# Patient Record
Sex: FEMALE | Race: BLACK OR AFRICAN AMERICAN | Employment: OTHER | URBAN - METROPOLITAN AREA
[De-identification: names, ages, dates, MRNs, and addresses within clinical notes are randomized per-mention and may not be internally consistent; named-entity substitution may affect disease eponyms.]

---

## 2017-04-24 ENCOUNTER — OP HISTORICAL/CONVERTED ENCOUNTER (OUTPATIENT)
Dept: OTHER | Age: 72
End: 2017-04-24

## 2017-05-02 ENCOUNTER — OP HISTORICAL/CONVERTED ENCOUNTER (OUTPATIENT)
Dept: OTHER | Age: 72
End: 2017-05-02

## 2017-08-03 ENCOUNTER — OP HISTORICAL/CONVERTED ENCOUNTER (OUTPATIENT)
Dept: OTHER | Age: 72
End: 2017-08-03

## 2017-11-20 ENCOUNTER — OP HISTORICAL/CONVERTED ENCOUNTER (OUTPATIENT)
Dept: OTHER | Age: 72
End: 2017-11-20

## 2017-12-22 ENCOUNTER — OP HISTORICAL/CONVERTED ENCOUNTER (OUTPATIENT)
Dept: OTHER | Age: 72
End: 2017-12-22

## 2018-02-05 ENCOUNTER — ED HISTORICAL/CONVERTED ENCOUNTER (OUTPATIENT)
Dept: OTHER | Age: 73
End: 2018-02-05

## 2018-02-28 ENCOUNTER — ED HISTORICAL/CONVERTED ENCOUNTER (OUTPATIENT)
Dept: OTHER | Age: 73
End: 2018-02-28

## 2018-03-16 ENCOUNTER — OP HISTORICAL/CONVERTED ENCOUNTER (OUTPATIENT)
Dept: OTHER | Age: 73
End: 2018-03-16

## 2018-06-27 ENCOUNTER — OP HISTORICAL/CONVERTED ENCOUNTER (OUTPATIENT)
Dept: OTHER | Age: 73
End: 2018-06-27

## 2018-08-02 ENCOUNTER — OP HISTORICAL/CONVERTED ENCOUNTER (OUTPATIENT)
Dept: OTHER | Age: 73
End: 2018-08-02

## 2018-09-27 ENCOUNTER — OP HISTORICAL/CONVERTED ENCOUNTER (OUTPATIENT)
Dept: OTHER | Age: 73
End: 2018-09-27

## 2019-03-08 ENCOUNTER — IP HISTORICAL/CONVERTED ENCOUNTER (OUTPATIENT)
Dept: OTHER | Age: 74
End: 2019-03-08

## 2019-07-08 ENCOUNTER — OP HISTORICAL/CONVERTED ENCOUNTER (OUTPATIENT)
Dept: OTHER | Age: 74
End: 2019-07-08

## 2019-09-11 ENCOUNTER — OP HISTORICAL/CONVERTED ENCOUNTER (OUTPATIENT)
Dept: OTHER | Age: 74
End: 2019-09-11

## 2019-09-12 ENCOUNTER — OP HISTORICAL/CONVERTED ENCOUNTER (OUTPATIENT)
Dept: OTHER | Age: 74
End: 2019-09-12

## 2019-12-05 ENCOUNTER — OP HISTORICAL/CONVERTED ENCOUNTER (OUTPATIENT)
Dept: OTHER | Age: 74
End: 2019-12-05

## 2019-12-09 ENCOUNTER — ED HISTORICAL/CONVERTED ENCOUNTER (OUTPATIENT)
Dept: OTHER | Age: 74
End: 2019-12-09

## 2020-04-28 ENCOUNTER — OP HISTORICAL/CONVERTED ENCOUNTER (OUTPATIENT)
Dept: OTHER | Age: 75
End: 2020-04-28

## 2020-05-01 ENCOUNTER — OP HISTORICAL/CONVERTED ENCOUNTER (OUTPATIENT)
Dept: OTHER | Age: 75
End: 2020-05-01

## 2020-06-01 ENCOUNTER — OP HISTORICAL/CONVERTED ENCOUNTER (OUTPATIENT)
Dept: OTHER | Age: 75
End: 2020-06-01

## 2020-07-01 ENCOUNTER — OP HISTORICAL/CONVERTED ENCOUNTER (OUTPATIENT)
Dept: OTHER | Age: 75
End: 2020-07-01

## 2020-07-29 ENCOUNTER — ED HISTORICAL/CONVERTED ENCOUNTER (OUTPATIENT)
Dept: OTHER | Age: 75
End: 2020-07-29

## 2020-08-01 ENCOUNTER — OP HISTORICAL/CONVERTED ENCOUNTER (OUTPATIENT)
Dept: OTHER | Age: 75
End: 2020-08-01

## 2020-09-01 ENCOUNTER — OP HISTORICAL/CONVERTED ENCOUNTER (OUTPATIENT)
Dept: OTHER | Age: 75
End: 2020-09-01

## 2020-09-03 ENCOUNTER — ED HISTORICAL/CONVERTED ENCOUNTER (OUTPATIENT)
Dept: OTHER | Age: 75
End: 2020-09-03

## 2020-09-22 ENCOUNTER — APPOINTMENT (OUTPATIENT)
Dept: GENERAL RADIOLOGY | Age: 75
End: 2020-09-22
Attending: EMERGENCY MEDICINE
Payer: MEDICARE

## 2020-09-22 ENCOUNTER — HOSPITAL ENCOUNTER (EMERGENCY)
Age: 75
Discharge: HOME | End: 2020-09-22
Attending: EMERGENCY MEDICINE
Payer: MEDICARE

## 2020-09-22 VITALS
DIASTOLIC BLOOD PRESSURE: 74 MMHG | TEMPERATURE: 98 F | RESPIRATION RATE: 18 BRPM | OXYGEN SATURATION: 100 % | HEART RATE: 58 BPM | HEIGHT: 62 IN | WEIGHT: 180 LBS | BODY MASS INDEX: 33.13 KG/M2 | SYSTOLIC BLOOD PRESSURE: 190 MMHG

## 2020-09-22 DIAGNOSIS — M54.50 ACUTE LEFT-SIDED LOW BACK PAIN WITHOUT SCIATICA: Primary | ICD-10-CM

## 2020-09-22 LAB
APPEARANCE UR: CLEAR
BACTERIA URNS QL MICRO: NEGATIVE /HPF
BILIRUB UR QL: NEGATIVE
COLOR UR: NORMAL
GLUCOSE UR STRIP.AUTO-MCNC: NEGATIVE MG/DL
HGB UR QL STRIP: NEGATIVE
KETONES UR QL STRIP.AUTO: NEGATIVE MG/DL
LEUKOCYTE ESTERASE UR QL STRIP.AUTO: NEGATIVE
NITRITE UR QL STRIP.AUTO: NEGATIVE
PH UR STRIP: 7 [PH] (ref 5–8)
PROT UR STRIP-MCNC: NEGATIVE MG/DL
RBC #/AREA URNS HPF: NORMAL /HPF (ref 0–5)
SP GR UR REFRACTOMETRY: 1.01 (ref 1–1.03)
UROBILINOGEN UR QL STRIP.AUTO: 0.1 EU/DL (ref 0.1–1)
WBC URNS QL MICRO: NORMAL /HPF (ref 0–4)

## 2020-09-22 PROCEDURE — 72100 X-RAY EXAM L-S SPINE 2/3 VWS: CPT

## 2020-09-22 PROCEDURE — 81003 URINALYSIS AUTO W/O SCOPE: CPT

## 2020-09-22 PROCEDURE — 74011250637 HC RX REV CODE- 250/637: Performed by: EMERGENCY MEDICINE

## 2020-09-22 PROCEDURE — 99283 EMERGENCY DEPT VISIT LOW MDM: CPT

## 2020-09-22 RX ORDER — ACETAMINOPHEN 325 MG/1
650 TABLET ORAL ONCE
Status: COMPLETED | OUTPATIENT
Start: 2020-09-22 | End: 2020-09-22

## 2020-09-22 RX ADMIN — ACETAMINOPHEN 650 MG: 325 TABLET, FILM COATED ORAL at 17:46

## 2020-09-22 NOTE — ED PROVIDER NOTES
EMERGENCY DEPARTMENT HISTORY AND PHYSICAL EXAM        Date: 9/22/2020  Patient Name: Gabi Daly    History of Presenting Illness     Chief Complaint   Patient presents with    Back Pain       History Provided By: Patient    HPI: Gabi Daly, 75 y.o. female with a past medical history significant diabetes, hypertension, asthma and CHF presents to the ED with cc of back pain. Pt states the pain is located in the middle, right back, rates a 10/10 in intensity, and does not radiate. Pt denies any recent falls or injuries. Pt denies accompanying dysuria, weakness in lower extremities, numbness or tingling, and difficulty urinating. PCP: Sowmya Self MD    Current Outpatient Medications   Medication Sig Dispense Refill    VENTOLIN HFA 90 mcg/actuation inhaler   1    nitroglycerin (NITROSTAT) 0.4 mg SL tablet by SubLINGual route every five (5) minutes as needed for Chest Pain.  metolazone (ZAROXOLYN) 5 mg tablet Take 5 mg by mouth every Monday, Wednesday, Friday.  omeprazole (PRILOSEC) 20 mg capsule Take 20 mg by mouth daily.  furosemide (LASIX) 40 mg tablet Take  by mouth daily.  losartan (COZAAR) 50 mg tablet Take  by mouth daily.  carvedilol (COREG) 6.25 mg tablet Take  by mouth two (2) times daily (with meals).  clopidogrel (PLAVIX) 75 mg tablet Take  by mouth daily.  potassium chloride 20 mEq TbER Take  by mouth.  allopurinol (ZYLOPRIM) 100 mg tablet Take  by mouth daily.  amLODIPine (NORVASC) 5 mg tablet Take 5 mg by mouth daily.  aspirin delayed-release 81 mg tablet Take  by mouth daily.  sitaGLIPtin (JANUVIA) 100 mg tablet Take 100 mg by mouth daily.  levothyroxine (SYNTHROID) 50 mcg tablet Take 1 Tab by mouth Daily (before breakfast).  WITH WATER ONLY 30 Tab 6       Past History     Past Medical History:  Past Medical History:   Diagnosis Date    Asthma     [facility]     DM [facility]diabetes mellitus) [facility] [facility]     HTN (hypertension)        Past Surgical History:  Past Surgical History:   Procedure Laterality Date    HX HYSTERECTOMY      HX MOHS PROCEDURES         Family History:  Family History   Problem Relation Age of Onset    Hypertension Mother     Diabetes Mother     Heart Disease Mother        Social History:  Social History     Tobacco Use    Smoking status: Never Smoker    Smokeless tobacco: Never Used   Substance Use Topics    Alcohol use: No    Drug use: Not on file       Allergies:  No Known Allergies    Review of Systems   Review of Systems   Constitutional: Negative for fever. HENT: Negative for congestion. Eyes: Negative for visual disturbance. Respiratory: Negative for cough. Cardiovascular: Negative for chest pain. Gastrointestinal: Negative for vomiting. Genitourinary: Negative for difficulty urinating, dysuria and hematuria. Musculoskeletal: Positive for back pain. Negative for myalgias. Skin: Negative for rash. Neurological: Negative for weakness, numbness and headaches. Physical Exam   Physical Exam  Constitutional:       Appearance: Normal appearance. HENT:      Head: Normocephalic and atraumatic. Pulmonary:      Effort: Pulmonary effort is normal.   Abdominal:      General: There is no distension. Musculoskeletal:      Right lower leg: No edema. Left lower leg: No edema. Comments: No midline spinal tenderness, no perispinal tenderness   Skin:     Findings: No rash. Neurological:      Mental Status: She is alert.       Comments: 5/5 strength in lower extremities, normal sensitivity to light touch in lower extremities   Psychiatric:         Mood and Affect: Mood normal.         Diagnostic Study Results     Labs -     Recent Results (from the past 12 hour(s))   URINALYSIS W/ RFLX MICROSCOPIC    Collection Time: 09/22/20  1:00 PM   Result Value Ref Range    Color Yellow/Straw      Appearance Clear Clear      Specific gravity 1.015 1.003 - 1.030      pH (UA) 7.0 5.0 - 8.0      Protein Negative Negative mg/dL    Glucose Negative Negative mg/dL    Ketone Negative Negative mg/dL    Bilirubin Negative Negative      Blood Negative Negative      Urobilinogen 0.1 0.1 - 1.0 EU/dL    Nitrites Negative Negative      Leukocyte Esterase Negative Negative      WBC 0-5 0 - 4 /hpf    RBC 0-5 0 - 5 /hpf    Bacteria Negative Negative /hpf       Radiologic Studies -   XR SPINE LUMB 2 OR 3 V   Final Result   IMPRESSION:      Spondylosis. CT Results  (Last 48 hours)    None        CXR Results  (Last 48 hours)    None          Medical Decision Making and ED Course     I reviewed the vital signs, available nursing notes, past medical history, past surgical history, family history and social history. Vital Signs - Reviewed the patient's vital signs. Patient Vitals for the past 12 hrs:   Temp Pulse Resp BP SpO2   09/22/20 1252 98 °F (36.7 °C) (!) 58 18 (!) 190/74 100 %     The patient presents with back pain and the differential diagnosis is musculoskeletal back pain, sciatica, arthritis, spinal stenosis    Medical Decision Making:   Presented with low back pain. Ambulates well and has no neurological deficits. No red flag signs for back pain that would warrant MRI. Feel patient can be discharged home. She will take Tylenol at home and does take daily aspirin which may help with her pain as well. She will follow-up with her primary care doctor if her symptoms do not improve for physical therapy or possible MRI. I told her she should follow-up these in 6 weeks if symptoms do not improve. Disposition     Discharged    DISCHARGE PLAN:  1.    Current Discharge Medication List      CONTINUE these medications which have NOT CHANGED    Details   VENTOLIN HFA 90 mcg/actuation inhaler Refills: 1      nitroglycerin (NITROSTAT) 0.4 mg SL tablet by SubLINGual route every five (5) minutes as needed for Chest Pain.      metolazone (ZAROXOLYN) 5 mg tablet Take 5 mg by mouth every Monday, Wednesday, Friday. omeprazole (PRILOSEC) 20 mg capsule Take 20 mg by mouth daily. furosemide (LASIX) 40 mg tablet Take  by mouth daily. losartan (COZAAR) 50 mg tablet Take  by mouth daily. carvedilol (COREG) 6.25 mg tablet Take  by mouth two (2) times daily (with meals). clopidogrel (PLAVIX) 75 mg tablet Take  by mouth daily. potassium chloride 20 mEq TbER Take  by mouth. allopurinol (ZYLOPRIM) 100 mg tablet Take  by mouth daily. amLODIPine (NORVASC) 5 mg tablet Take 5 mg by mouth daily. aspirin delayed-release 81 mg tablet Take  by mouth daily. sitaGLIPtin (JANUVIA) 100 mg tablet Take 100 mg by mouth daily. levothyroxine (SYNTHROID) 50 mcg tablet Take 1 Tab by mouth Daily (before breakfast). WITH WATER ONLY  Qty: 30 Tab, Refills: 6           2. Follow-up Information     Follow up With Specialties Details Why Pooja Otero MD Internal Medicine Schedule an appointment as soon as possible for a visit in 3 days As needed [address] [facility] [address] [phone]      [facility] EMERGENCY DEPT Emergency Medicine Go today As soon as possible if symptoms worsen [address] [facility] [address] [phone]        3. Return to ED if worse     Diagnosis     Clinical impression:   1. Acute left-sided low back pain without sciatica       Attestation:    Cezar YUSUF am scribing for, and in the presence of Dr. Marlena Dumont. I, Dr Marlena Dumont, have reviewed any and all documentation by a scribe and authenticated its accuracy. I have reviewed and agreed with any and all documentation and medical decision making by a nurse practitioner/physician assistant. I have reviewed and agreed with any and all documentation and medical decision making by a nurse practitioner/physician assistant. note that this dictation was completed with [employer], the [employer] voice recognition software.  Quite often unanticipated grammatical, syntax, homophones, and other interpretive errors are inadvertently transcribed by the computer software. Please disregard these errors. Please excuse any errors that have escaped final proofreading. Thank you.   Aylin Don, DO

## 2020-10-01 ENCOUNTER — OP HISTORICAL/CONVERTED ENCOUNTER (OUTPATIENT)
Dept: OTHER | Age: 75
End: 2020-10-01

## 2021-10-26 ENCOUNTER — HOSPITAL ENCOUNTER (OUTPATIENT)
Dept: PREADMISSION TESTING | Age: 76
Discharge: HOME | End: 2021-10-26
Payer: MEDICARE

## 2021-10-26 VITALS
DIASTOLIC BLOOD PRESSURE: 79 MMHG | SYSTOLIC BLOOD PRESSURE: 149 MMHG | TEMPERATURE: 97.6 F | OXYGEN SATURATION: 100 % | BODY MASS INDEX: 30.21 KG/M2 | RESPIRATION RATE: 16 BRPM | WEIGHT: 153.88 LBS | HEIGHT: 60 IN | HEART RATE: 70 BPM

## 2021-10-26 LAB
ALBUMIN SERPL-MCNC: 3.3 G/DL (ref 3.5–5)
ALBUMIN/GLOB SERPL: 0.8 {RATIO} (ref 1.1–2.2)
ALP SERPL-CCNC: 55 U/L (ref 45–117)
ALT SERPL-CCNC: 16 U/L (ref 12–78)
ANION GAP SERPL CALC-SCNC: 4 MMOL/L (ref 5–15)
APPEARANCE UR: CLEAR
APTT PPP: 33.8 SEC (ref 21.2–34.1)
AST SERPL W P-5'-P-CCNC: 11 U/L (ref 15–37)
BACTERIA URNS QL MICRO: NEGATIVE /HPF
BILIRUB SERPL-MCNC: 0.2 MG/DL (ref 0.2–1)
BILIRUB UR QL: NEGATIVE
BUN SERPL-MCNC: 20 MG/DL (ref 6–20)
BUN/CREAT SERPL: 18 (ref 12–20)
CA-I BLD-MCNC: 9.7 MG/DL (ref 8.5–10.1)
CHLORIDE SERPL-SCNC: 105 MMOL/L (ref 97–108)
CO2 SERPL-SCNC: 32 MMOL/L (ref 21–32)
COLOR UR: NORMAL
CREAT SERPL-MCNC: 1.14 MG/DL (ref 0.55–1.02)
ERYTHROCYTE [DISTWIDTH] IN BLOOD BY AUTOMATED COUNT: 12.8 % (ref 11.5–14.5)
GLOBULIN SER CALC-MCNC: 4 G/DL (ref 2–4)
GLUCOSE SERPL-MCNC: 108 MG/DL (ref 65–100)
GLUCOSE UR STRIP.AUTO-MCNC: NEGATIVE MG/DL
HCT VFR BLD AUTO: 40.3 % (ref 35–47)
HGB BLD-MCNC: 12.9 G/DL (ref 11.5–16)
HGB UR QL STRIP: NEGATIVE
INR PPP: 1 (ref 0.9–1.1)
KETONES UR QL STRIP.AUTO: NEGATIVE MG/DL
LEUKOCYTE ESTERASE UR QL STRIP.AUTO: NEGATIVE
MCH RBC QN AUTO: 31.1 PG (ref 26–34)
MCHC RBC AUTO-ENTMCNC: 32 G/DL (ref 30–36.5)
MCV RBC AUTO: 97.1 FL (ref 80–99)
MUCOUS THREADS URNS QL MICRO: NORMAL /LPF
NITRITE UR QL STRIP.AUTO: NEGATIVE
NRBC # BLD: 0 K/UL (ref 0–0.01)
NRBC BLD-RTO: 0 PER 100 WBC
PH UR STRIP: 7 [PH] (ref 5–8)
PLATELET # BLD AUTO: 222 K/UL (ref 150–400)
PMV BLD AUTO: 9.6 FL (ref 8.9–12.9)
POTASSIUM SERPL-SCNC: 4.1 MMOL/L (ref 3.5–5.1)
PROT SERPL-MCNC: 7.3 G/DL (ref 6.4–8.2)
PROT UR STRIP-MCNC: NEGATIVE MG/DL
PROTHROMBIN TIME: 12.8 SEC (ref 11.9–14.7)
RBC # BLD AUTO: 4.15 M/UL (ref 3.8–5.2)
RBC #/AREA URNS HPF: NORMAL /HPF (ref 0–5)
SODIUM SERPL-SCNC: 141 MMOL/L (ref 136–145)
SP GR UR REFRACTOMETRY: 1.01 (ref 1–1.03)
THERAPEUTIC RANGE,PTTT: NORMAL SEC (ref 82–109)
UROBILINOGEN UR QL STRIP.AUTO: 0.1 EU/DL (ref 0.1–1)
WBC # BLD AUTO: 4.9 K/UL (ref 3.6–11)
WBC URNS QL MICRO: NORMAL /HPF (ref 0–4)

## 2021-10-26 PROCEDURE — 81001 URINALYSIS AUTO W/SCOPE: CPT

## 2021-10-26 PROCEDURE — 36415 COLL VENOUS BLD VENIPUNCTURE: CPT

## 2021-10-26 PROCEDURE — 85730 THROMBOPLASTIN TIME PARTIAL: CPT

## 2021-10-26 PROCEDURE — 80053 COMPREHEN METABOLIC PANEL: CPT

## 2021-10-26 PROCEDURE — 85027 COMPLETE CBC AUTOMATED: CPT

## 2021-10-26 PROCEDURE — 85610 PROTHROMBIN TIME: CPT

## 2021-10-26 RX ORDER — TRAMADOL HYDROCHLORIDE AND ACETAMINOPHEN 37.5; 325 MG/1; MG/1
1 TABLET ORAL
COMMUNITY

## 2021-10-26 RX ORDER — HYDRALAZINE HYDROCHLORIDE 25 MG/1
25 TABLET, FILM COATED ORAL 3 TIMES DAILY
COMMUNITY

## 2021-10-26 RX ORDER — MECLIZINE HYDROCHLORIDE 25 MG/1
25 TABLET ORAL
COMMUNITY
End: 2022-08-16

## 2021-10-26 RX ORDER — LISINOPRIL 5 MG/1
5 TABLET ORAL DAILY
COMMUNITY
End: 2022-08-16

## 2021-10-26 RX ORDER — DULOXETIN HYDROCHLORIDE 30 MG/1
30 CAPSULE, DELAYED RELEASE ORAL DAILY
COMMUNITY

## 2021-10-27 ENCOUNTER — HOSPITAL ENCOUNTER (OUTPATIENT)
Age: 76
Discharge: HOME | End: 2021-10-27
Attending: INTERNAL MEDICINE | Admitting: INTERNAL MEDICINE
Payer: MEDICARE

## 2021-10-27 VITALS
SYSTOLIC BLOOD PRESSURE: 119 MMHG | RESPIRATION RATE: 16 BRPM | HEART RATE: 73 BPM | DIASTOLIC BLOOD PRESSURE: 57 MMHG | OXYGEN SATURATION: 100 % | TEMPERATURE: 97.8 F

## 2021-10-27 DIAGNOSIS — R07.9 CHEST PAIN, UNSPECIFIED TYPE: ICD-10-CM

## 2021-10-27 LAB
GLUCOSE BLD STRIP.AUTO-MCNC: 79 MG/DL (ref 65–117)
PERFORMED BY, TECHID: NORMAL

## 2021-10-27 PROCEDURE — 77030016700 HC CATH ANGI DX INFN2 CARD -B: Performed by: INTERNAL MEDICINE

## 2021-10-27 PROCEDURE — 93454 CORONARY ARTERY ANGIO S&I: CPT | Performed by: INTERNAL MEDICINE

## 2021-10-27 PROCEDURE — 76210000026 HC REC RM PH II 1 TO 1.5 HR: Performed by: INTERNAL MEDICINE

## 2021-10-27 PROCEDURE — 77030019569 HC BND COMPR RAD TERU -B: Performed by: INTERNAL MEDICINE

## 2021-10-27 PROCEDURE — C1894 INTRO/SHEATH, NON-LASER: HCPCS | Performed by: INTERNAL MEDICINE

## 2021-10-27 PROCEDURE — 77030019698 HC SYR ANGI MDLON MRTM -A: Performed by: INTERNAL MEDICINE

## 2021-10-27 PROCEDURE — 99152 MOD SED SAME PHYS/QHP 5/>YRS: CPT | Performed by: INTERNAL MEDICINE

## 2021-10-27 PROCEDURE — 76210000017 HC OR PH I REC 1.5 TO 2 HR: Performed by: INTERNAL MEDICINE

## 2021-10-27 PROCEDURE — C1769 GUIDE WIRE: HCPCS | Performed by: INTERNAL MEDICINE

## 2021-10-27 PROCEDURE — 74011250636 HC RX REV CODE- 250/636: Performed by: INTERNAL MEDICINE

## 2021-10-27 PROCEDURE — 74011000250 HC RX REV CODE- 250: Performed by: INTERNAL MEDICINE

## 2021-10-27 PROCEDURE — 82962 GLUCOSE BLOOD TEST: CPT

## 2021-10-27 RX ORDER — LIDOCAINE HYDROCHLORIDE 10 MG/ML
INJECTION INFILTRATION; PERINEURAL AS NEEDED
Status: DISCONTINUED | OUTPATIENT
Start: 2021-10-27 | End: 2021-10-27 | Stop reason: HOSPADM

## 2021-10-27 RX ORDER — MIDAZOLAM HYDROCHLORIDE 1 MG/ML
INJECTION INTRAMUSCULAR; INTRAVENOUS AS NEEDED
Status: DISCONTINUED | OUTPATIENT
Start: 2021-10-27 | End: 2021-10-27 | Stop reason: HOSPADM

## 2021-10-27 RX ORDER — HEPARIN SODIUM 1000 [USP'U]/ML
INJECTION, SOLUTION INTRAVENOUS; SUBCUTANEOUS AS NEEDED
Status: DISCONTINUED | OUTPATIENT
Start: 2021-10-27 | End: 2021-10-27 | Stop reason: HOSPADM

## 2021-10-27 RX ORDER — MORPHINE SULFATE 2 MG/ML
INJECTION, SOLUTION INTRAMUSCULAR; INTRAVENOUS AS NEEDED
Status: DISCONTINUED | OUTPATIENT
Start: 2021-10-27 | End: 2021-10-27 | Stop reason: HOSPADM

## 2021-10-27 RX ORDER — SODIUM CHLORIDE 0.9 % (FLUSH) 0.9 %
5-40 SYRINGE (ML) INJECTION EVERY 8 HOURS
Status: DISCONTINUED | OUTPATIENT
Start: 2021-10-27 | End: 2021-10-27 | Stop reason: HOSPADM

## 2021-10-27 RX ORDER — NITROGLYCERIN 5 MG/ML
INJECTION, SOLUTION INTRAVENOUS AS NEEDED
Status: DISCONTINUED | OUTPATIENT
Start: 2021-10-27 | End: 2021-10-27 | Stop reason: HOSPADM

## 2021-10-27 RX ORDER — SODIUM CHLORIDE 0.9 % (FLUSH) 0.9 %
5-40 SYRINGE (ML) INJECTION AS NEEDED
Status: DISCONTINUED | OUTPATIENT
Start: 2021-10-27 | End: 2021-10-27 | Stop reason: HOSPADM

## 2021-10-27 RX ORDER — SODIUM CHLORIDE 9 MG/ML
50 INJECTION, SOLUTION INTRAVENOUS CONTINUOUS
Status: DISCONTINUED | OUTPATIENT
Start: 2021-10-27 | End: 2021-10-27 | Stop reason: HOSPADM

## 2021-10-27 RX ADMIN — SODIUM CHLORIDE 50 ML/HR: 9 INJECTION, SOLUTION INTRAVENOUS at 06:42

## 2022-03-18 PROBLEM — R07.9 CHEST PAIN: Status: ACTIVE | Noted: 2021-10-27

## 2022-04-15 ENCOUNTER — HOSPITAL ENCOUNTER (OUTPATIENT)
Dept: PREADMISSION TESTING | Age: 77
Discharge: HOME | End: 2022-04-15

## 2022-04-19 ENCOUNTER — HOSPITAL ENCOUNTER (OUTPATIENT)
Dept: NON INVASIVE DIAGNOSTICS | Age: 77
Discharge: HOME | End: 2022-04-19
Attending: INTERNAL MEDICINE

## 2022-08-12 ENCOUNTER — APPOINTMENT (OUTPATIENT)
Dept: CT IMAGING | Age: 77
End: 2022-08-12
Attending: EMERGENCY MEDICINE
Payer: MEDICARE

## 2022-08-12 ENCOUNTER — APPOINTMENT (OUTPATIENT)
Dept: GENERAL RADIOLOGY | Age: 77
End: 2022-08-12
Attending: EMERGENCY MEDICINE
Payer: MEDICARE

## 2022-08-12 ENCOUNTER — HOSPITAL ENCOUNTER (OUTPATIENT)
Age: 77
Setting detail: OBSERVATION
Discharge: HOME HEALTH | End: 2022-08-16
Attending: EMERGENCY MEDICINE | Admitting: HOSPITALIST
Payer: MEDICARE

## 2022-08-12 DIAGNOSIS — I50.9 CONGESTIVE HEART FAILURE, UNSPECIFIED HF CHRONICITY, UNSPECIFIED HEART FAILURE TYPE (HCC): ICD-10-CM

## 2022-08-12 DIAGNOSIS — R06.02 SOB (SHORTNESS OF BREATH): Primary | ICD-10-CM

## 2022-08-12 DIAGNOSIS — I16.0 HYPERTENSIVE URGENCY: ICD-10-CM

## 2022-08-12 DIAGNOSIS — I21.4 NSTEMI (NON-ST ELEVATED MYOCARDIAL INFARCTION) (HCC): ICD-10-CM

## 2022-08-12 DIAGNOSIS — R09.02 HYPOXIA: ICD-10-CM

## 2022-08-12 LAB
BASOPHILS # BLD: 0 K/UL (ref 0–0.1)
BASOPHILS NFR BLD: 0 % (ref 0–1)
BNP SERPL-MCNC: ABNORMAL PG/ML
DIFFERENTIAL METHOD BLD: ABNORMAL
EOSINOPHIL # BLD: 0.1 K/UL (ref 0–0.4)
EOSINOPHIL NFR BLD: 1 % (ref 0–7)
ERYTHROCYTE [DISTWIDTH] IN BLOOD BY AUTOMATED COUNT: 14.4 % (ref 11.5–14.5)
HCT VFR BLD AUTO: 39.6 % (ref 35–47)
HGB BLD-MCNC: 12.4 G/DL (ref 11.5–16)
IMM GRANULOCYTES # BLD AUTO: 0 K/UL (ref 0–0.04)
IMM GRANULOCYTES NFR BLD AUTO: 0 % (ref 0–0.5)
LYMPHOCYTES # BLD: 0.9 K/UL (ref 0.8–3.5)
LYMPHOCYTES NFR BLD: 12 % (ref 12–49)
MCH RBC QN AUTO: 30.2 PG (ref 26–34)
MCHC RBC AUTO-ENTMCNC: 31.3 G/DL (ref 30–36.5)
MCV RBC AUTO: 96.4 FL (ref 80–99)
MONOCYTES # BLD: 0.6 K/UL (ref 0–1)
MONOCYTES NFR BLD: 8 % (ref 5–13)
NEUTS SEG # BLD: 5.8 K/UL (ref 1.8–8)
NEUTS SEG NFR BLD: 79 % (ref 32–75)
PLATELET # BLD AUTO: 179 K/UL (ref 150–400)
PMV BLD AUTO: 10.9 FL (ref 8.9–12.9)
RBC # BLD AUTO: 4.11 M/UL (ref 3.8–5.2)
TROPONIN-HIGH SENSITIVITY: 136 NG/L (ref 0–51)
TROPONIN-HIGH SENSITIVITY: 87 NG/L (ref 0–51)
WBC # BLD AUTO: 7.4 K/UL (ref 3.6–11)

## 2022-08-12 PROCEDURE — 96374 THER/PROPH/DIAG INJ IV PUSH: CPT

## 2022-08-12 PROCEDURE — 36415 COLL VENOUS BLD VENIPUNCTURE: CPT

## 2022-08-12 PROCEDURE — 84484 ASSAY OF TROPONIN QUANT: CPT

## 2022-08-12 PROCEDURE — G0378 HOSPITAL OBSERVATION PER HR: HCPCS

## 2022-08-12 PROCEDURE — 74011250637 HC RX REV CODE- 250/637: Performed by: EMERGENCY MEDICINE

## 2022-08-12 PROCEDURE — 99285 EMERGENCY DEPT VISIT HI MDM: CPT

## 2022-08-12 PROCEDURE — 80048 BASIC METABOLIC PNL TOTAL CA: CPT

## 2022-08-12 PROCEDURE — 74011250636 HC RX REV CODE- 250/636: Performed by: EMERGENCY MEDICINE

## 2022-08-12 PROCEDURE — 80076 HEPATIC FUNCTION PANEL: CPT

## 2022-08-12 PROCEDURE — 70450 CT HEAD/BRAIN W/O DYE: CPT

## 2022-08-12 PROCEDURE — 71045 X-RAY EXAM CHEST 1 VIEW: CPT

## 2022-08-12 PROCEDURE — 83880 ASSAY OF NATRIURETIC PEPTIDE: CPT

## 2022-08-12 PROCEDURE — 85025 COMPLETE CBC W/AUTO DIFF WBC: CPT

## 2022-08-12 PROCEDURE — 93005 ELECTROCARDIOGRAM TRACING: CPT

## 2022-08-12 PROCEDURE — 84443 ASSAY THYROID STIM HORMONE: CPT

## 2022-08-12 RX ORDER — FUROSEMIDE 10 MG/ML
60 INJECTION INTRAMUSCULAR; INTRAVENOUS ONCE
Status: COMPLETED | OUTPATIENT
Start: 2022-08-12 | End: 2022-08-12

## 2022-08-12 RX ORDER — CLONIDINE HYDROCHLORIDE 0.1 MG/1
0.2 TABLET ORAL
Status: COMPLETED | OUTPATIENT
Start: 2022-08-12 | End: 2022-08-12

## 2022-08-12 RX ORDER — ASPIRIN 325 MG
325 TABLET ORAL ONCE
Status: COMPLETED | OUTPATIENT
Start: 2022-08-12 | End: 2022-08-12

## 2022-08-12 RX ADMIN — CLONIDINE HYDROCHLORIDE 0.2 MG: 0.1 TABLET ORAL at 21:57

## 2022-08-12 RX ADMIN — ASPIRIN 325 MG ORAL TABLET 325 MG: 325 PILL ORAL at 21:51

## 2022-08-12 RX ADMIN — FUROSEMIDE 60 MG: 10 INJECTION, SOLUTION INTRAMUSCULAR; INTRAVENOUS at 21:41

## 2022-08-12 RX ADMIN — NITROGLYCERIN 1 INCH: 20 OINTMENT TOPICAL at 23:54

## 2022-08-12 NOTE — Clinical Note
Status[de-identified] INPATIENT [101]   Type of Bed: Telemetry [19]   Cardiac Monitoring Required?: Yes   Inpatient Hospitalization Certified Necessary for the Following Reasons: 3.  Patient receiving treatment that can only be provided in an inpatient setting (further clarification in H&P documentation)   Admitting Diagnosis: CHF (congestive heart failure) [facility]) [116874]   Admitting Diagnosis: Hypoxia [185051]   Admitting Physician: Lionel MONGE [facility] [[id number]]   Attending Physician: Yuliya Bellamy [[id number]]   Estimated Length of Stay: 2 Midnights   Discharge Plan[de-identified] Home with Office Follow-up

## 2022-08-13 LAB
ALBUMIN SERPL-MCNC: 3.4 G/DL (ref 3.5–5)
ALBUMIN/GLOB SERPL: 1 {RATIO} (ref 1.1–2.2)
ALP SERPL-CCNC: 49 U/L (ref 45–117)
ALT SERPL-CCNC: 53 U/L (ref 12–78)
ANION GAP SERPL CALC-SCNC: 9 MMOL/L (ref 5–15)
AST SERPL W P-5'-P-CCNC: 46 U/L (ref 15–37)
ATRIAL RATE: 127 BPM
BILIRUB DIRECT SERPL-MCNC: 0.5 MG/DL (ref 0–0.2)
BILIRUB SERPL-MCNC: 1.4 MG/DL (ref 0.2–1)
BUN SERPL-MCNC: 18 MG/DL (ref 6–20)
BUN/CREAT SERPL: 16 (ref 12–20)
CA-I BLD-MCNC: 9.2 MG/DL (ref 8.5–10.1)
CALCULATED P AXIS, ECG09: 78 DEGREES
CALCULATED R AXIS, ECG10: 4 DEGREES
CALCULATED T AXIS, ECG11: 7 DEGREES
CHLORIDE SERPL-SCNC: 107 MMOL/L (ref 97–108)
CO2 SERPL-SCNC: 24 MMOL/L (ref 21–32)
CREAT SERPL-MCNC: 1.15 MG/DL (ref 0.55–1.02)
DIAGNOSIS, 93000: NORMAL
GLOBULIN SER CALC-MCNC: 3.5 G/DL (ref 2–4)
GLUCOSE SERPL-MCNC: 150 MG/DL (ref 65–100)
P-R INTERVAL, ECG05: 216 MS
POTASSIUM SERPL-SCNC: 3.7 MMOL/L (ref 3.5–5.1)
PROT SERPL-MCNC: 6.9 G/DL (ref 6.4–8.2)
Q-T INTERVAL, ECG07: 326 MS
QRS DURATION, ECG06: 80 MS
QTC CALCULATION (BEZET), ECG08: 470 MS
SODIUM SERPL-SCNC: 140 MMOL/L (ref 136–145)
TROPONIN-HIGH SENSITIVITY: 180 NG/L (ref 0–51)
TSH SERPL DL<=0.05 MIU/L-ACNC: 9.58 UIU/ML (ref 0.36–3.74)
VENTRICULAR RATE, ECG03: 125 BPM

## 2022-08-13 PROCEDURE — 94761 N-INVAS EAR/PLS OXIMETRY MLT: CPT

## 2022-08-13 PROCEDURE — G0378 HOSPITAL OBSERVATION PER HR: HCPCS

## 2022-08-13 PROCEDURE — 36415 COLL VENOUS BLD VENIPUNCTURE: CPT

## 2022-08-13 PROCEDURE — 77010033678 HC OXYGEN DAILY

## 2022-08-13 PROCEDURE — 74011250637 HC RX REV CODE- 250/637: Performed by: INTERNAL MEDICINE

## 2022-08-13 PROCEDURE — 65270000029 HC RM PRIVATE

## 2022-08-13 PROCEDURE — 84484 ASSAY OF TROPONIN QUANT: CPT

## 2022-08-13 PROCEDURE — 94762 N-INVAS EAR/PLS OXIMTRY CONT: CPT

## 2022-08-13 RX ORDER — CARVEDILOL 12.5 MG/1
12.5 TABLET ORAL 2 TIMES DAILY WITH MEALS
Status: DISCONTINUED | OUTPATIENT
Start: 2022-08-13 | End: 2022-08-16 | Stop reason: HOSPADM

## 2022-08-13 RX ORDER — SPIRONOLACTONE 25 MG/1
12.5 TABLET ORAL DAILY
Status: DISCONTINUED | OUTPATIENT
Start: 2022-08-14 | End: 2022-08-16 | Stop reason: HOSPADM

## 2022-08-13 RX ORDER — ATORVASTATIN CALCIUM 40 MG/1
40 TABLET, FILM COATED ORAL
Status: DISCONTINUED | OUTPATIENT
Start: 2022-08-13 | End: 2022-08-16 | Stop reason: HOSPADM

## 2022-08-13 RX ORDER — GUAIFENESIN 100 MG/5ML
81 LIQUID (ML) ORAL DAILY
Status: DISCONTINUED | OUTPATIENT
Start: 2022-08-14 | End: 2022-08-16 | Stop reason: HOSPADM

## 2022-08-13 RX ORDER — LOSARTAN POTASSIUM 50 MG/1
50 TABLET ORAL DAILY
Status: DISCONTINUED | OUTPATIENT
Start: 2022-08-14 | End: 2022-08-16 | Stop reason: HOSPADM

## 2022-08-13 RX ADMIN — ATORVASTATIN CALCIUM 40 MG: 40 TABLET, FILM COATED ORAL at 21:10

## 2022-08-13 RX ADMIN — CARVEDILOL 12.5 MG: 12.5 TABLET, FILM COATED ORAL at 17:25

## 2022-08-13 NOTE — ED NOTES
While rounding with second RN for offgoing shift report, patient noted to be off monitors. Patient's purewick, IV, and nasal cannula self removed. 2 RNs assisted with reorienting patient, changing linens, and reapplying monitors. Patient noted to have urinated in drawer, confused, unaware that she was in the hospital or why. This RN discussed circumstances with patient and plan of care.     Shift report given to CURTIS Ayala RN. Patient resting in a position of comfort with door open, room close to nurses station.

## 2022-08-13 NOTE — CONSULTS
Consult    NAME: Larisa Walter   :  1945   MRN:  [id number]     Date/Time:  2022 3:55 PM    Patient PCP: Edgard Villanueva MD  ________________________________________________________________________      Subjective:   CHIEF COMPLAINT: Shortness of breath and chest pain. HISTORY OF PRESENT ILLNESS:     Covering for Dr. Ceballos Doctor  Patient stated that she experienced some shortness of breath and chest pain and pain that lasted about 4 minutes and was in left parasternal border area. She also experience some palpitation and she presented to emergency room. Her BNP is over 10,000. Episode happened while she was walking to the mailbox. She denied any dizziness or lightheadedness or loss of consciousness. According to daughter patient is more short of breath and has some leg edema for couple weeks. She does give history of heart failure. Past Medical History:   Diagnosis Date    Asthma     Chest discomfort     pt states off & on for few months with or without activity    CHF (congestive heart failure) (HCC)     DM (diabetes mellitus) ([address]     GERD (gastroesophageal reflux disease)     HTN (hypertension)     Thyroid disease       Past Surgical History:   Procedure Laterality Date    HX HYSTERECTOMY      HX MOHS PROCEDURES       No Known Allergies   Meds:  See below  Social History     Tobacco Use    Smoking status: Never    Smokeless tobacco: Never   Substance Use Topics    Alcohol use: No   Negative for smoking drinking or drugs. Family History   Problem Relation Age of Onset    Hypertension Mother     Diabetes Mother     Heart Disease Mother         FAMILY HISTORY: Mother  in her [de-identified] and she had a heart condition and diabetes and also had a pacemaker. She does not know anything about her father. PERSONAL HISTORY : Patient is  and has 7 children and she used to work at [employer]. She is retired.     REVIEW OF SYSTEMS:     []         Unable to obtain ROS due to ---   [x]         Total of 12 systems reviewed as follows:    Constitutional: negative fever, negative chills, negative weight loss  Eyes:   negative visual changes  ENT:   negative sore throat, tongue or lip swelling  Respiratory:  negative cough, significant for dyspnea  Cards:              significant for chest pain, palpitations, lower extremity edema  GI:   negative for nausea, vomiting, diarrhea, and abdominal pain  Genitourinary: negative for frequency, dysuria  Integument:  negative for rash   Hematologic:  negative for easy bruising and gum/nose bleeding  Musculoskel: negative for myalgias,  back pain  Neurological:  negative for headaches, dizziness, vertigo, weakness  Behavl/Psych: negative for feelings of anxiety, depression     Pertinent Positives include :    Objective:      Physical Exam:    Last 24hrs VS reviewed since prior progress note. Most recent are:    Visit Vitals  BP (!) 151/85 (BP 1 Location: Right upper arm, BP Patient Position: At rest)   Pulse 76   Temp 97.7 °F (36.5 °C)   Resp 20   Ht 5' 2\" (1.575 m)   Wt 68 kg (150 lb)   SpO2 92%   Breastfeeding No   BMI 27.44 kg/m²       Intake/Output Summary (Last 24 hours) at 8/13/2022 1555  Last data filed at 8/13/2022 0810  Gross per 24 hour   Intake --   Output 3300 ml   Net -3300 ml        General Appearance: Well developed, well nourished, alert & oriented x 3,    no acute distress. Ears/Nose/Mouth/Throat: Pupils equal and round, Hearing grossly normal.  Neck: Supple. JVP within normal limits. Carotids good upstrokes, with no bruit. Chest: Lungs clear to auscultation bilaterally. Cardiovascular: Regular rate and rhythm, S1S2 normal, no murmur, rubs, gallops. Abdomen: Soft, non-tender, bowel sounds are active. No organomegaly. Extremities: No edema bilaterally. Femoral pulses +2, Distal Pulses +1. Skin: Warm and dry. Neuro: CN II-XII grossly intact, Strength and sensation grossly intact.     []         Post-cath site without hematoma, bruit, tenderness, or thrill. Distal pulses intact. Data:      Telemetry: Sinus rhythm and she had episode of atrial tachycardia/short run of atrial fibrillation    EKG:  []  No new EKG for review. Prior to Admission medications    Medication Sig Start Date End Date Taking? Authorizing Provider   traMADol-acetaminophen (ULTRACET) 37.5-325 mg per tablet Take 1 Tablet by mouth every six (6) hours as needed for Pain. Provider, Historical   hydrALAZINE (APRESOLINE) 25 mg tablet Take 25 mg by mouth three (3) times daily. Provider, Historical   lisinopriL (PRINIVIL, ZESTRIL) 5 mg tablet Take 5 mg by mouth daily. Provider, Historical   MAGNESIUM OXIDE, BULK, 200 mg by Does Not Apply route daily. Provider, Historical   meclizine (ANTIVERT) 25 mg tablet Take 25 mg by mouth two (2) times daily as needed for Dizziness. Patient not taking: Reported on 10/27/2021    Provider, Historical   DULoxetine (CYMBALTA) 30 mg capsule Take 30 mg by mouth daily. Provider, Historical   VENTOLIN HFA 90 mcg/actuation inhaler Take 2 Puffs by inhalation as needed. 2/24/16   Provider, Historical   nitroglycerin (NITROSTAT) 0.4 mg SL tablet by SubLINGual route every five (5) minutes as needed for Chest Pain. Provider, Historical   metolazone (ZAROXOLYN) 5 mg tablet Take 5 mg by mouth every Monday, Wednesday, Friday. Patient not taking: Reported on 10/26/2021    Provider, Historical   omeprazole (PRILOSEC) 20 mg capsule Take 20 mg by mouth daily. Patient not taking: Reported on 10/26/2021    Provider, Historical   furosemide (LASIX) 40 mg tablet Take 40 mg by mouth as needed. Every 6-8 hours as needed per Dr. Williams Tubbs office note    Provider, Historical   losartan (COZAAR) 50 mg tablet Take 50 mg by mouth daily. Provider, Historical   carvedilol (COREG) 6.25 mg tablet Take 12.5 mg by mouth two (2) times daily (with meals).     Provider, Historical   clopidogrel (PLAVIX) 75 mg tablet Take 75 mg by mouth daily.    Provider, Historical   potassium chloride 20 mEq TbER Take  by mouth. Patient not taking: Reported on 10/26/2021    Provider, Historical   allopurinol (ZYLOPRIM) 100 mg tablet Take  by mouth daily. Patient not taking: Reported on 10/26/2021    Provider, Historical   amLODIPine (NORVASC) 5 mg tablet Take 5 mg by mouth daily. Provider, Historical   aspirin delayed-release 81 mg tablet Take 81 mg by mouth daily. Provider, Historical   sitaGLIPtin (JANUVIA) 100 mg tablet Take 50 mg by mouth daily. Provider, Historical   levothyroxine (SYNTHROID) 50 mcg tablet Take 1 Tab by mouth Daily (before breakfast). WITH WATER ONLY  Patient taking differently: Take 25 mcg by mouth Daily (before breakfast). WITH WATER ONLY 4/21/16   Anayeli Boyd MD       Recent Results (from the past 24 hour(s))   EKG, 12 LEAD, INITIAL    Collection Time: 08/12/22  8:55 PM   Result Value Ref Range    Ventricular Rate 125 BPM    Atrial Rate 127 BPM    P-R Interval 216 ms    QRS Duration 80 ms    Q-T Interval 326 ms    QTC Calculation (Bezet) 470 ms    Calculated P Axis 78 degrees    Calculated R Axis 4 degrees    Calculated T Axis 7 degrees    Diagnosis       Sinus Tachycardia/short burst of SVT with 1st degree A-V block with Premature   atrial complexes  Septal infarct  Abnormal ECG    Confirmed by Lo Garcia MD, [facility] ([id number]) on 8/13/2022 9:13:23 AM     CBC WITH AUTOMATED DIFF    Collection Time: 08/12/22  9:00 PM   Result Value Ref Range    WBC 7.4 3.6 - 11.0 K/uL    RBC 4.11 3.80 - 5.20 M/uL    HGB 12.4 11.5 - 16.0 g/dL    HCT 39.6 35.0 - 47.0 %    MCV 96.4 80.0 - 99.0 FL    MCH 30.2 26.0 - 34.0 PG    MCHC 31.3 30.0 - 36.5 g/dL    RDW 14.4 11.5 - 14.5 %    PLATELET 030 017 - 899 K/uL    MPV 10.9 8.9 - 12.9 FL    NEUTROPHILS 79 (H) 32 - 75 %    LYMPHOCYTES 12 12 - 49 %    MONOCYTES 8 5 - 13 %    EOSINOPHILS 1 0 - 7 %    BASOPHILS 0 0 - 1 %    IMMATURE GRANULOCYTES 0 0.0 - 0.5 %    ABS. NEUTROPHILS 5.8 1.8 - 8.0 K/UL    ABS. LYMPHOCYTES 0.9 0.8 - 3.5 K/UL    ABS. MONOCYTES 0.6 0.0 - 1.0 K/UL    ABS. EOSINOPHILS 0.1 0.0 - 0.4 K/UL    ABS. BASOPHILS 0.0 0.0 - 0.1 K/UL    ABS. IMM. GRANS. 0.0 0.00 - 0.04 K/UL    DF AUTOMATED     TROPONIN-HIGH SENSITIVITY    Collection Time: 08/12/22  9:00 PM   Result Value Ref Range    Troponin-High Sensitivity 87 (H) 0 - 51 ng/L   METABOLIC PANEL, BASIC    Collection Time: 08/12/22  9:00 PM   Result Value Ref Range    Sodium 140 136 - 145 mmol/L    Potassium 3.7 3.5 - 5.1 mmol/L    Chloride 107 97 - 108 mmol/L    CO2 24 21 - 32 mmol/L    Anion gap 9 5 - 15 mmol/L    Glucose 150 (H) 65 - 100 mg/dL    BUN 18 6 - 20 mg/dL    Creatinine 1.15 (H) 0.55 - 1.02 mg/dL    BUN/Creatinine ratio 16 12 - 20      GFR est AA 55 (L) >60 ml/min/1.73m2    GFR est non-AA 46 (L) >60 ml/min/1.73m2    Calcium 9.2 8.5 - 10.1 mg/dL   HEPATIC FUNCTION PANEL    Collection Time: 08/12/22  9:00 PM   Result Value Ref Range    Protein, total 6.9 6.4 - 8.2 g/dL    Albumin 3.4 (L) 3.5 - 5.0 g/dL    Globulin 3.5 2.0 - 4.0 g/dL    A-G Ratio 1.0 (L) 1.1 - 2.2      Bilirubin, total 1.4 (H) 0.2 - 1.0 mg/dL    Bilirubin, direct 0.5 (H) 0.0 - 0.2 mg/dL    Alk. phosphatase 49 45 - 117 U/L    AST (SGOT) 46 (H) 15 - 37 U/L    ALT (SGPT) 53 12 - 78 U/L   TSH 3RD GENERATION    Collection Time: 08/12/22  9:00 PM   Result Value Ref Range    TSH 9.58 (H) 0.36 - 3.74 uIU/mL   NT-PRO BNP    Collection Time: 08/12/22 10:45 PM   Result Value Ref Range    NT pro-BNP 10,221 (H) <450 pg/mL   TROPONIN-HIGH SENSITIVITY    Collection Time: 08/12/22 10:59 PM   Result Value Ref Range    Troponin-High Sensitivity 136 (HH) 0 - 51 ng/L   TROPONIN-HIGH SENSITIVITY    Collection Time: 08/13/22  5:30 AM   Result Value Ref Range    Troponin-High Sensitivity 180 (HH) 0 - 51 ng/L         Assessment:   Her presentation is concerning for heart failure. May have paroxysmal atrial fibrillation and atrial tachycardia. Hypertension. Diabetes mellitus type 2.   TSH level is high.  Her troponin I is 180 and I seriously doubt that this is a presentation of myocardial infarction at this value is probably indeterminate. Plan:   I will adjust the blood pressure medications and monitor for cardiac arrhythmias. Probably will check echocardiogram and consider diuretics. If she has episodes of paroxysmal atrial fibrillation may consider anticoagulation. Other management as per medical doctor. Thank you.         []        High complexity decision making was performed    Onesimo Reardon MD

## 2022-08-13 NOTE — ED PROVIDER NOTES
EMERGENCY DEPARTMENT HISTORY AND PHYSICAL EXAM      Date: 8/12/2022  Patient Name: Marisela Noguera    History of Presenting Illness       History Provided By: Patient    HPI: Marisela Noguera, 67 y.o. female presents to the ED with cc of shortness of breath. Patient complains of sudden onset of shortness of breath and lightheadedness while she was walking to a mailbox just prior to arrival.  Patient has a history of congestive heart failure and has been compliant with her cardiac medications. No chest pain. No fever chills. No URI symptoms. No headache, visual changes, slurred speech, paresthesia, or motor dysfunction. No vertiginous dizziness. Past History     Past Medical History:  Past Medical History:   Diagnosis Date    Asthma     Chest discomfort     pt states off & on for few months with or without activity    CHF (congestive heart failure) ([facility])     DM (diabetes mellitus) ([facility] [address]     GERD (gastroesophageal reflux disease)     HTN (hypertension)     Thyroid disease        Past Surgical History:  Past Surgical History:   Procedure Laterality Date    HX HYSTERECTOMY      HX MOHS PROCEDURES         Family History:  Family History   Problem Relation Age of Onset    Hypertension Mother     Diabetes Mother     Heart Disease Mother        Social History:  Social History     Tobacco Use    Smoking status: Never    Smokeless tobacco: Never   Substance Use Topics    Alcohol use: No    Drug use: Never       Allergies:  No Known Allergies      Review of Systems   Review of Systems   Constitutional:  Negative for chills and fever. HENT:  Negative for rhinorrhea and sore throat. Eyes:  Negative for discharge. Respiratory:  Positive for shortness of breath. Cardiovascular:  Negative for chest pain. Gastrointestinal:  Negative for abdominal pain and vomiting. Genitourinary:  Negative for dysuria. Musculoskeletal:  Negative for joint swelling. Skin:  Negative for rash.    Neurological:  Negative for headaches. Psychiatric/Behavioral:  Negative for suicidal ideas. All other systems reviewed and are negative. Physical Exam   Physical Exam  Vitals and nursing note reviewed. Constitutional:       General: She is not in acute distress. Appearance: Normal appearance. She is not ill-appearing, toxic-appearing or diaphoretic. HENT:      Head: Normocephalic and atraumatic. Nose: Nose normal.      Mouth/Throat:      Mouth: Mucous membranes are moist.   Eyes:      Conjunctiva/sclera: Conjunctivae normal.   Cardiovascular:      Rate and Rhythm: Regular rhythm. Tachycardia present. Heart sounds: Normal heart sounds. Pulmonary:      Effort: Pulmonary effort is normal.      Breath sounds: Rales present. Abdominal:      General: Abdomen is flat. Bowel sounds are normal. There is no distension. Palpations: Abdomen is soft. Tenderness: There is no abdominal tenderness. There is no guarding or rebound. Musculoskeletal:      Cervical back: Neck supple. Comments: Bilateral lower leg with a 2+ pitting edema   Skin:     General: Skin is warm and dry. Neurological:      General: No focal deficit present. Mental Status: She is alert and oriented to person, place, and time. Psychiatric:         Behavior: Behavior normal.         Thought Content:  Thought content normal.       Diagnostic Study Results     Labs -     Recent Results (from the past 12 hour(s))   CBC WITH AUTOMATED DIFF    Collection Time: 08/12/22  9:00 PM   Result Value Ref Range    WBC 7.4 3.6 - 11.0 K/uL    RBC 4.11 3.80 - 5.20 M/uL    HGB 12.4 11.5 - 16.0 g/dL    HCT 39.6 35.0 - 47.0 %    MCV 96.4 80.0 - 99.0 FL    MCH 30.2 26.0 - 34.0 PG    MCHC 31.3 30.0 - 36.5 g/dL    RDW 14.4 11.5 - 14.5 %    PLATELET 330 999 - 031 K/uL    MPV 10.9 8.9 - 12.9 FL    NEUTROPHILS 79 (H) 32 - 75 %    LYMPHOCYTES 12 12 - 49 %    MONOCYTES 8 5 - 13 %    EOSINOPHILS 1 0 - 7 %    BASOPHILS 0 0 - 1 %    IMMATURE GRANULOCYTES 0 0.0 - 0.5 %    ABS. NEUTROPHILS 5.8 1.8 - 8.0 K/UL    ABS. LYMPHOCYTES 0.9 0.8 - 3.5 K/UL    ABS. MONOCYTES 0.6 0.0 - 1.0 K/UL    ABS. EOSINOPHILS 0.1 0.0 - 0.4 K/UL    ABS. BASOPHILS 0.0 0.0 - 0.1 K/UL    ABS. IMM. GRANS. 0.0 0.00 - 0.04 K/UL    DF AUTOMATED     TROPONIN-HIGH SENSITIVITY    Collection Time: 08/12/22  9:00 PM   Result Value Ref Range    Troponin-High Sensitivity 87 (H) 0 - 51 ng/L   METABOLIC PANEL, BASIC    Collection Time: 08/12/22  9:00 PM   Result Value Ref Range    Sodium 140 136 - 145 mmol/L    Potassium 3.7 3.5 - 5.1 mmol/L    Chloride 107 97 - 108 mmol/L    CO2 24 21 - 32 mmol/L    Anion gap 9 5 - 15 mmol/L    Glucose 150 (H) 65 - 100 mg/dL    BUN 18 6 - 20 mg/dL    Creatinine 1.15 (H) 0.55 - 1.02 mg/dL    BUN/Creatinine ratio 16 12 - 20      GFR est AA 55 (L) >60 ml/min/1.73m2    GFR est non-AA 46 (L) >60 ml/min/1.73m2    Calcium 9.2 8.5 - 10.1 mg/dL   HEPATIC FUNCTION PANEL    Collection Time: 08/12/22  9:00 PM   Result Value Ref Range    Protein, total 6.9 6.4 - 8.2 g/dL    Albumin 3.4 (L) 3.5 - 5.0 g/dL    Globulin 3.5 2.0 - 4.0 g/dL    A-G Ratio 1.0 (L) 1.1 - 2.2      Bilirubin, total 1.4 (H) 0.2 - 1.0 mg/dL    Bilirubin, direct 0.5 (H) 0.0 - 0.2 mg/dL    Alk.  phosphatase 49 45 - 117 U/L    AST (SGOT) 46 (H) 15 - 37 U/L    ALT (SGPT) 53 12 - 78 U/L   TSH 3RD GENERATION    Collection Time: 08/12/22  9:00 PM   Result Value Ref Range    TSH 9.58 (H) 0.36 - 3.74 uIU/mL   NT-PRO BNP    Collection Time: 08/12/22 10:45 PM   Result Value Ref Range    NT pro-BNP 10,221 (H) <450 pg/mL   TROPONIN-HIGH SENSITIVITY    Collection Time: 08/12/22 10:59 PM   Result Value Ref Range    Troponin-High Sensitivity 136 (HH) 0 - 51 ng/L   TROPONIN-HIGH SENSITIVITY    Collection Time: 08/13/22  5:30 AM   Result Value Ref Range    Troponin-High Sensitivity 180 (HH) 0 - 51 ng/L       Radiologic Studies -   [unfilled]  CT Results  (Last 48 hours)                 08/12/22 2117  CT HEAD WO CONT Final result    Impression: No acute intracranial hemorrhage, mass or infarct. Nonspecific white   matter change most compatible with chronic small vessel ischemic and/or   senescent change. Intracranial atherosclerosis. Narrative:  EXAM: Brain CT without contrast.       INDICATION: dizziness        TECHNIQUE: Noncontrast CT of the brain is performed with 5 mm collimation. Bone   algorithm axial and sagittal and coronal reconstructions performed and   evaluated. CT dose reduction was achieved through use of a standardized   protocol tailored for this examination and automatic exposure control for dose   modulation. COMPARISON: None       FINDINGS: There is no acute intracranial hemorrhage, mass, mass effect or   herniation. Ventricles and sulci show proportionate and symmetric appearance. There is periventricular white matter hypodensity. The gray-white matter   differentiation is well-preserved. Atherosclerotic calcifications are seen   within the carotid siphons. The mastoid air cells are well pneumatized. The   visualized paranasal sinuses are normal.                 CXR Results  (Last 48 hours)                 08/12/22 2109  XR CHEST PORT Final result    Impression:  Findings suspicious for congestive failure with interstitial and   alveolar edema and small pleural effusions though infectious infiltrate is not   excluded. Narrative:  EXAM: XR CHEST PORT       INDICATION: sob       COMPARISON: None. FINDINGS: A portable AP radiograph of the chest was obtained at 21:18 hours. The   patient is on a cardiac monitor. There is interstitial prominence with small   patchy areas of airspace opacity at the perihilar regions and probable pleural   effusions with pulmonary vascular congestion. The cardiac and mediastinal   contours are normal.  The bones and soft tissues are grossly within normal   limits. Medical Decision Making and ED Course   I am the first provider for this patient.     I reviewed the vital signs, available nursing notes, past medical history, past surgical history, family history and social history. Patient with a history of congestive heart failure presents emergency room with acute onset CHF. Clinical exam and a chest x-ray is consistent with pulmonary edema. Clinically patient is not in respiratory distress despite hypoxia of 93% on room air. Patient was placed on 2 L of O2 and given a dose of IV Lasix from which she diuresed well. Clonidine and nitropaste was given to improve her hypertensive urgency. I discussed with the hospitalist for admission. Vital Signs-Reviewed the patient's vital signs. Records Reviewed: Nursing note reviewed      ED Course:   Initial assessment performed. The patients presenting problems have been discussed, and they are in agreement with the care plan formulated and outlined with them. I have encouraged them to ask questions as they arise throughout their visit. Procedures     CRITICAL CARE NOTE :  11:21 PM  Amount of Critical Care Time: __30  __(minutes)__    IMPENDING DETERIORATION -Respiratory and Cardiovascular  ASSOCIATED RISK FACTORS - Hypoxia and Dysrhythmia  MANAGEMENT- Bedside Assessment, Supervision of Care, and Transfer  INTERPRETATION -  Xrays, ECG, and Blood Pressure  INTERVENTIONS - hemodynamic mngmt  CASE REVIEW - Hospitalist/Intensivist  TREATMENT RESPONSE -Improved  PERFORMED BY - Self    NOTES   :  I have spent critical care time involved in lab review, consultations with specialist, family decision- making, bedside attention and documentation. This time excludes time spent in any separate billed procedures. During this entire length of time I was immediately available to the patient . Dodie Dias MD                Disposition     Admitted      DISCHARGE PLAN:  1.    Current Discharge Medication List        CONTINUE these medications which have NOT CHANGED    Details   traMADol-acetaminophen (ULTRACET) 37.5-325 mg per tablet Take 1 Tablet by mouth every six (6) hours as needed for Pain.      hydrALAZINE (APRESOLINE) 25 mg tablet Take 25 mg by mouth three (3) times daily. lisinopriL (PRINIVIL, ZESTRIL) 5 mg tablet Take 5 mg by mouth daily. MAGNESIUM OXIDE, BULK, 200 mg by Does Not Apply route daily. meclizine (ANTIVERT) 25 mg tablet Take 25 mg by mouth two (2) times daily as needed for Dizziness. DULoxetine (CYMBALTA) 30 mg capsule Take 30 mg by mouth daily. VENTOLIN HFA 90 mcg/actuation inhaler Take 2 Puffs by inhalation as needed. Refills: 1      nitroglycerin (NITROSTAT) 0.4 mg SL tablet by SubLINGual route every five (5) minutes as needed for Chest Pain.      metolazone (ZAROXOLYN) 5 mg tablet Take 5 mg by mouth every Monday, Wednesday, Friday. omeprazole (PRILOSEC) 20 mg capsule Take 20 mg by mouth daily. furosemide (LASIX) 40 mg tablet Take 40 mg by mouth as needed. Every 6-8 hours as needed per Dr. Hong Nava office note      losartan (COZAAR) 50 mg tablet Take 50 mg by mouth daily. carvedilol (COREG) 6.25 mg tablet Take 12.5 mg by mouth two (2) times daily (with meals). clopidogrel (PLAVIX) 75 mg tablet Take 75 mg by mouth daily. potassium chloride 20 mEq TbER Take  by mouth. allopurinol (ZYLOPRIM) 100 mg tablet Take  by mouth daily. amLODIPine (NORVASC) 5 mg tablet Take 5 mg by mouth daily. aspirin delayed-release 81 mg tablet Take 81 mg by mouth daily. sitaGLIPtin (JANUVIA) 100 mg tablet Take 50 mg by mouth daily. levothyroxine (SYNTHROID) 50 mcg tablet Take 1 Tab by mouth Daily (before breakfast). WITH WATER ONLY  Qty: 30 Tab, Refills: 6           2. Follow-up Information    None       3. Return to ED if worse     Diagnosis     Clinical Impression:   1. SOB (shortness of breath)    2. Congestive heart failure, unspecified HF chronicity, unspecified heart failure type ([facility] [address]    3. Hypoxia    4. Hypertensive urgency    5.  NSTEMI (non-ST elevated myocardial infarction) ([facility]        Attestations:    Nino Chavira MD    Please note that this dictation was completed with [employer], the computer voice recognition software. Quite often unanticipated grammatical, syntax, homophones, and other interpretive errors are inadvertently transcribed by the computer software. Please disregard these errors. Please excuse any errors that have escaped final proofreading. Thank you.

## 2022-08-13 NOTE — ED NOTES
Assumed care of pt, report received from Ivelisse Rosas RN.  Pt lying on stretcher, self-removed cardiac and spo2 monitor, self removed IV lock, self removed purwick suction device, respirations even and unlabored, skin warm and dry to touch

## 2022-08-13 NOTE — PROGRESS NOTES
PT arrived on unit. Pure wic hooked and skin check done. Pt has some discoloration in sacral area and spots scabbed on fourth right and left toes. She also has little skin shearing of the epidermis pinky toe right side  Pt has nitro patch and is being hooked up to cardiac monitor. PT is alert and oriented and granddaughter at bedside. Report given BY EMS that the pt came from home to free standing clinic with chest pain and being transferred here for higher level of care. Granddaughter states that the pt has a stent from previous cardiac event earlier this year but doesn't know the date. PT lives at home with family member one story.

## 2022-08-13 NOTE — ED TRIAGE NOTES
Walked to the mailbox immediately PTA and began feeling dizzy and SOB. SOB continues, but less dizzy at triage.

## 2022-08-13 NOTE — PROGRESS NOTES
Problem: Pressure Injury - Risk of  Goal: *Prevention of pressure injury  Description: Document Marco Scale and appropriate interventions in the flowsheet. Outcome: Progressing Towards Goal  Note: Pressure Injury Interventions:  Sensory Interventions: Float heels, Turn and reposition approx.  every two hours (pillows and wedges if needed), Use 30-degree side-lying position, Check visual cues for pain, Avoid rigorous massage over bony prominences, Assess changes in LOC    Moisture Interventions: Limit adult briefs, Minimize layers, Absorbent underpads, Internal/External urinary devices    Activity Interventions: Increase time out of bed    Mobility Interventions: HOB 30 degrees or less    Nutrition Interventions: Document food/fluid/supplement intake    Friction and Shear Interventions: HOB 30 degrees or less, Minimize layers

## 2022-08-13 NOTE — ED NOTES
Pt report given to Dorcas Mejia RN. Pt lying on stretcher, sleeping, no acute distress, cardiac, spo2, nbp monitoring, O2 nasal cannula in place at 2L, Pt respirations even and unlabored, skin color normal for ethinicity, warm and dry to touch.

## 2022-08-14 ENCOUNTER — APPOINTMENT (OUTPATIENT)
Dept: NON INVASIVE DIAGNOSTICS | Age: 77
End: 2022-08-14
Attending: INTERNAL MEDICINE
Payer: MEDICARE

## 2022-08-14 PROBLEM — R79.89 ELEVATED TROPONIN: Status: ACTIVE | Noted: 2022-08-14

## 2022-08-14 PROBLEM — R77.8 ELEVATED TROPONIN: Status: ACTIVE | Noted: 2022-08-14

## 2022-08-14 PROBLEM — I48.91 A-FIB (HCC): Status: ACTIVE | Noted: 2022-08-14

## 2022-08-14 LAB
ATRIAL RATE: 96 BPM
BNP SERPL-MCNC: 5565 PG/ML
CALCULATED R AXIS, ECG10: -2 DEGREES
CALCULATED T AXIS, ECG11: -4 DEGREES
DIAGNOSIS, 93000: NORMAL
ECHO AO ROOT DIAM: 3 CM
ECHO AO ROOT INDEX: 1.78 CM/M2
ECHO AR MAX VEL PISA: 4.6 M/S
ECHO AV PEAK GRADIENT: 6 MMHG
ECHO AV PEAK VELOCITY: 1.2 M/S
ECHO AV REGURGITANT PHT: 866 MS
ECHO AV VELOCITY RATIO: 0.5
ECHO EST RA PRESSURE: 15 MMHG
ECHO LA DIAMETER INDEX: 2.78 CM/M2
ECHO LA DIAMETER: 4.7 CM
ECHO LA TO AORTIC ROOT RATIO: 1.57
ECHO LV FRACTIONAL SHORTENING: 4 % (ref 28–44)
ECHO LV INTERNAL DIMENSION DIASTOLE INDEX: 2.78 CM/M2
ECHO LV INTERNAL DIMENSION DIASTOLIC: 4.7 CM (ref 3.9–5.3)
ECHO LV INTERNAL DIMENSION SYSTOLIC INDEX: 2.66 CM/M2
ECHO LV INTERNAL DIMENSION SYSTOLIC: 4.5 CM
ECHO LV IVSD: 1.2 CM (ref 0.6–0.9)
ECHO LV MASS 2D: 224.8 G (ref 67–162)
ECHO LV MASS INDEX 2D: 133 G/M2 (ref 43–95)
ECHO LV POSTERIOR WALL DIASTOLIC: 1.3 CM (ref 0.6–0.9)
ECHO LV RELATIVE WALL THICKNESS RATIO: 0.55
ECHO LVOT PEAK GRADIENT: 2 MMHG
ECHO LVOT PEAK VELOCITY: 0.6 M/S
ECHO MV E DECELERATION TIME (DT): 100 MS
ECHO MV E VELOCITY: 1.4 M/S
ECHO PV MAX VELOCITY: 0.7 M/S
ECHO PV PEAK GRADIENT: 2 MMHG
ECHO RIGHT VENTRICULAR SYSTOLIC PRESSURE (RVSP): 66 MMHG
ECHO RV INTERNAL DIMENSION: 3.4 CM
ECHO TV REGURGITANT MAX VELOCITY: 3.57 M/S
ECHO TV REGURGITANT PEAK GRADIENT: 51 MMHG
EST. AVERAGE GLUCOSE BLD GHB EST-MCNC: 108 MG/DL
HBA1C MFR BLD: 5.4 % (ref 4–5.6)
Q-T INTERVAL, ECG07: 404 MS
QRS DURATION, ECG06: 78 MS
QTC CALCULATION (BEZET), ECG08: 510 MS
TROPONIN-HIGH SENSITIVITY: 73 NG/L (ref 0–51)
VENTRICULAR RATE, ECG03: 96 BPM

## 2022-08-14 PROCEDURE — 93005 ELECTROCARDIOGRAM TRACING: CPT

## 2022-08-14 PROCEDURE — 84484 ASSAY OF TROPONIN QUANT: CPT

## 2022-08-14 PROCEDURE — 83036 HEMOGLOBIN GLYCOSYLATED A1C: CPT

## 2022-08-14 PROCEDURE — 96372 THER/PROPH/DIAG INJ SC/IM: CPT

## 2022-08-14 PROCEDURE — 74011250637 HC RX REV CODE- 250/637: Performed by: INTERNAL MEDICINE

## 2022-08-14 PROCEDURE — 83880 ASSAY OF NATRIURETIC PEPTIDE: CPT

## 2022-08-14 PROCEDURE — 36415 COLL VENOUS BLD VENIPUNCTURE: CPT

## 2022-08-14 PROCEDURE — G0378 HOSPITAL OBSERVATION PER HR: HCPCS

## 2022-08-14 PROCEDURE — 74011250636 HC RX REV CODE- 250/636: Performed by: NURSE PRACTITIONER

## 2022-08-14 PROCEDURE — 93306 TTE W/DOPPLER COMPLETE: CPT

## 2022-08-14 RX ORDER — ONDANSETRON 4 MG/1
4 TABLET, FILM COATED ORAL
Status: DISCONTINUED | OUTPATIENT
Start: 2022-08-14 | End: 2022-08-16 | Stop reason: HOSPADM

## 2022-08-14 RX ORDER — POLYETHYLENE GLYCOL 3350 17 G/17G
17 POWDER, FOR SOLUTION ORAL DAILY PRN
Status: DISCONTINUED | OUTPATIENT
Start: 2022-08-14 | End: 2022-08-16 | Stop reason: HOSPADM

## 2022-08-14 RX ORDER — ENOXAPARIN SODIUM 100 MG/ML
30 INJECTION SUBCUTANEOUS EVERY 24 HOURS
Status: DISCONTINUED | OUTPATIENT
Start: 2022-08-14 | End: 2022-08-14

## 2022-08-14 RX ORDER — ENOXAPARIN SODIUM 100 MG/ML
40 INJECTION SUBCUTANEOUS EVERY 24 HOURS
Status: DISCONTINUED | OUTPATIENT
Start: 2022-08-14 | End: 2022-08-16 | Stop reason: HOSPADM

## 2022-08-14 RX ORDER — ACETAMINOPHEN 325 MG/1
650 TABLET ORAL
Status: DISCONTINUED | OUTPATIENT
Start: 2022-08-14 | End: 2022-08-16 | Stop reason: HOSPADM

## 2022-08-14 RX ADMIN — CARVEDILOL 12.5 MG: 12.5 TABLET, FILM COATED ORAL at 09:00

## 2022-08-14 RX ADMIN — ATORVASTATIN CALCIUM 40 MG: 40 TABLET, FILM COATED ORAL at 21:12

## 2022-08-14 RX ADMIN — CARVEDILOL 12.5 MG: 12.5 TABLET, FILM COATED ORAL at 16:47

## 2022-08-14 RX ADMIN — LOSARTAN POTASSIUM 50 MG: 50 TABLET, FILM COATED ORAL at 09:27

## 2022-08-14 RX ADMIN — ENOXAPARIN SODIUM 40 MG: 100 INJECTION SUBCUTANEOUS at 16:48

## 2022-08-14 RX ADMIN — SPIRONOLACTONE 12.5 MG: 25 TABLET ORAL at 09:27

## 2022-08-14 RX ADMIN — ASPIRIN 81 MG CHEWABLE TABLET 81 MG: 81 TABLET CHEWABLE at 09:27

## 2022-08-14 NOTE — PROGRESS NOTES
Medicare Outpatient Observation Notice (MOON)/ [place] Outpatient Observation Notice (Marta Olivas) provided to patient/representative with verbal explanation of the notice. Time allotted for questions regarding the notice. Patient /representative provided a completed copy of the MOON/VOON notice. Copy placed on bedside chart.

## 2022-08-14 NOTE — PROGRESS NOTES
Problem: Pressure Injury - Risk of  Goal: *Prevention of pressure injury  Description: Document Marco Scale and appropriate interventions in the flowsheet.   Outcome: Progressing Towards Goal  Note: Pressure Injury Interventions:  Sensory Interventions: Float heels    Moisture Interventions: Internal/External urinary devices, Absorbent underpads    Activity Interventions: Increase time out of bed, PT/OT evaluation    Mobility Interventions: HOB 30 degrees or less, Float heels, PT/OT evaluation    Nutrition Interventions: Document food/fluid/supplement intake    Friction and Shear Interventions: HOB 30 degrees or less

## 2022-08-14 NOTE — PROGRESS NOTES
Progress Note      8/14/2022 7:54 AM  NAME: Char Castillo   MRN:  [id number]   Admit Diagnosis: [facility]congest[facility] [I50.9]  Hypoxia [R09.02]      Subjective:   Covering for Dr. Junito Villalobos. Chart reviewed. Discussed with the patient. She feels somewhat better. Still has some shortness of breath. Had any palpitations. Review of Systems:    Symptom Y/N Comments  Symptom Y/N Comments   Fever/Chills n   Chest Pain n    Poor Appetite    Edema     Cough    Abdominal Pain n    Sputum    Joint Pain     SOB/BLAIR y Improving  Pruritis/Rash     Nausea/vomit    Other     Diarrhea         Constipation           Could NOT obtain due to:      Objective:          Physical Exam:    Last 24hrs VS reviewed since prior progress note. Most recent are:    Visit Vitals  /76 (BP 1 Location: Right upper arm, BP Patient Position: At rest)   Pulse 87   Temp 98.2 °F (36.8 °C)   Resp 18   Ht 5' 2\" (1.575 m)   Wt 68 kg (150 lb)   SpO2 96%   Breastfeeding No   BMI 27.44 kg/m²       Intake/Output Summary (Last 24 hours) at 8/14/2022 0754  Last data filed at 8/13/2022 0810  Gross per 24 hour   Intake --   Output 800 ml   Net -800 ml        General Appearance: Well developed, well nourished, alert & oriented x 3,    no acute distress. Ears/Nose/Mouth/Throat: Hearing grossly normal.  Neck: Supple. Chest: Lungs clear to auscultation bilaterally. Cardiovascular: Regular rate and rhythm, S1,S2 normal, no murmur. Abdomen: Soft, non-tender, bowel sounds are active. Extremities: No edema bilaterally. Skin: Warm and dry. []         Post-cath site without hematoma, bruit, tenderness, or thrill. Distal pulses intact.     PMH/SH reviewed - no change compared to H&P    Data Review    Telemetry: normal sinus rhythm     EKG:   []  No new EKG for review    Lab Data Personally Reviewed:    Recent Labs     08/12/22  2100   WBC 7.4   HGB 12.4   HCT 39.6        No results for input(s): INR, PTP, APTT, INREXT in the last 72 hours. Recent Labs     08/12/22  2100      K 3.7      CO2 24   BUN 18   CREA 1.15*   *   CA 9.2     No results for input(s): CPK, CKNDX, TROIQ in the last 72 hours. No lab exists for component: CPKMB  No results found for: CHOL, CHOLX, CHLST, CHOLV, HDL, HDLP, LDL, LDLC, DLDLP, TGLX, TRIGL, TRIGP, CHHD, CHHDX    Recent Labs     08/12/22  2100   AP 49   TP 6.9   ALB 3.4*   GLOB 3.5     No results for input(s): PH, PCO2, PO2 in the last 72 hours. Medications Personally Reviewed:    Current Facility-Administered Medications   Medication Dose Route Frequency    losartan (COZAAR) tablet 50 mg  50 mg Oral DAILY    carvediloL (COREG) tablet 12.5 mg  12.5 mg Oral BID WITH MEALS    spironolactone (ALDACTONE) tablet 12.5 mg  12.5 mg Oral DAILY    atorvastatin (LIPITOR) tablet 40 mg  40 mg Oral QHS    aspirin chewable tablet 81 mg  81 mg Oral DAILY           Problem List:   Decompensated heart failure. Probably patient had a paroxysmal atrial fibrillation versus atrial tachycardia. Hypertension is well controlled. Diabetes mellitus type 2. Minimal elevation of troponin I is not impressive for myocardial infarction. Assessment/Plan:   Will continue telemetry monitoring. Will check echocardiogram when available. Otherwise continue present care. Thank you. []       High complexity decision making was performed in this patient at high risk for decompensation with multiple organ involvement.     Dontae Zamudio MD

## 2022-08-14 NOTE — H&P
Katelyn RONQUILLO, JESENIAP-C    History and Physical      Patient: Jarad Swan MRN: [id number]  SSN: [id number]    YOB: 1945  Age: 67 y.o. Sex: female        Chief Complaint   Patient presents with    Dizziness    Shortness of Breath       Subjective:      Jarad Swan is a 67 y.o. female who presents to the Ed with cc of new onset sob and light headedness while ambulating to mailbox. She reports she was in good health prior to this episode. Endorses no slurred speech, syncope, motor dysfunction, headache. PMH significant for  CHF, DM, HTN, thyroid disease. In the ED head ct negative for acute findings. Intracranial atherosclerosis. Routine lab work shows fidelia, elevated proBNP, and elevated troponin x 3. Hospitalist team consulted for further evaluation. Patient elecats full code. Past Medical History:   Diagnosis Date    Asthma     Chest discomfort     pt states off & on for few months with or without activity    CHF (congestive heart failure) (HCC)     DM (diabetes mellitus) ([facility] [address]     GERD (gastroesophageal reflux disease)     HTN (hypertension)     Thyroid disease      Past Surgical History:   Procedure Laterality Date    HX HYSTERECTOMY      HX MOHS PROCEDURES        Family History   Problem Relation Age of Onset    Hypertension Mother     Diabetes Mother     Heart Disease Mother      Social History     Tobacco Use    Smoking status: Never    Smokeless tobacco: Never   Substance Use Topics    Alcohol use: No      Prior to Admission medications    Medication Sig Start Date End Date Taking? Authorizing Provider   traMADol-acetaminophen (ULTRACET) 37.5-325 mg per tablet Take 1 Tablet by mouth every six (6) hours as needed for Pain. Provider, Historical   hydrALAZINE (APRESOLINE) 25 mg tablet Take 25 mg by mouth three (3) times daily. Provider, Historical   lisinopriL (PRINIVIL, ZESTRIL) 5 mg tablet Take 5 mg by mouth daily.     Provider, Historical   MAGNESIUM OXIDE, BULK, 200 mg by Does Not Apply route daily. Provider, Historical   meclizine (ANTIVERT) 25 mg tablet Take 25 mg by mouth two (2) times daily as needed for Dizziness. Patient not taking: Reported on 10/27/2021    Provider, Historical   DULoxetine (CYMBALTA) 30 mg capsule Take 30 mg by mouth daily. Provider, Historical   VENTOLIN HFA 90 mcg/actuation inhaler Take 2 Puffs by inhalation as needed. 2/24/16   Provider, Historical   nitroglycerin (NITROSTAT) 0.4 mg SL tablet by SubLINGual route every five (5) minutes as needed for Chest Pain. Provider, Historical   metolazone (ZAROXOLYN) 5 mg tablet Take 5 mg by mouth every Monday, Wednesday, Friday. Patient not taking: Reported on 10/26/2021    Provider, Historical   omeprazole (PRILOSEC) 20 mg capsule Take 20 mg by mouth daily. Patient not taking: Reported on 10/26/2021    Provider, Historical   furosemide (LASIX) 40 mg tablet Take 40 mg by mouth as needed. Every 6-8 hours as needed per Dr. Phyllis Chi office note    Provider, Historical   losartan (COZAAR) 50 mg tablet Take 50 mg by mouth daily. Provider, Historical   carvedilol (COREG) 6.25 mg tablet Take 12.5 mg by mouth two (2) times daily (with meals). Provider, Historical   clopidogrel (PLAVIX) 75 mg tablet Take 75 mg by mouth daily. Provider, Historical   potassium chloride 20 mEq TbER Take  by mouth. Patient not taking: Reported on 10/26/2021    Provider, Historical   allopurinol (ZYLOPRIM) 100 mg tablet Take  by mouth daily. Patient not taking: Reported on 10/26/2021    Provider, Historical   amLODIPine (NORVASC) 5 mg tablet Take 5 mg by mouth daily. Provider, Historical   aspirin delayed-release 81 mg tablet Take 81 mg by mouth daily. Provider, Historical   sitaGLIPtin (JANUVIA) 100 mg tablet Take 50 mg by mouth daily. Provider, Historical   levothyroxine (SYNTHROID) 50 mcg tablet Take 1 Tab by mouth Daily (before breakfast).  WITH WATER ONLY  Patient taking differently: Take 25 mcg by mouth Daily (before breakfast). WITH WATER ONLY 4/21/16   Lalit Chaparro MD        No Known Allergies    Review of Systems:  Review of Systems   Constitutional:  Negative for chills and fever. HENT:  Negative for ear pain, hearing loss and tinnitus. Respiratory:  Negative for cough and shortness of breath. Cardiovascular:  Negative for chest pain, palpitations and leg swelling. Gastrointestinal:  Negative for abdominal pain, heartburn, nausea and vomiting. Genitourinary:  Negative for dysuria. Musculoskeletal:  Negative for myalgias. Neurological:  Negative for dizziness and headaches. Objective:     Vitals:    08/14/22 0000 08/14/22 0256 08/14/22 0947 08/14/22 1236   BP:  137/76 137/76 (!) 141/77   Pulse: 98 87  70   Resp:  18  20   Temp:  98.2 °F (36.8 °C)  97.7 °F (36.5 °C)   SpO2:  96%  100%   Weight:   68 kg (150 lb)    Height:   5' 2\" (1.575 m)         Physical Exam:  Physical Exam  Vitals and nursing note reviewed. Constitutional:       Appearance: Normal appearance. Eyes:      Extraocular Movements: Extraocular movements intact. Cardiovascular:      Rate and Rhythm: Normal rate. Rhythm irregular. Abdominal:      General: Bowel sounds are normal.   Skin:     General: Skin is warm and dry. Neurological:      Mental Status: She is alert and oriented to person, place, and time.         Assessment:     Hospital Problems  Date Reviewed: 5/30/2016            Codes Class Noted POA    A-[facility]) ICD-10-CM: I48.91  ICD-9-CM: 427.31  8/14/2022 Unknown        Elevated troponin ICD-10-CM: R77.8  ICD-9-CM: 790.6  8/14/2022 Unknown        CHF (congestive heart failure) ([facility] [address] ICD-10-CM: I50.9  ICD-9-CM: 428.0  8/12/2022 Unknown        Hypoxia ICD-10-CM: R09.02  ICD-9-CM: 799.02  8/12/2022 Unknown         Chest pain  Elevated troponin  Trop x 3 elevated  Cardiology following  Obtain echocardiogram  Prn pain management  Continue asa, statin, coreg, losartan, cozaar  EKG shows ST w/ occasional PAC and SVT    CHF exacerbation  BNP declining  Continue above cardioprotective medications    Atrial fibrillation  Managed woth coreg and asa    CKD  Appears at baseline  Renally dose all medication to current gfr          Full Code  GI PROPHYLAXIS none needed  DVT PROPHYLAXIS scd'd  Home medications reviewed and reconciled    Above treatment plan reviewed and discussed with patient in detail at bedside, all questions answered.      Time Spent: > 75 minutes    Signed By: Jonny Swan NP     August 14, 2022

## 2022-08-15 LAB
ATRIAL RATE: 93 BPM
CALCULATED P AXIS, ECG09: 94 DEGREES
CALCULATED R AXIS, ECG10: 3 DEGREES
DIAGNOSIS, 93000: NORMAL
P-R INTERVAL, ECG05: 200 MS
Q-T INTERVAL, ECG07: 386 MS
QRS DURATION, ECG06: 82 MS
QTC CALCULATION (BEZET), ECG08: 479 MS
VENTRICULAR RATE, ECG03: 93 BPM

## 2022-08-15 PROCEDURE — 74011250636 HC RX REV CODE- 250/636: Performed by: NURSE PRACTITIONER

## 2022-08-15 PROCEDURE — 74011250637 HC RX REV CODE- 250/637: Performed by: INTERNAL MEDICINE

## 2022-08-15 PROCEDURE — 93005 ELECTROCARDIOGRAM TRACING: CPT

## 2022-08-15 PROCEDURE — G0378 HOSPITAL OBSERVATION PER HR: HCPCS

## 2022-08-15 PROCEDURE — 96372 THER/PROPH/DIAG INJ SC/IM: CPT

## 2022-08-15 PROCEDURE — 74011250637 HC RX REV CODE- 250/637: Performed by: NURSE PRACTITIONER

## 2022-08-15 RX ORDER — LEVOTHYROXINE SODIUM 25 UG/1
50 TABLET ORAL
Status: DISCONTINUED | OUTPATIENT
Start: 2022-08-15 | End: 2022-08-16 | Stop reason: HOSPADM

## 2022-08-15 RX ADMIN — ENOXAPARIN SODIUM 40 MG: 100 INJECTION SUBCUTANEOUS at 16:22

## 2022-08-15 RX ADMIN — ATORVASTATIN CALCIUM 40 MG: 40 TABLET, FILM COATED ORAL at 21:08

## 2022-08-15 RX ADMIN — LOSARTAN POTASSIUM 50 MG: 50 TABLET, FILM COATED ORAL at 09:37

## 2022-08-15 RX ADMIN — CARVEDILOL 12.5 MG: 12.5 TABLET, FILM COATED ORAL at 16:23

## 2022-08-15 RX ADMIN — SPIRONOLACTONE 12.5 MG: 25 TABLET ORAL at 09:37

## 2022-08-15 RX ADMIN — CARVEDILOL 12.5 MG: 12.5 TABLET, FILM COATED ORAL at 09:43

## 2022-08-15 RX ADMIN — ASPIRIN 81 MG CHEWABLE TABLET 81 MG: 81 TABLET CHEWABLE at 09:37

## 2022-08-15 RX ADMIN — LEVOTHYROXINE SODIUM 50 MCG: 0.03 TABLET ORAL at 16:23

## 2022-08-15 NOTE — PROGRESS NOTES
Problem: Pressure Injury - Risk of  Goal: *Prevention of pressure injury  Description: Document Marco Scale and appropriate interventions in the flowsheet. Outcome: Progressing Towards Goal  Note: Pressure Injury Interventions:  Sensory Interventions: Keep linens dry and wrinkle-free    Moisture Interventions: Internal/External urinary devices    Activity Interventions: Increase time out of bed, PT/OT evaluation    Mobility Interventions: HOB 30 degrees or less, PT/OT evaluation    Nutrition Interventions: Document food/fluid/supplement intake    Friction and Shear Interventions: HOB 30 degrees or less                Problem: Falls - Risk of  Goal: *Absence of Falls  Description: Document Kodi Fall Risk and appropriate interventions in the flowsheet.   Outcome: Progressing Towards Goal  Note: Fall Risk Interventions:            Medication Interventions: Bed/chair exit alarm, Patient to call before getting OOB    Elimination Interventions: Bed/chair exit alarm, Call light in reach

## 2022-08-15 NOTE — PROGRESS NOTES
Hospitalist Progress Note            Daily Progress Note: 8/15/2022 2:10 PM  Hospital course:     Lex Mendoza is a 79-year-old female with a PMH significant for asthma, CHF, DM, GERD, HTN and thyroid disease. She comes to the emergency room after an episode of shortness of breath with lightheadedness which happened after walking to her mailbox. Significant labs on admission reveal elevated BNP, elevated troponin, elevated TSH and transaminitis. On examination she was hypertensive, hypoxic with bilateral lower extremity pitting edema and respiratory Rales. Chest x-ray positive for vascular congestion probable pleural effusions. Placed on 2 L nasal cannula, given IV Lasix in the ED to improve hypertension and shortness of breath. She was admitted for further evaluation of non-STEMI, CHF exacerbation, hypoxia and A. fib. Cardiology was consulted. Severe systolic function reduction of EF 20 to 25%, severe global hypokinesis, severe MVR and moderately elevated RVSP of 66 mmHg, small pericardial effusion and left pleural effusion. Subjective:     Examined patient at bedside. She continues to be profoundly weak. Assessment/Plan:   Principal Problem:    CHF (congestive heart failure) ([address] (8/12/2022)    Active Problems:    Hypoxia (8/12/2022)      A-fib ([address] (8/14/2022)      Elevated troponin (8/14/2022)      Chest pain  Non-STEMI  CHF exacerbation  Shortness of breath  -Cardiology was consulted.     -2D echo reveals severe systolic function reduction of EF 20 to 25%, severe global hypokinesis, severe MVR and moderately elevated RVSP of 66 mmHg, small pericardial effusion and left pleural effusion.  -Elevated BNP  -Elevated troponins  -Received IV Lasix now on spironolactone  -Continue medications aspirin, statin, beta-blocker and ARB, spironolactone    Atrial fibrillation  Continue medications on Coreg    CKD  Appears at baseline    Generalized weakness  PT OT and case management for disposition needs        DVT Prophylaxis: SCDs  Code Status: Full Code  POA/NOK:    Disposition and discharge barriers:   PT OT and case management for disposition needs  Care Plan discussed with: Patient, staff, IDR team    Current Facility-Administered Medications   Medication Dose Route Frequency    levothyroxine (SYNTHROID) tablet 50 mcg  50 mcg Oral 6am    acetaminophen (TYLENOL) tablet 650 mg  650 mg Oral Q6H PRN    ondansetron hcl (ZOFRAN) tablet 4 mg  4 mg Oral Q6H PRN    polyethylene glycol (MIRALAX) packet 17 g  17 g Oral DAILY PRN    enoxaparin (LOVENOX) injection 40 mg  40 mg SubCUTAneous Q24H    losartan (COZAAR) tablet 50 mg  50 mg Oral DAILY    carvediloL (COREG) tablet 12.5 mg  12.5 mg Oral BID WITH MEALS    spironolactone (ALDACTONE) tablet 12.5 mg  12.5 mg Oral DAILY    atorvastatin (LIPITOR) tablet 40 mg  40 mg Oral QHS    aspirin chewable tablet 81 mg  81 mg Oral DAILY        REVIEW OF SYSTEMS    Review of Systems   Constitutional:  Positive for malaise/fatigue. Respiratory:  Negative for shortness of breath. Cardiovascular:  Positive for leg swelling. Negative for chest pain. Musculoskeletal:  Positive for myalgias. Neurological:  Positive for weakness. Objective:     Visit Vitals  /69   Pulse 81   Temp 97.6 °F (36.4 °C)   Resp 17   Ht 5' 2\" (1.575 m)   Wt 68 kg (150 lb)   SpO2 95%   Breastfeeding No   BMI 27.44 kg/m²    O2 Flow Rate (L/min): 2 l/min O2 Device: None (Room air)    Temp (24hrs), Av.7 °F (36.5 °C), Min:97.4 °F (36.3 °C), Max:97.9 °F (36.6 °C)      No intake/output data recorded.  1901 - 08/15 0700  In: -   Out: 600 [Urine:600]    PHYSICAL EXAM:    Physical Exam  Constitutional:       Appearance: She is ill-appearing. Cardiovascular:      Rate and Rhythm: Normal rate and regular rhythm. Pulmonary:      Effort: No respiratory distress. Abdominal:      General: There is distension. Tenderness: There is no abdominal tenderness.    Musculoskeletal:      Right lower leg: Edema present. Left lower leg: Edema present. Neurological:      Motor: Weakness present. Coordination: Coordination abnormal.      Gait: Gait abnormal.        Data Review    Recent Results (from the past 24 hour(s))   EKG, 12 LEAD, SUBSEQUENT    Collection Time: 08/15/22  7:56 AM   Result Value Ref Range    Ventricular Rate 93 BPM    Atrial Rate 93 BPM    P-R Interval 200 ms    QRS Duration 82 ms    Q-T Interval 386 ms    QTC Calculation (Bezet) 479 ms    Calculated P Axis 94 degrees    Calculated R Axis 3 degrees    Diagnosis       Sinus rhythm with Premature supraventricular complexes  Minimal voltage criteria for LVH, may be normal variant  ST & T wave abnormality, consider anterior ischemia  Prolonged QT  Abnormal ECG  When compared with ECG of 14-AUG-2022 07:54,  Sinus rhythm has replaced Atrial fibrillation  Confirmed by AP LICEA, Lujean Ganser ([id number]) on 8/15/2022 9:22:06 AM         CT HEAD WO CONT   Final Result   No acute intracranial hemorrhage, mass or infarct. Nonspecific white   matter change most compatible with chronic small vessel ischemic and/or   senescent change. Intracranial atherosclerosis. XR CHEST PORT   Final Result   Findings suspicious for congestive failure with interstitial and   alveolar edema and small pleural effusions though infectious infiltrate is not   excluded.                _____________________________________________________________________________  Time spent in direct care including coordination of service, review of data and examination: > 35 minutes    ______________________________________________________________________________    Rory Dominguez NP    This is dictation was done by dragon, computer voice recognition software. Quite often unanticipated grammatical, syntax, homophones and other interpretive errors or inadvertently transcribed by the computer software. Please excuse errors that have escaped final proofreading. Thank you.

## 2022-08-15 NOTE — PROGRESS NOTES
Progress Note      8/15/2022 7:54 AM  NAME: Vijay Lebron   MRN:  [id number]   Admit Diagnosis: CHF (congestive heart failure) ([facility]) [I50.9]  Hypoxia [R09.02]  A-fib (HCC) [I48.91]  Elevated troponin [R77.8]      Subjective:   Covering for Dr. Tin Ferrari. Chart reviewed. Discussed with the patient. She feels somewhat better. Still has some shortness of breath. Had any palpitations. Review of Systems:    Symptom Y/N Comments  Symptom Y/N Comments   Fever/Chills n   Chest Pain n    Poor Appetite    Edema     Cough    Abdominal Pain n    Sputum    Joint Pain     SOB/BLAIR y Improving  Pruritis/Rash     Nausea/vomit    Other     Diarrhea         Constipation           Could NOT obtain due to:      Objective:          Physical Exam:    Last 24hrs VS reviewed since prior progress note. Most recent are:    Visit Vitals  BP (!) 148/88   Pulse 69   Temp 97.4 °F (36.3 °C)   Resp 17   Ht 5' 2\" (1.575 m)   Wt 68 kg (150 lb)   SpO2 96%   Breastfeeding No   BMI 27.44 kg/m²       Intake/Output Summary (Last 24 hours) at 8/15/2022 1116  Last data filed at 8/14/2022 1819  Gross per 24 hour   Intake --   Output 600 ml   Net -600 ml          General Appearance: Well developed, well nourished, alert & oriented x 3,    no acute distress. Ears/Nose/Mouth/Throat: Hearing grossly normal.  Neck: Supple. Chest: Lungs clear to auscultation bilaterally. Cardiovascular: Regular rate and rhythm, S1,S2 normal, no murmur. Abdomen: Soft, non-tender, bowel sounds are active. Extremities: No edema bilaterally. Skin: Warm and dry. []         Post-cath site without hematoma, bruit, tenderness, or thrill. Distal pulses intact.     PMH/SH reviewed - no change compared to H&P    Data Review    Telemetry: normal sinus rhythm     EKG:   []  No new EKG for review    Lab Data Personally Reviewed:    Recent Labs     08/12/22  2100   WBC 7.4   HGB 12.4   HCT 39.6          No results for input(s): INR, PTP, APTT, INREXT, INREXT in the last 72 hours. Recent Labs     08/12/22  2100      K 3.7      CO2 24   BUN 18   CREA 1.15*   *   CA 9.2       No results for input(s): CPK, CKNDX, TROIQ in the last 72 hours. No lab exists for component: CPKMB  No results found for: CHOL, CHOLX, CHLST, CHOLV, HDL, HDLP, LDL, LDLC, DLDLP, TGLX, TRIGL, TRIGP, CHHD, CHHDX    Recent Labs     08/12/22  2100   AP 49   TP 6.9   ALB 3.4*   GLOB 3.5       No results for input(s): PH, PCO2, PO2 in the last 72 hours. Medications Personally Reviewed:    Current Facility-Administered Medications   Medication Dose Route Frequency    levothyroxine (SYNTHROID) tablet 50 mcg  50 mcg Oral 6am    acetaminophen (TYLENOL) tablet 650 mg  650 mg Oral Q6H PRN    ondansetron hcl (ZOFRAN) tablet 4 mg  4 mg Oral Q6H PRN    polyethylene glycol (MIRALAX) packet 17 g  17 g Oral DAILY PRN    enoxaparin (LOVENOX) injection 40 mg  40 mg SubCUTAneous Q24H    losartan (COZAAR) tablet 50 mg  50 mg Oral DAILY    carvediloL (COREG) tablet 12.5 mg  12.5 mg Oral BID WITH MEALS    spironolactone (ALDACTONE) tablet 12.5 mg  12.5 mg Oral DAILY    atorvastatin (LIPITOR) tablet 40 mg  40 mg Oral QHS    aspirin chewable tablet 81 mg  81 mg Oral DAILY             Problem List:   Decompensated heart failure. Probably patient had a paroxysmal atrial fibrillation versus atrial tachycardia. Hypertension is well controlled. Diabetes mellitus type 2. Minimal elevation of troponin I is not impressive for myocardial infarction. Ejection fraction is depressed and patient is clinically improving. Assessment/Plan:   Will continue telemetry monitoring. Will check echocardiogram when available. Otherwise continue present care. Thank you. []       High complexity decision making was performed in this patient at high risk for decompensation with multiple organ involvement.     Marjan Tan MD

## 2022-08-16 VITALS
WEIGHT: 150 LBS | HEIGHT: 62 IN | TEMPERATURE: 97.3 F | OXYGEN SATURATION: 97 % | BODY MASS INDEX: 27.6 KG/M2 | SYSTOLIC BLOOD PRESSURE: 123 MMHG | HEART RATE: 62 BPM | DIASTOLIC BLOOD PRESSURE: 63 MMHG | RESPIRATION RATE: 16 BRPM

## 2022-08-16 LAB
ANION GAP SERPL CALC-SCNC: 5 MMOL/L (ref 5–15)
BASOPHILS # BLD: 0 K/UL (ref 0–0.1)
BASOPHILS NFR BLD: 0 % (ref 0–1)
BUN SERPL-MCNC: 17 MG/DL (ref 6–20)
BUN/CREAT SERPL: 15 (ref 12–20)
CA-I BLD-MCNC: 8.8 MG/DL (ref 8.5–10.1)
CHLORIDE SERPL-SCNC: 104 MMOL/L (ref 97–108)
CO2 SERPL-SCNC: 33 MMOL/L (ref 21–32)
CREAT SERPL-MCNC: 1.1 MG/DL (ref 0.55–1.02)
DIFFERENTIAL METHOD BLD: NORMAL
EOSINOPHIL # BLD: 0.2 K/UL (ref 0–0.4)
EOSINOPHIL NFR BLD: 4 % (ref 0–7)
ERYTHROCYTE [DISTWIDTH] IN BLOOD BY AUTOMATED COUNT: 14.5 % (ref 11.5–14.5)
GLUCOSE SERPL-MCNC: 131 MG/DL (ref 65–100)
HCT VFR BLD AUTO: 38.8 % (ref 35–47)
HGB BLD-MCNC: 12.2 G/DL (ref 11.5–16)
IMM GRANULOCYTES # BLD AUTO: 0 K/UL (ref 0–0.04)
IMM GRANULOCYTES NFR BLD AUTO: 0 % (ref 0–0.5)
LYMPHOCYTES # BLD: 1.1 K/UL (ref 0.8–3.5)
LYMPHOCYTES NFR BLD: 24 % (ref 12–49)
MCH RBC QN AUTO: 29.8 PG (ref 26–34)
MCHC RBC AUTO-ENTMCNC: 31.4 G/DL (ref 30–36.5)
MCV RBC AUTO: 94.6 FL (ref 80–99)
MONOCYTES # BLD: 0.5 K/UL (ref 0–1)
MONOCYTES NFR BLD: 11 % (ref 5–13)
NEUTS SEG # BLD: 2.9 K/UL (ref 1.8–8)
NEUTS SEG NFR BLD: 61 % (ref 32–75)
NRBC # BLD: 0 K/UL (ref 0–0.01)
NRBC BLD-RTO: 0 PER 100 WBC
PLATELET # BLD AUTO: 167 K/UL (ref 150–400)
PMV BLD AUTO: 11.3 FL (ref 8.9–12.9)
POTASSIUM SERPL-SCNC: 3.5 MMOL/L (ref 3.5–5.1)
RBC # BLD AUTO: 4.1 M/UL (ref 3.8–5.2)
SODIUM SERPL-SCNC: 142 MMOL/L (ref 136–145)
WBC # BLD AUTO: 4.7 K/UL (ref 3.6–11)

## 2022-08-16 PROCEDURE — 74011250637 HC RX REV CODE- 250/637: Performed by: NURSE PRACTITIONER

## 2022-08-16 PROCEDURE — 85025 COMPLETE CBC W/AUTO DIFF WBC: CPT

## 2022-08-16 PROCEDURE — 97530 THERAPEUTIC ACTIVITIES: CPT

## 2022-08-16 PROCEDURE — G0378 HOSPITAL OBSERVATION PER HR: HCPCS

## 2022-08-16 PROCEDURE — 80048 BASIC METABOLIC PNL TOTAL CA: CPT

## 2022-08-16 PROCEDURE — 97165 OT EVAL LOW COMPLEX 30 MIN: CPT

## 2022-08-16 PROCEDURE — 97161 PT EVAL LOW COMPLEX 20 MIN: CPT

## 2022-08-16 PROCEDURE — 36415 COLL VENOUS BLD VENIPUNCTURE: CPT

## 2022-08-16 PROCEDURE — 74011250637 HC RX REV CODE- 250/637: Performed by: INTERNAL MEDICINE

## 2022-08-16 RX ORDER — ATORVASTATIN CALCIUM 40 MG/1
40 TABLET, FILM COATED ORAL
Qty: 30 TABLET | Refills: 0 | Status: SHIPPED | OUTPATIENT
Start: 2022-08-16

## 2022-08-16 RX ORDER — SPIRONOLACTONE 25 MG/1
12.5 TABLET ORAL DAILY
Qty: 30 TABLET | Refills: 0 | Status: SHIPPED | OUTPATIENT
Start: 2022-08-17

## 2022-08-16 RX ADMIN — LOSARTAN POTASSIUM 50 MG: 50 TABLET, FILM COATED ORAL at 08:26

## 2022-08-16 RX ADMIN — SPIRONOLACTONE 12.5 MG: 25 TABLET ORAL at 08:25

## 2022-08-16 RX ADMIN — ASPIRIN 81 MG CHEWABLE TABLET 81 MG: 81 TABLET CHEWABLE at 08:26

## 2022-08-16 RX ADMIN — LEVOTHYROXINE SODIUM 50 MCG: 0.03 TABLET ORAL at 05:43

## 2022-08-16 RX ADMIN — CARVEDILOL 12.5 MG: 12.5 TABLET, FILM COATED ORAL at 08:25

## 2022-08-16 NOTE — PROGRESS NOTES
Problem: Pressure Injury - Risk of  Goal: *Prevention of pressure injury  Description: Document Marco Scale and appropriate interventions in the flowsheet.   Outcome: Progressing Towards Goal  Note: Pressure Injury Interventions:  Sensory Interventions: Keep linens dry and wrinkle-free, Maintain/enhance activity level    Moisture Interventions: Internal/External urinary devices    Activity Interventions: Increase time out of bed, PT/OT evaluation    Mobility Interventions: HOB 30 degrees or less    Nutrition Interventions: Document food/fluid/supplement intake    Friction and Shear Interventions: HOB 30 degrees or less

## 2022-08-16 NOTE — PROGRESS NOTES
Progress Note      8/16/2022 7:54 AM  NAME: Rochelle Peace   MRN:  [id number]   Admit Diagnosis: CHF (congestive heart failure) ([facility]) [I50.9]  Hypoxia [R09.02]  A-fib (HCC) [I48.91]  Elevated troponin [R77.8]      Subjective:   Covering for Dr. Kirby Shelton. Chart reviewed. Discussed with the patient. She feels somewhat better. Still has some shortness of breath. Had any palpitations. Review of Systems:    Symptom Y/N Comments  Symptom Y/N Comments   Fever/Chills n   Chest Pain n    Poor Appetite    Edema     Cough    Abdominal Pain n    Sputum    Joint Pain     SOB/BLAIR y Improving  Pruritis/Rash     Nausea/vomit    Other     Diarrhea         Constipation           Could NOT obtain due to:      Objective:          Physical Exam:    Last 24hrs VS reviewed since prior progress note. Most recent are:    Visit Vitals  /63   Pulse 62   Temp 97.3 °F (36.3 °C)   Resp 16   Ht 5' 2\" (1.575 m)   Wt 68 kg (150 lb)   SpO2 97%   Breastfeeding No   BMI 27.44 kg/m²     No intake or output data in the 24 hours ending 08/16/22 1232       General Appearance: Well developed, well nourished, alert & oriented x 3,    no acute distress. Ears/Nose/Mouth/Throat: Hearing grossly normal.  Neck: Supple. Chest: Lungs clear to auscultation bilaterally. Cardiovascular: Regular rate and rhythm, S1,S2 normal, no murmur. Abdomen: Soft, non-tender, bowel sounds are active. Extremities: No edema bilaterally. Skin: Warm and dry. []         Post-cath site without hematoma, bruit, tenderness, or thrill. Distal pulses intact. PMH/SH reviewed - no change compared to H&P    Data Review    Telemetry: normal sinus rhythm     EKG:   []  No new EKG for review    Lab Data Personally Reviewed:    Recent Labs     08/16/22  0923   WBC 4.7   HGB 12.2   HCT 38.8          No results for input(s): INR, PTP, APTT, INREXT, INREXT in the last 72 hours.    Recent Labs     08/16/22  0923      K 3.5      CO2 33*   BUN 17   CREA 1.10*   *   CA 8.8       No results for input(s): CPK, CKNDX, TROIQ in the last 72 hours. No lab exists for component: CPKMB  No results found for: CHOL, CHOLX, CHLST, CHOLV, HDL, HDLP, LDL, LDLC, DLDLP, TGLX, TRIGL, TRIGP, CHHD, CHHDX    No results for input(s): AP, TBIL, TP, ALB, GLOB, GGT, AML, LPSE in the last 72 hours. No lab exists for component: SGOT, GPT, AMYP, HLPSE    No results for input(s): PH, PCO2, PO2 in the last 72 hours. Medications Personally Reviewed:    Current Facility-Administered Medications   Medication Dose Route Frequency    levothyroxine (SYNTHROID) tablet 50 mcg  50 mcg Oral 6am    acetaminophen (TYLENOL) tablet 650 mg  650 mg Oral Q6H PRN    ondansetron hcl (ZOFRAN) tablet 4 mg  4 mg Oral Q6H PRN    polyethylene glycol (MIRALAX) packet 17 g  17 g Oral DAILY PRN    enoxaparin (LOVENOX) injection 40 mg  40 mg SubCUTAneous Q24H    losartan (COZAAR) tablet 50 mg  50 mg Oral DAILY    carvediloL (COREG) tablet 12.5 mg  12.5 mg Oral BID WITH MEALS    spironolactone (ALDACTONE) tablet 12.5 mg  12.5 mg Oral DAILY    atorvastatin (LIPITOR) tablet 40 mg  40 mg Oral QHS    aspirin chewable tablet 81 mg  81 mg Oral DAILY             Problem List:   Decompensated heart failure. Probably patient had a paroxysmal atrial fibrillation versus atrial tachycardia. Hypertension is well controlled. Diabetes mellitus type 2. Minimal elevation of troponin I is not impressive for myocardial infarction. Ejection fraction is depressed and patient is clinically improving. Assessment/Plan:   Will continue telemetry monitoring. Will check echocardiogram when available. Otherwise continue present care. Thank you. []       High complexity decision making was performed in this patient at high risk for decompensation with multiple organ involvement.     Coleman Pool MD

## 2022-08-16 NOTE — PROGRESS NOTES
Problem: Pressure Injury - Risk of  Goal: *Prevention of pressure injury  Description: Document Marco Scale and appropriate interventions in the flowsheet.   Outcome: Progressing Towards Goal  Note: Pressure Injury Interventions:  Sensory Interventions: Assess changes in LOC, Float heels, Keep linens dry and wrinkle-free    Moisture Interventions: Internal/External urinary devices    Activity Interventions: Chair cushion, Increase time out of bed, PT/OT evaluation    Mobility Interventions: HOB 30 degrees or less, Float heels    Nutrition Interventions: Document food/fluid/supplement intake    Friction and Shear Interventions: HOB 30 degrees or less                Problem: Patient Education: Go to Patient Education Activity  Goal: Patient/Family Education  Outcome: Progressing Towards Goal     Problem: Patient Education: Go to Patient Education Activity  Goal: Patient/Family Education  Outcome: Progressing Towards Goal

## 2022-08-16 NOTE — PROGRESS NOTES
OCCUPATIONAL THERAPY EVALUATION  Patient: Martínez Estevez (68 y.o. female)  Date: 8/16/2022  Primary Diagnosis: CHF (congestive heart failure) ([facility]) [I50.9]  Hypoxia [R09.02]  A-fib (HCC) [I48.91]  Elevated troponin [R77.8]       Precautions: fall risk       ASSESSMENT  Pt is a 66 y/o F with PMH of HTN, asthma, CHF, thyroid disease, chest discomfort, GERD, and DM presenting to [facility] with c/o dizziness and SOB, admitted 08/12/22 and being treated for chest pain, elevated troponin, CHF exacerbation, atrial fibrillation, and CKD. Pt received semi-supine in bed upon arrival, AXO to person and place, disoriented to time, and agreeable to OT evaluation at this time. Per pt report, pt lives with her  and daughter, with granddaughter visiting occasionally, in a one-story home with 4 REFUGIO and B HR, was IND with ADLs and occasionally Mod I using SPC for mobility at [facility]. Pt has a tub/shower combination. Based on current observations, pt presents with deficits in generalized strength/AROM, balance, functional activity tolerance, minimally decreased coordination however still functional, and cognition impacting overall performance of ADLs and functional transfers/mobility. Pt currently requires SBA with additional time for bed mobility. Pt sits EOB and doffs/dons socks with CGA provided while pt leaned forward. Pt completes sit EOB>stand with no AD with min A. Pt ambulates to bathroom using no AD and HHA with CGA-min A. Pt completes toilet transfer with min A using grab bar and completes stefanie-care with SBA. Pt then stands at sink to wash hands with CGA. Pt ambulates back to bedside using no AD and HHA with min A and returns sitting EOB (stand>sit EOB) with min A. Pt then returns supine with SBA and additional time and washes face while semi-supine with set up. Overall, pt tolerates session fair.  Pt would benefit from continued skilled OT services to address current impairments and improve IND and safety with self cares and functional transfers/mobility. Recommend discharge to [address] [place] [address] with family A once medically appropriate. Other factors to consider for discharge: home support, PLOF, severity of deficits     Patient will benefit from skilled therapy intervention to address the above noted impairments. PLAN :  Recommendations and Planned Interventions: self care training, functional mobility training, therapeutic exercise, balance training, therapeutic activities, endurance activities, patient education, and home safety training    Frequency/Duration: Patient will be followed by occupational therapy:  3-5x/week to address goals. Recommendation for discharge: (in order for the patient to meet his/her long term goals)  Home with [address] [place] [address] and [facility]    This discharge recommendation:  Has been made in collaboration with the attending provider and/or case management    IF patient discharges home will need the following DME: May benefit from a shower chair       SUBJECTIVE:   Patient stated Sure let's walk to the bathroom.     OBJECTIVE DATA SUMMARY:   HISTORY:   Past Medical History:   Diagnosis Date    Asthma     Chest discomfort     pt states off & on for few months with or without activity    CHF (congestive heart failure) ([facility])     DM (diabetes mellitus) ([facility] [address]     GERD (gastroesophageal reflux disease)     HTN (hypertension)     Thyroid disease      Past Surgical History:   Procedure Laterality Date    HX HYSTERECTOMY      HX MOHS PROCEDURES         Expanded or extensive additional review of patient history:     Home Situation  Home Environment: Private residence  # Steps to Enter: 3  Rails to Enter: Yes  Hand Rails : Bilateral  One/Two Story Residence: One story  Living Alone: No  Support Systems: Spouse/Significant Other, Child(don), Other Family Member(s) ( and daughter, granddaughter visits)  Patient Expects to be Discharged to[de-identified] Home  Current DME Used/Available at Home: Cane, straight  Tub or Shower Type: Tub/Shower combination    Hand dominance: Right    EXAMINATION OF PERFORMANCE DEFICITS:  Cognitive/Behavioral Status:  Neurologic State: Alert  Orientation Level: Oriented to person;Oriented to place; Disoriented to time  Cognition: Follows commands        Safety/Judgement: Awareness of environment    Hearing: Auditory  Auditory Impairment: None    Range of Motion:  AROM: Generally decreased, functional    Strength:  Strength: Generally decreased, functional    Coordination:  Coordination: Generally decreased, functional  Fine Motor Skills-Upper: Left Intact; Right Intact    Gross Motor Skills-Upper: Left Intact; Right Intact    Tone & Sensation:  Tone: Normal  Sensation: Intact    Balance:  Sitting: Impaired; Without support  Sitting - Static: Good (unsupported)  Sitting - Dynamic: Fair (occasional) (CGA provided while leaning forward to don socks)  Standing: Impaired; Without support  Standing - Static: Fair  Standing - Dynamic : Fair    Functional Mobility and Transfers for ADLs:  Bed Mobility:  Rolling: Stand-by assistance  Supine to Sit: Stand-by assistance  Sit to Supine: Stand-by assistance  Scooting: Stand-by assistance    Transfers:  Sit to Stand: Contact guard assistance;Minimum assistance  Stand to Sit: Contact guard assistance;Minimum assistance  Bathroom Mobility: Minimum assistance  Toilet Transfer : Minimum assistance    ADL Intervention and task modifications:  Grooming  Position Performed: Standing; Other (comment) (Semi supine washing face, standing at sink washing hands)  Washing Face: Set-up  Washing Hands: Contact guard assistance    Lower Body Dressing Assistance  Socks: Contact guard assistance  Leg Crossed Method Used: Yes  Position Performed: Seated edge of bed    Toileting  Bladder Hygiene: Stand-by assistance    Cognitive Retraining  Safety/Judgement: Awareness of environment    Therapeutic Exercise:  Pt would benefit from UE HEP initiated at next session in prep for ADL's and functional mobility/transfers. Functional Measure:    [facility] AM-PACTM \"6 Clicks\"                                                       Daily Activity Inpatient Short Form  How much help from another person does the patient currently need. .. Total; A Lot A Little None   1. Putting on and taking off regular lower body clothing? []  1 []  2 [x]  3 []  4   2. Bathing (including washing, rinsing, drying)? []  1 []  2 [x]  3 []  4   3. Toileting, which includes using toilet, bedpan or urinal? [] 1 []  2 [x]  3 []  4   4. Putting on and taking off regular upper body clothing? []  1 []  2 []  3 [x]  4   5. Taking care of personal grooming such as brushing teeth? []  1 []  2 [x]  3 []  4   6. Eating meals? []  1 []  2 []  3 [x]  4   © , Trustees of [facility], under license to [employer]. All rights reserved     Score: 20/24     Interpretation of Tool:  Represents clinically-significant functional categories (i.e. Activities of daily living). Percentage of Impairment CH    0%   CI    1-19% CJ    20-39% CK    40-59% CL    60-79% CM    80-99% CN     100%   AMPAC  Score 6-24 24 23 20-22 15-19 10-14 7-9 6         Occupational Therapy Evaluation Charge Determination   History Examination Decision-Making   LOW Complexity : Brief history review  LOW Complexity : 1-3 performance deficits relating to physical, cognitive , or psychosocial skils that result in activity limitations and / or participation restrictions  LOW Complexity : No comorbidities that affect functional and no verbal or physical assistance needed to complete eval tasks       Based on the above components, the patient evaluation is determined to be of the following complexity level: LOW   Pain Ratin/10    Activity Tolerance:   Fair  Please refer to the flowsheet for vital signs taken during this treatment.     After treatment patient left in no apparent distress:    Supine in bed, Call bell within reach, Bed / chair alarm activated, and Side rails x 3    COMMUNICATION/EDUCATION:   The patients plan of care was discussed with: Physical therapist.     Patient/family agree to work toward stated goals and plan of care. This patients plan of care is appropriate for delegation to hospitals. Thank you for this referral.  Kimmy Ross OT  Time Calculation: 27 mins    Problem: Self Care Deficits Care Plan (Adult)  Goal: *Acute Goals and Plan of Care (Insert Text)  Description: Patient stated goal: To go home. 1. Pt will be IND sup <> sit in prep for EOB ADLs  2. Pt will be IND grooming sitting/standing at sink  3. Pt will be IND LE dressing sitting EOB/long sit  4. Pt will be IND sit <>  prep for toileting LRAD  5. Pt will be IND toileting/toilet transfer/cloth mgmt LRAD  6.  Pt will be IND following UE HEP in prep for self care tasks      Outcome: Not Met

## 2022-08-16 NOTE — PROGRESS NOTES
Patient recc for [facility] by PT, CM met with patient to discuss, she is agreeable, gave no preference for [facility] [place], referral sent via tere, awaiting possible acceptance. CM continues to follow and monitor for needs. 1:40 PM - Patient has been accepted with [address] [place] [address] 8/14/2022. Patient clear to d/c from  to home with [facility].   Patient notified of [facility] [place] agency and provided with phone number

## 2022-08-16 NOTE — PROGRESS NOTES
Discharge plan of care/case management plan validated with provider discharge order. I have reviewed discharge instructions with the patient. The patient verbalized understanding. Iv and tele removed. Granddaughter called and given update on patient and that patient being discharged. HH called and given report. All questions answered .

## 2022-08-16 NOTE — DISCHARGE SUMMARY
Hospitalist Discharge Summary     Patient ID:    Martínez Estevez  [id number]  67 y.o.  1945    Admit date: 8/12/2022    Discharge date : 8/16/2022      Final Diagnoses:   Principal Problem:    CHF (congestive heart failure) ([facility] (8/12/2022)    Active Problems:    Hypoxia (8/12/2022)      A-fib ([facility] (8/14/2022)      Elevated troponin (8/14/2022)        Reason for Hospitalization/Hospital Course:   Martínez Estevez is a 48-year-old female with a PMH significant for asthma, CHF, DM, GERD, HTN and thyroid disease. She comes to the emergency room after an episode of shortness of breath with lightheadedness which happened after walking to her mailbox. Significant labs on admission reveal elevated BNP, elevated troponin, elevated TSH and transaminitis. On examination she was hypertensive, hypoxic with bilateral lower extremity pitting edema and respiratory Rales. Chest x-ray positive for vascular congestion probable pleural effusions. Placed on 2 L nasal cannula, given IV Lasix in the ED to improve hypertension and shortness of breath. She was admitted for further evaluation of non-STEMI, CHF exacerbation, hypoxia and A. fib. Cardiology was consulted. Severe systolic function reduction of EF 20 to 25%, severe global hypokinesis, severe MVR and moderately elevated RVSP of 66 mmHg, small pericardial effusion and left pleural effusion. Evaluated by PT and OT, recommending home with home health services. Respiratory therapy assessed for home oxygen requirement. She saturated well on room air. Medications have been adjusted. Will discharge on spironolactone and statin. Will have her follow-up outpatient for further management. Discharge Medications:   Current Discharge Medication List        START taking these medications    Details   spironolactone (ALDACTONE) 25 mg tablet Take 0.5 Tablets by mouth in the morning.   Qty: 30 Tablet, Refills: 0  Start date: 8/17/2022      atorvastatin (LIPITOR) 40 mg tablet Take 1 Tablet by mouth nightly. Qty: 30 Tablet, Refills: 0  Start date: 8/16/2022           CONTINUE these medications which have NOT CHANGED    Details   traMADol-acetaminophen (ULTRACET) 37.5-325 mg per tablet Take 1 Tablet by mouth every six (6) hours as needed for Pain.      hydrALAZINE (APRESOLINE) 25 mg tablet Take 25 mg by mouth three (3) times daily. MAGNESIUM OXIDE, BULK, 200 mg by Does Not Apply route daily. DULoxetine (CYMBALTA) 30 mg capsule Take 30 mg by mouth daily. VENTOLIN HFA 90 mcg/actuation inhaler Take 2 Puffs by inhalation as needed. Refills: 1      losartan (COZAAR) 50 mg tablet Take 50 mg by mouth daily. carvedilol (COREG) 6.25 mg tablet Take 12.5 mg by mouth two (2) times daily (with meals). clopidogrel (PLAVIX) 75 mg tablet Take 75 mg by mouth daily. amLODIPine (NORVASC) 5 mg tablet Take 5 mg by mouth daily. aspirin delayed-release 81 mg tablet Take 81 mg by mouth daily. sitaGLIPtin (JANUVIA) 100 mg tablet Take 50 mg by mouth daily. levothyroxine (SYNTHROID) 50 mcg tablet Take 1 Tab by mouth Daily (before breakfast). WITH WATER ONLY  Qty: 30 Tab, Refills: 6           STOP taking these medications       lisinopriL (PRINIVIL, ZESTRIL) 5 mg tablet Comments:   Reason for Stopping:         meclizine (ANTIVERT) 25 mg tablet Comments:   Reason for Stopping:         nitroglycerin (NITROSTAT) 0.4 mg SL tablet Comments:   Reason for Stopping:         metolazone (ZAROXOLYN) 5 mg tablet Comments:   Reason for Stopping:         omeprazole (PRILOSEC) 20 mg capsule Comments:   Reason for Stopping:         furosemide (LASIX) 40 mg tablet Comments:   Reason for Stopping:         potassium chloride 20 mEq TbER Comments:   Reason for Stopping:         allopurinol (ZYLOPRIM) 100 mg tablet Comments:   Reason for Stopping: Follow up Care:    1. Milind Hancock MD in 1-2 weeks.       Follow-up Information       Follow up With Specialties Details Why Contact Info    Lilliam Bo MD Family Medicine Follow up in 1 week(s)  [address] [phone]                Patient Follow Up Instructions: Activity: PT/OT per Home Health  Diet:  Cardiac Diet  Wound Care: None needed     Condition at Discharge:  Stable  __________________________________________________________________    Disposition  Home Health Care Svc  ____________________________________________________________________    Code Status:  Full Code  ___________________________________________________________________    Discharge Exam:  Patient seen and examined by me on discharge day. Pertinent Findings:  Gen:    Not in distress  Chest: Clear lungs  CVS:   Regular rhythm. No edema  Abd:  Soft, not distended, not tender  Neuro:  Alert        CONSULTATIONS: Cardiology    Significant Diagnostic Studies:   Recent Results (from the past 24 hour(s))   CBC WITH AUTOMATED DIFF    Collection Time: 08/16/22  9:23 AM   Result Value Ref Range    WBC 4.7 3.6 - 11.0 K/uL    RBC 4.10 3.80 - 5.20 M/uL    HGB 12.2 11.5 - 16.0 g/dL    HCT 38.8 35.0 - 47.0 %    MCV 94.6 80.0 - 99.0 FL    MCH 29.8 26.0 - 34.0 PG    MCHC 31.4 30.0 - 36.5 g/dL    RDW 14.5 11.5 - 14.5 %    PLATELET 323 442 - 002 K/uL    MPV 11.3 8.9 - 12.9 FL    NRBC 0.0 0.0  WBC    ABSOLUTE NRBC 0.00 0.00 - 0.01 K/uL    NEUTROPHILS 61 32 - 75 %    LYMPHOCYTES 24 12 - 49 %    MONOCYTES 11 5 - 13 %    EOSINOPHILS 4 0 - 7 %    BASOPHILS 0 0 - 1 %    IMMATURE GRANULOCYTES 0 0 - 0.5 %    ABS. NEUTROPHILS 2.9 1.8 - 8.0 K/UL    ABS. LYMPHOCYTES 1.1 0.8 - 3.5 K/UL    ABS. MONOCYTES 0.5 0.0 - 1.0 K/UL    ABS. EOSINOPHILS 0.2 0.0 - 0.4 K/UL    ABS. BASOPHILS 0.0 0.0 - 0.1 K/UL    ABS. IMM.  GRANS. 0.0 0.00 - 0.04 K/UL    DF AUTOMATED     METABOLIC PANEL, BASIC    Collection Time: 08/16/22  9:23 AM   Result Value Ref Range    Sodium 142 136 - 145 mmol/L    Potassium 3.5 3.5 - 5.1 mmol/L    Chloride 104 97 - 108 mmol/L    CO2 33 (H) 21 - 32 mmol/L    Anion gap 5 5 - 15 mmol/L    Glucose 131 (H) 65 - 100 mg/dL    BUN 17 6 - 20 mg/dL    Creatinine 1.10 (H) 0.55 - 1.02 mg/dL    BUN/Creatinine ratio 15 12 - 20      GFR est AA 58 (L) >60 ml/min/1.73m2    GFR est non-AA 48 (L) >60 ml/min/1.73m2    Calcium 8.8 8.5 - 10.1 mg/dL     CT HEAD WO CONT   Final Result   No acute intracranial hemorrhage, mass or infarct. Nonspecific white   matter change most compatible with chronic small vessel ischemic and/or   senescent change. Intracranial atherosclerosis. XR CHEST PORT   Final Result   Findings suspicious for congestive failure with interstitial and   alveolar edema and small pleural effusions though infectious infiltrate is not   excluded.           Time spent in direct and indirect care including coordination of services: Greater than 35 minutes    Signed:  Nelly Fong NP  8/16/2022  1:07 PM

## 2022-08-16 NOTE — PROGRESS NOTES
PHYSICAL THERAPY EVALUATION  Patient: Harriet Moore (81 y.o. female)  Date: 8/16/2022  Primary Diagnosis: CHF (congestive heart failure) ([facility]) [I50.9]  Hypoxia [R09.02]  A-fib (HCC) [I48.91]  Elevated troponin [R77.8]       Precautions: falls       ASSESSMENT  Patient cleared for participation by nursing, and received semi-supine, agreeable to participation in therapy. Pt is a 66 yo F presenting to ED with cc of new onset SOB, lightheadedness while ambulating to mailbox. Admitted 8/12/22 with chest p!, elevated troponin, CHF exacerbation, atrial fibrillation. Prior level received from pt, pt lives in a 1 level home with 3 REFUGIO w/ HR. States she lives with her  and family, someone is there at all times. Reported owning a RW and a cane. Primarily ambulated without DME, and was indep with mobility, denied any recent falls. Based on the objective data described below, the patient presents with grossly decr LE strength, decr mobility, decr standing balance w/ cane, which improved w/ RW, and stand by A - CGA level of mobility. .     Mobility performed this session: Supine>sit: primarily completed with stand by A, cueing on static sitting prior to standing provided. CGA initially with sit to stand, which improved to stand by A, cueing on hand placement and static standing prior to mobilizing to prevent unsteadiness. Performed bout of functional ambulation w/ cane initially, CGA overall for performance, some unsteadiness noted however no specific LOB or buckling observed. Performed further bout w/ RW, which pt needed initial cueing to stand tall and for RW management, however overall gait assistance levels improved to stand by A. We then performed functional step up transfer via shower ledge to mimic steps, completed 4x, with cueing, and R hand support. Once completed practiced sit to stands from chair height, initially pt did not reach for hand rest and showed decr eccentric control.  Once cueing was provided on completion including LE placement being aligned with chair, we practiced again and pt was able to perform with stand by A, improved eccentric control. Once back at EOB< cueing on hand placement for stand to sit transfer, sit to supine completed with stand by A. Education provided on fall safety/fall prevention, confirmed pt has RW at home, and encouraged use for safety along with having someone close by initially at home. Pt did well with session today. Pt will benefit from continued skilled PT to address above deficits and return to OF. Current PT DC recommendation HHPT home w/ 24 hr A. Other factors to consider for discharge: current mobility, baseline mobiltiy, DME      PLAN :  Recommendations and Planned Interventions: bed mobility training, transfer training, gait training, therapeutic exercises, neuromuscular re-education, patient and family training/education, and therapeutic activities      Frequency/Duration: Patient will be followed by physical therapy:  3-5x/week to address goals. Recommendation for discharge: (in order for the patient to meet his/her long term goals)  Home with [address] [place] [address]    This discharge recommendation:  Has been made in collaboration with the attending provider and/or case management    IF patient discharges home will need the following DME: Pt reported owning RW and canr         SUBJECTIVE:   Patient agreeable to participation in therapy.     OBJECTIVE DATA SUMMARY:   HISTORY:    Past Medical History:   Diagnosis Date    Asthma     Chest discomfort     pt states off & on for few months with or without activity    CHF (congestive heart failure) ([facility])     DM (diabetes mellitus) ([facility] [address]     GERD (gastroesophageal reflux disease)     HTN (hypertension)     Thyroid disease      Past Surgical History:   Procedure Laterality Date    HX HYSTERECTOMY      HX MOHS PROCEDURES         Home Situation  Home Environment: Private residence  # Steps to Enter: 3  Rails to Enter: Yes  Hand Rails : Bilateral  One/Two Story Residence: One story  Living Alone: No  Support Systems: Spouse/Significant Other, Other Family Member(s) ([address] daughter)  Patient Expects to be Discharged to[de-identified] Home  Current DME Used/Available at Home: Jeananne Garre, rolling, Cane, straight (Confirmed pt has RW at home)  Tub or Shower Type: Tub/Shower combination    EXAMINATION/PRESENTATION/DECISION MAKING:   Critical Behavior:  Neurologic State: Alert  Orientation Level: Oriented to person, Oriented to place, Disoriented to time  Cognition: Follows commands  Safety/Judgement: Awareness of environment    Range Of Motion:  AROM: Generally decreased, functional                       Strength:    Strength: Generally decreased, functional                    Tone & Sensation:   Tone: Normal              Sensation: Intact               Coordination:  Coordination: Generally decreased, functional  Vision:      Functional Mobility:  Bed Mobility:  Rolling:  (Did not perform)  Supine to Sit: Stand-by assistance  Sit to Supine: Stand-by assistance  Scooting: Stand-by assistance  Transfers:  Sit to Stand: Contact guard assistance;Stand-by assistance  Stand to Sit: Contact guard assistance;Stand-by assistance                       Balance:   Sitting: Intact; Without support  Sitting - Static:  (Intact)  Sitting - Dynamic:  (Intact when picking item up off floor)  Standing: Impaired; Without support  Standing - Static: Good;Constant support  Standing - Dynamic : Fair;Constant support  Ambulation/Gait Training:  Distance (ft): 55 Feet (ft)  Assistive Device: Walker, rolling  Ambulation - Level of Assistance: Contact guard assistance;Stand-by assistance (Progressing to stand by A at times w/ RW)     Gait Description (WDL): Exceptions to WDL  Gait Abnormalities:  (step through, slight trunk flex, no specific buckling or LOB observed, decr amita)        Stairs:  Number of Stairs Trained: 4 (ascended/descended 1 step, 4x)  Stairs - Level of Assistance: Contact guard assistance   Rail Use: Right     Functional Measure:  [address] Mobility Inpatient Short Form  How much difficulty does the patient currently have. .. Unable A Lot A Little None   1. Turning over in bed (including adjusting bedclothes, sheets and blankets)? [] 1   [] 2   [] 3   [x] 4   2. Sitting down on and standing up from a chair with arms ( e.g., wheelchair, bedside commode, etc.)   [] 1   [] 2   [x] 3   [] 4   3. Moving from lying on back to sitting on the side of the bed? [] 1   [] 2   [] 3   [x] 4          How much help from another person does the patient currently need. .. Total A Lot A Little None   4. Moving to and from a bed to a chair (including a wheelchair)? [] 1   [] 2   [x] 3   [] 4   5. Need to walk in hospital room? [] 1   [] 2   [x] 3   [] 4   6. Climbing 3-5 steps with a railing? [] 1   [] 2   [x] 3   [] 4   © 2007, Trustees of [employer], under license to [employer]. All rights reserved     Score:  Initial: 20/24 Most Recent: (Date: 8/16/22 )   Interpretation of Tool:  Represents activities that are increasingly more difficult (i.e. Bed mobility, Transfers, Gait).   Score 24 23 22-20 19-15 14-10 9-7 6   Modifier CH CI CJ CK CL CM CN         Physical Therapy Evaluation Charge Determination   History Examination Presentation Decision-Making   MEDIUM  Complexity : 1-2 comorbidities / personal factors will impact the outcome/ POC  MEDIUM Complexity : 3 Standardized tests and measures addressing body structure, function, activity limitation and / or participation in recreation  LOW Complexity : Stable, uncomplicated  Other outcome measures [facility] 6  20/24      Based on the above components, the patient evaluation is determined to be of the following complexity level: LOW     Pain Rating:  No pain reported    Activity Tolerance:   Good    After treatment patient left in no apparent distress:   Bed returned to lowest position, Supine in bed and Call bell within reach and RN updated. GOALS:    Problem: Mobility Impaired (Adult and Pediatric)  Goal: *Acute Goals and Plan of Care (Insert Text)  Description:   Pt will be I with LE HEP in 7 days. Pt will perform bed mobility with mod I in 7 days. Pt will perform transfers with mod I in 7 days. Pt will amb >100 feet with LRAD safely with mod I in 7 days. Pt will ascend/descend 3 steps with B handrails and stand by A in 7 days    Outcome: Not Met       COMMUNICATION/EDUCATION:   The patients plan of care was discussed with: Registered nurse and Case management. Fall prevention education was provided and the patient/caregiver indicated understanding. and Patient/family have participated as able in goal setting and plan of care.        Thank you for this referral.  Sherrine Claude, PT, PT, DPT   Time Calculation: 27 mins

## 2022-08-17 ENCOUNTER — PATIENT OUTREACH (OUTPATIENT)
Dept: CASE MANAGEMENT | Age: 77
End: 2022-08-17

## 2022-08-17 NOTE — PROGRESS NOTES
Care Transitions Initial Call    Call within 2 business days of discharge: Yes     Patient: Josiah Murillo Patient : 1945 MRN: [id number]    Last Discharge  Street       Date Complaint Diagnosis Description Type Department Provider    22 Dizziness; Shortness of Breath SOB (shortness of breath) . .. ED to Hosp-Admission (Discharged) (ADMIT) [id number] Olesya Kimball MD; Anai Santa . .. Was this an external facility discharge? No     Challenges to be reviewed by the provider   Additional needs identified to be addressed with provider:        Method of communication with provider : none    Discussed COVID-19 related testing which was not done at this time. Advance Care Planning:   Does patient have an Advance Directive: not on file. Inpatient Readmission Risk score: Unplanned Readmit Risk Score: 8.8    Was this a readmission? no   Patient stated reason for the admission: sob    Patients top risk factors for readmission: medical condition-chf    Interventions to address risk factors: Scheduled appointment with PCP-, Obtained and reviewed discharge summary and/or continuity of care documents, and Education of patient/family/caregiver/guardian to support self-management-daily weights    Care Transition Nurse (CTN) contacted the patient by telephone to perform post hospital discharge assessment. Verified name and  with patient as identifiers. Provided introduction to self, and explanation of the CTN role. CTN reviewed discharge instructions, medical action plan and red flags with patient who verbalized understanding. Were discharge instructions available to patient? yes. Reviewed appropriate site of care based on symptoms and resources available to patient including: PCP and Home Health. Patient given an opportunity to ask questions and does not have any further questions or concerns at this time.  The patient agrees to contact the PCP office for questions related to their healthcare. Medication reconciliation was performed with patient, who verbalizes understanding of administration of home medications. Referral to Pharm D needed: no     Home Health/Outpatient orders at discharge: home health care and [address] [place] [address] [facility]  Date of initial visit: waiting for return call from Intake, [address] ordered at discharge: None    Was patient discharged with a pulse oximeter? no    Discussed follow-up appointments. If no appointment was previously scheduled, appointment scheduling offered: no. Is follow up appointment scheduled within 7 days of discharge? no.   [facility] No future appointments. Non-[facility] follow up appointment(s): 8/24 Dr. Jaylene Aguilera for follow-up call in 5-7 days based on severity of symptoms and risk factors. Plan for next call: self management-low sodium diet  CTN provided contact information for future needs. Goals Addressed                   This Visit's Progress     Prevent complications post hospitalization.         08/17/22  Absence of falls  Will follow low sodium diet  Will purchase scale and weigh daily with tracking  Will attend follow up appt with PCP scheduled 8/24  Pt will remain out of the hospital or ER for remainder of SKYLAR period

## 2022-08-18 NOTE — PROGRESS NOTES
Incoming call received from [place], [place] [facility]  Confirmed patient's ID.   Confirmed SOC is scheduled for today (8/18/2022)

## 2022-08-25 ENCOUNTER — PATIENT OUTREACH (OUTPATIENT)
Dept: CASE MANAGEMENT | Age: 77
End: 2022-08-25

## 2022-08-25 NOTE — PROGRESS NOTES
Care Transitions Follow Up Call    Care Transition Nurse (CTN) contacted the patient by telephone to follow up after admission on 8/12/2022. Addressed changes since last contact:  reviewed the importance of daily weights  Follow up appointment completed? no.   Was follow up appointment scheduled within 7 days of discharge? no.     CTN reviewed medical action plan and red flags with patient and discussed any barriers to care and/or understanding of plan of care after discharge. Discussed appropriate site of care based on symptoms and resources available to patient including: PCP. Patients top risk factors for readmission: medical condition-chf and medication management   Interventions to address risk factors: Assessment and support for treatment adherence and medication management-compliance with all medications    [facility] follow up appointment(s): No future appointments. Non-[facility] follow up appointment(s): [facility]    CTN provided contact information for future needs. Plan for follow-up call in 5-7 days based on severity of symptoms and risk factors. Plan for next call: follow up appointment-pcp and medication management-compliance       Goals Addressed                   This Visit's Progress     Prevent complications post hospitalization.         08/17/22  Absence of falls  Will follow low sodium diet  Will purchase scale and weigh daily with tracking  Will attend follow up appt with PCP scheduled 8/24  Pt will remain out of the hospital or ER for remainder of SKYLAR period    08/25/22  Denies falls  Following low sodium diet  Will report compliance with daily weight and tracking

## 2022-09-01 ENCOUNTER — PATIENT OUTREACH (OUTPATIENT)
Dept: CASE MANAGEMENT | Age: 77
End: 2022-09-01

## 2022-09-13 PROBLEM — R77.8 ELEVATED TROPONIN: Status: RESOLVED | Noted: 2022-08-14 | Resolved: 2022-09-13

## 2022-09-13 PROBLEM — R79.89 ELEVATED TROPONIN: Status: RESOLVED | Noted: 2022-08-14 | Resolved: 2022-09-13

## 2022-09-16 ENCOUNTER — PATIENT OUTREACH (OUTPATIENT)
Dept: CASE MANAGEMENT | Age: 77
End: 2022-09-16

## 2022-09-16 NOTE — PROGRESS NOTES
Patient has graduated from the Transitions of Care Coordination  program on 9/16/2022. Patient/family has the ability to self-manage at this time Care management goals have been completed. Patient was not referred to the [facility] team for further management. Goals Addressed                   This Visit's Progress     COMPLETED: Prevent complications post hospitalization. 08/17/22  Absence of falls  Will follow low sodium diet  Will purchase scale and weigh daily with tracking  Will attend follow up appt with PCP scheduled 8/24  Pt will remain out of the hospital or ER for remainder of SKYLAR period    08/25/22  Denies falls  Following low sodium diet  Will report compliance with daily weight and tracking              Patient has Care Transition Nurse's contact information for any further questions, concerns, or needs. Patients upcoming visits:  No future appointments.

## 2023-12-16 ENCOUNTER — APPOINTMENT (OUTPATIENT)
Facility: HOSPITAL | Age: 78
End: 2023-12-16
Payer: MEDICARE

## 2023-12-16 ENCOUNTER — HOSPITAL ENCOUNTER (EMERGENCY)
Facility: HOSPITAL | Age: 78
Discharge: HOME | End: 2023-12-17
Attending: EMERGENCY MEDICINE
Payer: MEDICARE

## 2023-12-16 DIAGNOSIS — R10.9 ACUTE LEFT FLANK PAIN: ICD-10-CM

## 2023-12-16 DIAGNOSIS — M54.50 ACUTE MIDLINE LOW BACK PAIN WITHOUT SCIATICA: Primary | ICD-10-CM

## 2023-12-16 DIAGNOSIS — J18.9 COMMUNITY ACQUIRED PNEUMONIA, UNSPECIFIED LATERALITY: ICD-10-CM

## 2023-12-16 LAB
ANION GAP SERPL CALC-SCNC: 6 MMOL/L (ref 5–15)
BASOPHILS # BLD: 0 K/UL (ref 0–0.1)
BASOPHILS NFR BLD: 0 % (ref 0–1)
BUN SERPL-MCNC: 10 MG/DL (ref 6–20)
BUN/CREAT SERPL: 9 (ref 12–20)
CA-I BLD-MCNC: 9.1 MG/DL (ref 8.5–10.1)
CHLORIDE SERPL-SCNC: 104 MMOL/L (ref 97–108)
CO2 SERPL-SCNC: 31 MMOL/L (ref 21–32)
CREAT SERPL-MCNC: 1.07 MG/DL (ref 0.55–1.02)
DIFFERENTIAL METHOD BLD: ABNORMAL
EOSINOPHIL # BLD: 0.1 K/UL (ref 0–0.4)
EOSINOPHIL NFR BLD: 1 % (ref 0–7)
ERYTHROCYTE [DISTWIDTH] IN BLOOD BY AUTOMATED COUNT: 12.7 % (ref 11.5–14.5)
GLUCOSE SERPL-MCNC: 122 MG/DL (ref 65–100)
HCT VFR BLD AUTO: 37.9 % (ref 35–47)
HGB BLD-MCNC: 12.4 G/DL (ref 11.5–16)
IMM GRANULOCYTES # BLD AUTO: 0 K/UL (ref 0–0.04)
IMM GRANULOCYTES NFR BLD AUTO: 0 % (ref 0–0.5)
LIPASE SERPL-CCNC: 17 U/L (ref 13–75)
LYMPHOCYTES # BLD: 1.6 K/UL (ref 0.8–3.5)
LYMPHOCYTES NFR BLD: 17 % (ref 12–49)
MCH RBC QN AUTO: 31.1 PG (ref 26–34)
MCHC RBC AUTO-ENTMCNC: 32.7 G/DL (ref 30–36.5)
MCV RBC AUTO: 95 FL (ref 80–99)
MONOCYTES # BLD: 1.2 K/UL (ref 0–1)
MONOCYTES NFR BLD: 13 % (ref 5–13)
NEUTS SEG # BLD: 6.4 K/UL (ref 1.8–8)
NEUTS SEG NFR BLD: 69 % (ref 32–75)
NRBC # BLD: 0 K/UL (ref 0–0.01)
NRBC BLD-RTO: 0 PER 100 WBC
PLATELET # BLD AUTO: 205 K/UL (ref 150–400)
PMV BLD AUTO: 9.2 FL (ref 8.9–12.9)
POTASSIUM SERPL-SCNC: 3.6 MMOL/L (ref 3.5–5.1)
RBC # BLD AUTO: 3.99 M/UL (ref 3.8–5.2)
SODIUM SERPL-SCNC: 141 MMOL/L (ref 136–145)
WBC # BLD AUTO: 9.3 K/UL (ref 3.6–11)

## 2023-12-16 PROCEDURE — 83690 ASSAY OF LIPASE: CPT

## 2023-12-16 PROCEDURE — 85025 COMPLETE CBC W/AUTO DIFF WBC: CPT

## 2023-12-16 PROCEDURE — 6360000002 HC RX W HCPCS: Performed by: EMERGENCY MEDICINE

## 2023-12-16 PROCEDURE — 94761 N-INVAS EAR/PLS OXIMETRY MLT: CPT

## 2023-12-16 PROCEDURE — 80048 BASIC METABOLIC PNL TOTAL CA: CPT

## 2023-12-16 PROCEDURE — 36415 COLL VENOUS BLD VENIPUNCTURE: CPT

## 2023-12-16 PROCEDURE — 2580000003 HC RX 258: Performed by: EMERGENCY MEDICINE

## 2023-12-16 PROCEDURE — 74176 CT ABD & PELVIS W/O CONTRAST: CPT

## 2023-12-16 RX ORDER — TRAMADOL HYDROCHLORIDE 50 MG/1
50 TABLET ORAL EVERY 6 HOURS PRN
Qty: 12 TABLET | Refills: 0 | Status: SHIPPED | OUTPATIENT
Start: 2023-12-16 | End: 2023-12-19

## 2023-12-16 RX ORDER — MORPHINE SULFATE 4 MG/ML
4 INJECTION, SOLUTION INTRAMUSCULAR; INTRAVENOUS
Status: COMPLETED | OUTPATIENT
Start: 2023-12-16 | End: 2023-12-16

## 2023-12-16 RX ORDER — AZITHROMYCIN 500 MG/1
500 TABLET, FILM COATED ORAL DAILY
Qty: 5 TABLET | Refills: 0 | Status: SHIPPED | OUTPATIENT
Start: 2023-12-17 | End: 2023-12-22

## 2023-12-16 RX ADMIN — AZITHROMYCIN MONOHYDRATE 500 MG: 500 INJECTION, POWDER, LYOPHILIZED, FOR SOLUTION INTRAVENOUS at 22:13

## 2023-12-16 RX ADMIN — MORPHINE SULFATE 4 MG: 4 INJECTION, SOLUTION INTRAMUSCULAR; INTRAVENOUS at 19:58

## 2023-12-16 ASSESSMENT — PAIN - FUNCTIONAL ASSESSMENT: PAIN_FUNCTIONAL_ASSESSMENT: 0-10

## 2023-12-16 ASSESSMENT — PAIN SCALES - GENERAL: PAINLEVEL_OUTOF10: 8

## 2023-12-16 ASSESSMENT — LIFESTYLE VARIABLES
HOW OFTEN DO YOU HAVE A DRINK CONTAINING ALCOHOL: NEVER
HOW MANY STANDARD DRINKS CONTAINING ALCOHOL DO YOU HAVE ON A TYPICAL DAY: PATIENT DOES NOT DRINK

## 2023-12-17 VITALS
OXYGEN SATURATION: 98 % | SYSTOLIC BLOOD PRESSURE: 129 MMHG | TEMPERATURE: 99.5 F | HEART RATE: 89 BPM | RESPIRATION RATE: 23 BRPM | BODY MASS INDEX: 22.08 KG/M2 | WEIGHT: 120 LBS | DIASTOLIC BLOOD PRESSURE: 59 MMHG | HEIGHT: 62 IN

## 2023-12-17 NOTE — DISCHARGE INSTRUCTIONS
Thank you! Thank you for allowing me to care for you in the emergency department. It is my goal to provide you with excellent care. If you have not received excellent quality care, please ask to speak to the nurse manager. Please fill out the survey that will come to you by mail or email since we listen to your feedback! Below you will find a list of your tests from today's visit. Should you have any questions, please do not hesitate to call the emergency department.     Labs  Recent Results (from the past 12 hour(s))   CBC with Auto Differential    Collection Time: 12/16/23  8:00 PM   Result Value Ref Range    WBC 9.3 3.6 - 11.0 K/uL    RBC 3.99 3.80 - 5.20 M/uL    Hemoglobin 12.4 11.5 - 16.0 g/dL    Hematocrit 37.9 35.0 - 47.0 %    MCV 95.0 80.0 - 99.0 FL    MCH 31.1 26.0 - 34.0 PG    MCHC 32.7 30.0 - 36.5 g/dL    RDW 12.7 11.5 - 14.5 %    Platelets 597 003 - 829 K/uL    MPV 9.2 8.9 - 12.9 FL    Nucleated RBCs 0.0 0.0  WBC    nRBC 0.00 0.00 - 0.01 K/uL    Neutrophils % 69 32 - 75 %    Lymphocytes % 17 12 - 49 %    Monocytes % 13 5 - 13 %    Eosinophils % 1 0 - 7 %    Basophils % 0 0 - 1 %    Immature Granulocytes 0 0 - 0.5 %    Neutrophils Absolute 6.4 1.8 - 8.0 K/UL    Lymphocytes Absolute 1.6 0.8 - 3.5 K/UL    Monocytes Absolute 1.2 (H) 0.0 - 1.0 K/UL    Eosinophils Absolute 0.1 0.0 - 0.4 K/UL    Basophils Absolute 0.0 0.0 - 0.1 K/UL    Absolute Immature Granulocyte 0.0 0.00 - 0.04 K/UL    Differential Type AUTOMATED     BMP    Collection Time: 12/16/23  8:00 PM   Result Value Ref Range    Sodium 141 136 - 145 mmol/L    Potassium 3.6 3.5 - 5.1 mmol/L    Chloride 104 97 - 108 mmol/L    CO2 31 21 - 32 mmol/L    Anion Gap 6 5 - 15 mmol/L    Glucose 122 (H) 65 - 100 mg/dL    BUN 10 6 - 20 mg/dL    Creatinine 1.07 (H) 0.55 - 1.02 mg/dL    Bun/Cre Ratio 9 (L) 12 - 20      Est, Glom Filt Rate 53 (L) >60 ml/min/1.73m2    Calcium 9.1 8.5 - 10.1 mg/dL   Lipase    Collection Time: 12/16/23  8:00 PM

## 2023-12-17 NOTE — ED NOTES
Discharge instructions reviewed with patient. IV removed without complications. Patient is alert and oriented at discharge and in no acute distress.        Ulises Vilchis RN  12/17/23 2411

## 2023-12-17 NOTE — ED NOTES
Patient states she is unable to urinate, bladder scanner shows 0mLs.      Roseline Palacios RN  12/16/23 [id number]

## 2023-12-17 NOTE — ED PROVIDER NOTES
F [phone]  [address], [address] [facility] [address]      Phone: [phone]   azithromycin 500 MG tablet  traMADol 50 MG tablet           DISCONTINUED MEDICATIONS:  Current Discharge Medication List          I am the Primary Clinician of Record. Luis Falcon MD (electronically signed)    (Please note that parts of this dictation were completed with voice recognition software. Quite often unanticipated grammatical, syntax, homophones, and other interpretive errors are inadvertently transcribed by the computer software. Please disregards these errors.  Please excuse any errors that have escaped final proofreading.)     Luis Falcon MD  12/16/23 [phone]

## 2024-03-12 ENCOUNTER — OFFICE VISIT (OUTPATIENT)
Age: 79
End: 2024-03-12
Payer: MEDICARE

## 2024-03-12 VITALS
SYSTOLIC BLOOD PRESSURE: 119 MMHG | DIASTOLIC BLOOD PRESSURE: 79 MMHG | BODY MASS INDEX: 22.08 KG/M2 | TEMPERATURE: 97.7 F | HEIGHT: 62 IN | WEIGHT: 120 LBS | OXYGEN SATURATION: 98 % | HEART RATE: 76 BPM

## 2024-03-12 DIAGNOSIS — L60.0 INGROWING NAIL: Primary | ICD-10-CM

## 2024-03-12 DIAGNOSIS — B35.1 ONYCHOMYCOSIS: ICD-10-CM

## 2024-03-12 PROCEDURE — 99203 OFFICE O/P NEW LOW 30 MIN: CPT | Performed by: PODIATRIST

## 2024-03-12 PROCEDURE — G8420 CALC BMI NORM PARAMETERS: HCPCS | Performed by: PODIATRIST

## 2024-03-12 PROCEDURE — G8427 DOCREV CUR MEDS BY ELIG CLIN: HCPCS | Performed by: PODIATRIST

## 2024-03-12 PROCEDURE — G8484 FLU IMMUNIZE NO ADMIN: HCPCS | Performed by: PODIATRIST

## 2024-03-12 PROCEDURE — 1123F ACP DISCUSS/DSCN MKR DOCD: CPT | Performed by: PODIATRIST

## 2024-03-12 PROCEDURE — 1090F PRES/ABSN URINE INCON ASSESS: CPT | Performed by: PODIATRIST

## 2024-03-12 PROCEDURE — G8400 PT W/DXA NO RESULTS DOC: HCPCS | Performed by: PODIATRIST

## 2024-03-12 PROCEDURE — 1036F TOBACCO NON-USER: CPT | Performed by: PODIATRIST

## 2024-03-12 NOTE — PROGRESS NOTES
Chief Complaint   Patient presents with    New Patient    Nail Problem     /79 (Site: Right Upper Arm, Position: Sitting, Cuff Size: Medium Adult)   Pulse 76   Temp 97.7 °F (36.5 °C) (Temporal)   Ht 1.575 m (5' 2\")   Wt 54.4 kg (120 lb)   SpO2 98%   BMI 21.95 kg/m²     1. Have you been to the ER, urgent care clinic since your last visit?  Hospitalized since your last visit?No    2. Have you seen or consulted any other health care providers outside of the [facility] since your last visit?  Include any pap smears or colon screening. No

## 2024-03-27 ASSESSMENT — ENCOUNTER SYMPTOMS
VOMITING: 0
SHORTNESS OF BREATH: 0
ABDOMINAL PAIN: 0
DIARRHEA: 0

## 2024-03-27 NOTE — PROGRESS NOTES
[facility]         Patient Name: Kimber Morris    : 1945    Visit Date: 3/12/2024    Office Visit Note      Subjective:         Patient is a 78 y.o. female who is being seen in office initial visit for pain to the bilateral toenails.  Patient has been unable to cut his own toenails in over a year.  Patient states the nail is curling and toes.    Past Medical History:   Diagnosis Date    Asthma     Chest discomfort     pt states off & on for few months with or without activity    CHF (congestive heart failure) (HCC)     DM (diabetes mellitus) (HCC)     GERD (gastroesophageal reflux disease)     HTN (hypertension)     Thyroid disease      Past Surgical History:   Procedure Laterality Date    HYSTERECTOMY (CERVIX STATUS UNKNOWN)      MOHS SURGERY         Family History   Problem Relation Age of Onset    Hypertension Mother     Diabetes Mother     Heart Disease Mother       Social History     Tobacco Use    Smoking status: Never    Smokeless tobacco: Never   Substance Use Topics    Alcohol use: No     No Known Allergies  Prior to Admission medications    Medication Sig Start Date End Date Taking? Authorizing Provider   albuterol sulfate HFA (VENTOLIN HFA) 108 (90 Base) MCG/ACT inhaler Inhale 2 puffs into the lungs as needed 16  Yes Automatic Reconciliation, Ar   amLODIPine (NORVASC) 5 MG tablet Take 1 tablet by mouth daily   Yes Automatic Reconciliation, Ar   aspirin 81 MG EC tablet Take 1 tablet by mouth daily   Yes Automatic Reconciliation, Ar   atorvastatin (LIPITOR) 40 MG tablet Take 1 tablet by mouth nightly 22  Yes Automatic Reconciliation, Ar   carvedilol (COREG) 6.25 MG tablet Take 2 tablets by mouth 2 times daily (with meals)   Yes Automatic Reconciliation, Ar   clopidogrel (PLAVIX) 75 MG tablet Take 1 tablet by mouth daily   Yes Automatic Reconciliation, Ar   DULoxetine (CYMBALTA) 30 MG extended release capsule Take 1 capsule by mouth daily   Yes Automatic

## 2024-05-10 ENCOUNTER — APPOINTMENT (OUTPATIENT)
Facility: HOSPITAL | Age: 79
DRG: 309 | End: 2024-05-10
Attending: INTERNAL MEDICINE
Payer: MEDICARE

## 2024-05-10 ENCOUNTER — APPOINTMENT (OUTPATIENT)
Facility: HOSPITAL | Age: 79
DRG: 309 | End: 2024-05-10
Payer: MEDICARE

## 2024-05-10 ENCOUNTER — HOSPITAL ENCOUNTER (INPATIENT)
Facility: HOSPITAL | Age: 79
LOS: 5 days | Discharge: HOME HEALTH | DRG: 309 | End: 2024-05-15
Attending: EMERGENCY MEDICINE | Admitting: INTERNAL MEDICINE
Payer: MEDICARE

## 2024-05-10 DIAGNOSIS — I48.91 ATRIAL FIBRILLATION, UNSPECIFIED TYPE (HCC): Primary | ICD-10-CM

## 2024-05-10 DIAGNOSIS — R07.9 CHEST PAIN, UNSPECIFIED TYPE: ICD-10-CM

## 2024-05-10 LAB
ANION GAP SERPL CALC-SCNC: 7 MMOL/L (ref 5–15)
APTT PPP: 32.4 SEC (ref 21.2–34.1)
BASOPHILS # BLD: 0 K/UL (ref 0–0.1)
BASOPHILS NFR BLD: 0 % (ref 0–1)
BNP SERPL-MCNC: 999 PG/ML
BUN SERPL-MCNC: 18 MG/DL (ref 6–20)
BUN/CREAT SERPL: 15 (ref 12–20)
CA-I BLD-MCNC: 10.1 MG/DL (ref 8.5–10.1)
CHLORIDE SERPL-SCNC: 108 MMOL/L (ref 97–108)
CO2 SERPL-SCNC: 27 MMOL/L (ref 21–32)
CREAT SERPL-MCNC: 1.2 MG/DL (ref 0.55–1.02)
D DIMER PPP FEU-MCNC: 1.31 UG/ML(FEU)
DIFFERENTIAL METHOD BLD: NORMAL
EKG ATRIAL RATE: 81 BPM
EKG DIAGNOSIS: NORMAL
EKG P AXIS: 78 DEGREES
EKG P-R INTERVAL: 208 MS
EKG Q-T INTERVAL: 398 MS
EKG QRS DURATION: 74 MS
EKG QTC CALCULATION (BAZETT): 462 MS
EKG R AXIS: 11 DEGREES
EKG T AXIS: 7 DEGREES
EKG VENTRICULAR RATE: 81 BPM
EOSINOPHIL # BLD: 0.3 K/UL (ref 0–0.4)
EOSINOPHIL NFR BLD: 3 % (ref 0–7)
ERYTHROCYTE [DISTWIDTH] IN BLOOD BY AUTOMATED COUNT: 13 % (ref 11.5–14.5)
ERYTHROCYTE [DISTWIDTH] IN BLOOD BY AUTOMATED COUNT: 13.1 % (ref 11.5–14.5)
GLUCOSE BLD STRIP.AUTO-MCNC: 168 MG/DL (ref 65–100)
GLUCOSE SERPL-MCNC: 138 MG/DL (ref 65–100)
HCT VFR BLD AUTO: 40.9 % (ref 35–47)
HCT VFR BLD AUTO: 49.8 % (ref 35–47)
HGB BLD-MCNC: 13.7 G/DL (ref 11.5–16)
HGB BLD-MCNC: 16.5 G/DL (ref 11.5–16)
IMM GRANULOCYTES # BLD AUTO: 0 K/UL (ref 0–0.04)
IMM GRANULOCYTES NFR BLD AUTO: 0 % (ref 0–0.5)
INR PPP: 0.9 (ref 0.9–1.1)
LYMPHOCYTES # BLD: 2.5 K/UL (ref 0.8–3.5)
LYMPHOCYTES NFR BLD: 32 % (ref 12–49)
MAGNESIUM SERPL-MCNC: NORMAL MG/DL (ref 1.6–2.4)
MAGNESIUM SERPL-MCNC: NORMAL MG/DL (ref 1.6–2.4)
MCH RBC QN AUTO: 31 PG (ref 26–34)
MCH RBC QN AUTO: 31.6 PG (ref 26–34)
MCHC RBC AUTO-ENTMCNC: 33.1 G/DL (ref 30–36.5)
MCHC RBC AUTO-ENTMCNC: 33.5 G/DL (ref 30–36.5)
MCV RBC AUTO: 93.6 FL (ref 80–99)
MCV RBC AUTO: 94.2 FL (ref 80–99)
MONOCYTES # BLD: 0.6 K/UL (ref 0–1)
MONOCYTES NFR BLD: 7 % (ref 5–13)
NEUTS SEG # BLD: 4.4 K/UL (ref 1.8–8)
NEUTS SEG NFR BLD: 58 % (ref 32–75)
NRBC # BLD: 0 K/UL (ref 0–0.01)
NRBC # BLD: 0 K/UL (ref 0–0.01)
NRBC BLD-RTO: 0 PER 100 WBC
NRBC BLD-RTO: 0 PER 100 WBC
PERFORMED BY:: ABNORMAL
PLATELET # BLD AUTO: 210 K/UL (ref 150–400)
PLATELET # BLD AUTO: 258 K/UL (ref 150–400)
PMV BLD AUTO: 10.4 FL (ref 8.9–12.9)
PMV BLD AUTO: 9.2 FL (ref 8.9–12.9)
POTASSIUM SERPL-SCNC: 3.6 MMOL/L (ref 3.5–5.1)
POTASSIUM SERPL-SCNC: ABNORMAL MMOL/L (ref 3.5–5.1)
POTASSIUM SERPL-SCNC: NORMAL MMOL/L (ref 3.5–5.1)
PROTHROMBIN TIME: 12.8 SEC (ref 11.9–14.6)
RBC # BLD AUTO: 4.34 M/UL (ref 3.8–5.2)
RBC # BLD AUTO: 5.32 M/UL (ref 3.8–5.2)
SODIUM SERPL-SCNC: 142 MMOL/L (ref 136–145)
THERAPEUTIC RANGE: NORMAL SEC (ref 82–109)
TROPONIN I SERPL HS-MCNC: 10 NG/L (ref 0–51)
TROPONIN I SERPL HS-MCNC: 12 NG/L (ref 0–51)
TROPONIN I SERPL HS-MCNC: 22 NG/L (ref 0–51)
UFH PPP CHRO-ACNC: <0.1 IU/ML
WBC # BLD AUTO: 7.7 K/UL (ref 3.6–11)
WBC # BLD AUTO: 9.1 K/UL (ref 3.6–11)

## 2024-05-10 PROCEDURE — 2000000000 HC ICU R&B

## 2024-05-10 PROCEDURE — 82962 GLUCOSE BLOOD TEST: CPT

## 2024-05-10 PROCEDURE — 2500000003 HC RX 250 WO HCPCS: Performed by: EMERGENCY MEDICINE

## 2024-05-10 PROCEDURE — 2580000003 HC RX 258: Performed by: INTERNAL MEDICINE

## 2024-05-10 PROCEDURE — 85520 HEPARIN ASSAY: CPT

## 2024-05-10 PROCEDURE — 93005 ELECTROCARDIOGRAM TRACING: CPT | Performed by: EMERGENCY MEDICINE

## 2024-05-10 PROCEDURE — 85027 COMPLETE CBC AUTOMATED: CPT

## 2024-05-10 PROCEDURE — 71275 CT ANGIOGRAPHY CHEST: CPT

## 2024-05-10 PROCEDURE — 93306 TTE W/DOPPLER COMPLETE: CPT

## 2024-05-10 PROCEDURE — 2580000003 HC RX 258: Performed by: EMERGENCY MEDICINE

## 2024-05-10 PROCEDURE — 80048 BASIC METABOLIC PNL TOTAL CA: CPT

## 2024-05-10 PROCEDURE — 83880 ASSAY OF NATRIURETIC PEPTIDE: CPT

## 2024-05-10 PROCEDURE — 84443 ASSAY THYROID STIM HORMONE: CPT

## 2024-05-10 PROCEDURE — 85025 COMPLETE CBC W/AUTO DIFF WBC: CPT

## 2024-05-10 PROCEDURE — 84132 ASSAY OF SERUM POTASSIUM: CPT

## 2024-05-10 PROCEDURE — 71045 X-RAY EXAM CHEST 1 VIEW: CPT

## 2024-05-10 PROCEDURE — 85730 THROMBOPLASTIN TIME PARTIAL: CPT

## 2024-05-10 PROCEDURE — 6370000000 HC RX 637 (ALT 250 FOR IP): Performed by: INTERNAL MEDICINE

## 2024-05-10 PROCEDURE — 74176 CT ABD & PELVIS W/O CONTRAST: CPT

## 2024-05-10 PROCEDURE — 85610 PROTHROMBIN TIME: CPT

## 2024-05-10 PROCEDURE — 96374 THER/PROPH/DIAG INJ IV PUSH: CPT

## 2024-05-10 PROCEDURE — 84484 ASSAY OF TROPONIN QUANT: CPT

## 2024-05-10 PROCEDURE — 99285 EMERGENCY DEPT VISIT HI MDM: CPT

## 2024-05-10 PROCEDURE — 94761 N-INVAS EAR/PLS OXIMETRY MLT: CPT

## 2024-05-10 PROCEDURE — 83735 ASSAY OF MAGNESIUM: CPT

## 2024-05-10 PROCEDURE — 85379 FIBRIN DEGRADATION QUANT: CPT

## 2024-05-10 PROCEDURE — 36415 COLL VENOUS BLD VENIPUNCTURE: CPT

## 2024-05-10 PROCEDURE — 84439 ASSAY OF FREE THYROXINE: CPT

## 2024-05-10 PROCEDURE — 72072 X-RAY EXAM THORAC SPINE 3VWS: CPT

## 2024-05-10 PROCEDURE — 6360000004 HC RX CONTRAST MEDICATION: Performed by: EMERGENCY MEDICINE

## 2024-05-10 RX ORDER — HEPARIN SODIUM 1000 [USP'U]/ML
60 INJECTION, SOLUTION INTRAVENOUS; SUBCUTANEOUS PRN
Status: DISCONTINUED | OUTPATIENT
Start: 2024-05-10 | End: 2024-05-10

## 2024-05-10 RX ORDER — ONDANSETRON 4 MG/1
4 TABLET, ORALLY DISINTEGRATING ORAL EVERY 8 HOURS PRN
Status: DISCONTINUED | OUTPATIENT
Start: 2024-05-10 | End: 2024-05-15 | Stop reason: HOSPADM

## 2024-05-10 RX ORDER — POLYETHYLENE GLYCOL 3350 17 G/17G
17 POWDER, FOR SOLUTION ORAL DAILY PRN
Status: DISCONTINUED | OUTPATIENT
Start: 2024-05-10 | End: 2024-05-15 | Stop reason: HOSPADM

## 2024-05-10 RX ORDER — HEPARIN SODIUM 1000 [USP'U]/ML
30 INJECTION, SOLUTION INTRAVENOUS; SUBCUTANEOUS PRN
Status: DISCONTINUED | OUTPATIENT
Start: 2024-05-10 | End: 2024-05-10

## 2024-05-10 RX ORDER — HEPARIN SODIUM 10000 [USP'U]/100ML
5-30 INJECTION, SOLUTION INTRAVENOUS CONTINUOUS
Status: DISCONTINUED | OUTPATIENT
Start: 2024-05-10 | End: 2024-05-10

## 2024-05-10 RX ORDER — ACETAMINOPHEN 325 MG/1
650 TABLET ORAL EVERY 6 HOURS PRN
Status: DISCONTINUED | OUTPATIENT
Start: 2024-05-10 | End: 2024-05-15 | Stop reason: HOSPADM

## 2024-05-10 RX ORDER — HEPARIN SODIUM 1000 [USP'U]/ML
60 INJECTION, SOLUTION INTRAVENOUS; SUBCUTANEOUS ONCE
Status: DISCONTINUED | OUTPATIENT
Start: 2024-05-10 | End: 2024-05-10

## 2024-05-10 RX ORDER — METOPROLOL SUCCINATE 25 MG/1
25 TABLET, EXTENDED RELEASE ORAL DAILY
Status: DISCONTINUED | OUTPATIENT
Start: 2024-05-10 | End: 2024-05-15 | Stop reason: HOSPADM

## 2024-05-10 RX ORDER — SODIUM CHLORIDE 0.9 % (FLUSH) 0.9 %
5-40 SYRINGE (ML) INJECTION EVERY 12 HOURS SCHEDULED
Status: DISCONTINUED | OUTPATIENT
Start: 2024-05-10 | End: 2024-05-15 | Stop reason: HOSPADM

## 2024-05-10 RX ORDER — ENOXAPARIN SODIUM 100 MG/ML
40 INJECTION SUBCUTANEOUS DAILY
Status: DISCONTINUED | OUTPATIENT
Start: 2024-05-11 | End: 2024-05-13

## 2024-05-10 RX ORDER — ENOXAPARIN SODIUM 100 MG/ML
40 INJECTION SUBCUTANEOUS DAILY
Status: DISCONTINUED | OUTPATIENT
Start: 2024-05-10 | End: 2024-05-10

## 2024-05-10 RX ORDER — DILTIAZEM HYDROCHLORIDE 5 MG/ML
10 INJECTION INTRAVENOUS ONCE
Status: COMPLETED | OUTPATIENT
Start: 2024-05-10 | End: 2024-05-10

## 2024-05-10 RX ORDER — SODIUM CHLORIDE 0.9 % (FLUSH) 0.9 %
5-40 SYRINGE (ML) INJECTION PRN
Status: DISCONTINUED | OUTPATIENT
Start: 2024-05-10 | End: 2024-05-15 | Stop reason: HOSPADM

## 2024-05-10 RX ORDER — ROSUVASTATIN CALCIUM 20 MG/1
20 TABLET, COATED ORAL NIGHTLY
Status: DISCONTINUED | OUTPATIENT
Start: 2024-05-10 | End: 2024-05-15 | Stop reason: HOSPADM

## 2024-05-10 RX ORDER — ONDANSETRON 2 MG/ML
4 INJECTION INTRAMUSCULAR; INTRAVENOUS EVERY 6 HOURS PRN
Status: DISCONTINUED | OUTPATIENT
Start: 2024-05-10 | End: 2024-05-15 | Stop reason: HOSPADM

## 2024-05-10 RX ORDER — ACETAMINOPHEN 650 MG/1
650 SUPPOSITORY RECTAL EVERY 6 HOURS PRN
Status: DISCONTINUED | OUTPATIENT
Start: 2024-05-10 | End: 2024-05-15 | Stop reason: HOSPADM

## 2024-05-10 RX ORDER — ALBUTEROL SULFATE 90 UG/1
2 AEROSOL, METERED RESPIRATORY (INHALATION) PRN
Status: DISCONTINUED | OUTPATIENT
Start: 2024-05-10 | End: 2024-05-15 | Stop reason: HOSPADM

## 2024-05-10 RX ORDER — CLOPIDOGREL BISULFATE 75 MG/1
75 TABLET ORAL DAILY
Status: DISCONTINUED | OUTPATIENT
Start: 2024-05-11 | End: 2024-05-15 | Stop reason: HOSPADM

## 2024-05-10 RX ORDER — LEVOTHYROXINE SODIUM 0.03 MG/1
50 TABLET ORAL
Status: DISCONTINUED | OUTPATIENT
Start: 2024-05-11 | End: 2024-05-15 | Stop reason: HOSPADM

## 2024-05-10 RX ORDER — INSULIN LISPRO 100 [IU]/ML
0-4 INJECTION, SOLUTION INTRAVENOUS; SUBCUTANEOUS NIGHTLY
Status: DISCONTINUED | OUTPATIENT
Start: 2024-05-10 | End: 2024-05-15 | Stop reason: HOSPADM

## 2024-05-10 RX ORDER — SODIUM CHLORIDE 9 MG/ML
INJECTION, SOLUTION INTRAVENOUS PRN
Status: DISCONTINUED | OUTPATIENT
Start: 2024-05-10 | End: 2024-05-15 | Stop reason: HOSPADM

## 2024-05-10 RX ORDER — INSULIN LISPRO 100 [IU]/ML
0-4 INJECTION, SOLUTION INTRAVENOUS; SUBCUTANEOUS
Status: DISCONTINUED | OUTPATIENT
Start: 2024-05-10 | End: 2024-05-15 | Stop reason: HOSPADM

## 2024-05-10 RX ORDER — LABETALOL HYDROCHLORIDE 5 MG/ML
10 INJECTION, SOLUTION INTRAVENOUS
Status: DISCONTINUED | OUTPATIENT
Start: 2024-05-10 | End: 2024-05-10

## 2024-05-10 RX ORDER — ASPIRIN 81 MG/1
81 TABLET, CHEWABLE ORAL DAILY
Status: DISCONTINUED | OUTPATIENT
Start: 2024-05-11 | End: 2024-05-15 | Stop reason: HOSPADM

## 2024-05-10 RX ORDER — SPIRONOLACTONE 25 MG/1
25 TABLET ORAL DAILY
Status: DISCONTINUED | OUTPATIENT
Start: 2024-05-10 | End: 2024-05-15 | Stop reason: HOSPADM

## 2024-05-10 RX ADMIN — SACUBITRIL AND VALSARTAN 1 TABLET: 24; 26 TABLET, FILM COATED ORAL at 21:06

## 2024-05-10 RX ADMIN — DILTIAZEM HYDROCHLORIDE 10 MG: 5 INJECTION, SOLUTION INTRAVENOUS at 14:38

## 2024-05-10 RX ADMIN — ROSUVASTATIN CALCIUM 20 MG: 20 TABLET, COATED ORAL at 21:06

## 2024-05-10 RX ADMIN — IOPAMIDOL 100 ML: 755 INJECTION, SOLUTION INTRAVENOUS at 16:15

## 2024-05-10 RX ADMIN — METOPROLOL SUCCINATE 25 MG: 25 TABLET, EXTENDED RELEASE ORAL at 21:06

## 2024-05-10 RX ADMIN — SPIRONOLACTONE 25 MG: 25 TABLET ORAL at 21:05

## 2024-05-10 RX ADMIN — SODIUM CHLORIDE, PRESERVATIVE FREE 10 ML: 5 INJECTION INTRAVENOUS at 21:13

## 2024-05-10 RX ADMIN — DILTIAZEM HYDROCHLORIDE 2.5 MG/HR: 5 INJECTION, SOLUTION INTRAVENOUS at 14:39

## 2024-05-10 ASSESSMENT — LIFESTYLE VARIABLES
HOW MANY STANDARD DRINKS CONTAINING ALCOHOL DO YOU HAVE ON A TYPICAL DAY: PATIENT DOES NOT DRINK
HOW OFTEN DO YOU HAVE A DRINK CONTAINING ALCOHOL: NEVER

## 2024-05-10 ASSESSMENT — PAIN SCALES - GENERAL
PAINLEVEL_OUTOF10: 8
PAINLEVEL_OUTOF10: 5

## 2024-05-10 ASSESSMENT — PAIN DESCRIPTION - LOCATION
LOCATION: ABDOMEN
LOCATION: ABDOMEN

## 2024-05-10 ASSESSMENT — PAIN - FUNCTIONAL ASSESSMENT: PAIN_FUNCTIONAL_ASSESSMENT: 0-10

## 2024-05-10 NOTE — ED TRIAGE NOTES
[facility] pt's daughter called for pt c/o back pain; upon EMS arrival pt was c/o pain that was radiating to her ribs; after EMS obtained EKG  after pt became diaphoretic and nauseated; they noted that pt is in Afib which is new per pt and pt's daughter

## 2024-05-10 NOTE — ED PROVIDER NOTES
[facility] EMERGENCY DEPT  EMERGENCY DEPARTMENT HISTORY AND PHYSICAL EXAM      Date: 5/10/2024  Patient Name: Kimber Morris  MRN: [id number]  Birthdate 1945  Date of evaluation: 5/10/2024  Provider: Anisa Vences MD   Note Started: 12:14 PM EDT 5/10/24    HISTORY OF PRESENT ILLNESS     Chief Complaint   Patient presents with    Back Pain    Atrial Fibrillation       History Provided By: Patient    HPI: Kimber Morris is a 78 y.o. female patient was complaining of back pain rib pain that radiates to the chest and back.  Symptoms started today with diaphoresis and nausea.  Patient notes that she has had no falls or trauma.  Pain is presently in the mid back.  No numbness or weakness.  No shortness of breath now.    PAST MEDICAL HISTORY   Past Medical History:  Past Medical History:   Diagnosis Date    Asthma     Chest discomfort     pt states off & on for few months with or without activity    CHF (congestive heart failure) (HCC)     DM (diabetes mellitus) (HCC)     GERD (gastroesophageal reflux disease)     HTN (hypertension)     Thyroid disease        Past Surgical History:  Past Surgical History:   Procedure Laterality Date    HYSTERECTOMY (CERVIX STATUS UNKNOWN)      MOHS SURGERY         Family History:  Family History   Problem Relation Age of Onset    Hypertension Mother     Diabetes Mother     Heart Disease Mother        Social History:  Social History     Tobacco Use    Smoking status: Never    Smokeless tobacco: Never   Vaping Use    Vaping Use: Never used   Substance Use Topics    Alcohol use: No    Drug use: Never       Allergies:  No Known Allergies    PCP: Jennifer Horn FNP    Current Meds:   No current facility-administered medications for this encounter.     Current Outpatient Medications   Medication Sig Dispense Refill    albuterol sulfate HFA (VENTOLIN HFA) 108 (90 Base) MCG/ACT inhaler Inhale 2 puffs into the lungs as needed      amLODIPine (NORVASC) 5 MG tablet Take 1 tablet by mouth daily

## 2024-05-10 NOTE — ED NOTES
ED TO INPATIENT SBAR HANDOFF    Patient Name: Kimber Morris   Preferred Name: Kimber  : 1945  78 y.o.   Family/Caregiver Present: no   Code Status Order: Full Code  PO Status: NPO:No Clear liquid, no caffeine   Telemetry Order:   C-SSRS: Risk of Suicide: No Risk  Sitter no   Restraints:     Sepsis Risk Score Sepsis Risk Score: 0.76    Situation  Chief Complaint   Patient presents with    Back Pain    Atrial Fibrillation     Brief Description of Patient's Condition: Pt having chest pain and in Afib  Mental Status: oriented and alert  Arrived from:Home  Imaging:   CTA CHEST W WO CONTRAST PE Eval   Final Result   Left lower lobe partial collapse.   No pulmonary embolism         XR THORACIC SPINE (3 VIEWS)   Final Result   Multilevel degenerative disc disease                  XR CHEST PORTABLE   Final Result   No acute cardiopulmonary disease.           Abnormal labs:   Abnormal Labs Reviewed   BASIC METABOLIC PANEL - Abnormal; Notable for the following components:       Result Value    Glucose 138 (*)     Creatinine 1.20 (*)     Est, Glom Filt Rate 46 (*)     All other components within normal limits       Background  Allergies: No Known Allergies  History:   Past Medical History:   Diagnosis Date    Asthma     Chest discomfort     pt states off & on for few months with or without activity    CHF (congestive heart failure) (HCC)     DM (diabetes mellitus) (HCC)     GERD (gastroesophageal reflux disease)     HTN (hypertension)     Thyroid disease        Assessment  Vitals:        Vitals:    05/10/24 1608 05/10/24 1615 05/10/24 1700 05/10/24 1730   BP:   (!) 147/89    Pulse: 89 (!) 102 85 99   Resp: 27 21 24 30   Temp:       TempSrc:       SpO2: 97% 97% 97% 97%   Weight:       Height:         Deterioration Index (DI): Deterioration Index: 41.94  Deterioration Index (DI) Interventions Performed:    O2 Flow Rate:    O2 Device: O2 Device: None (Room air)  Cardiac Rhythm:    Critical Lab Results: [unfilled]  Cultures:

## 2024-05-10 NOTE — H&P
Hospitalist Admission Note    NAME:   Kimber Morris   : 1945   MRN: [id number]     Date/Time: 5/10/2024 5:41 PM    Patient PCP: Jennifer Horn FNP    ______________________________________________________________________  Given the patient's current clinical presentation, I have a high level of concern for decompensation if discharged from the emergency department.  Complex decision making was performed, which includes reviewing the patient's available past medical records, laboratory results, and x-ray films.       My assessment of this patient's clinical condition and my plan of care is as follows.    Assessment / Plan:    Atrial fibrillation with RVR  Chest pain    Admit patient to stepdown  Start patient on Cardizem drip  Cardiology consultation  Echocardiogram  Check TSH level  Follow-up troponin    Back pain  Follow-up spine MRI        CTA chest showed left lower lobe partial collapse  Pulmonary consultation    Hypertension  Continue home antihypertensive medication    DM  Hold oral hypoglycemic medication  Start patient on sliding scale    Hyperlipidemia  Continue Lipitor    Hypothyroidism  Check TSH level  Continue Synthyroid    History of asthma  Continue home medication      Weight loss  Follow-up abdominal CT scan  Check TSH level          Medical Decision Making:   I personally reviewed labs: CBC, BMP  I personally reviewed imaging: Chest x-ray  I personally reviewed EKG: Yes  Toxic drug monitoring:   Discussed case with: ED provider. After discussion I am in agreement that acuity of patient's medical condition necessitates hospital stay.      Code Status: Full  DVT Prophylaxis: Lovenox  Baseline:     Subjective:   CHIEF COMPLAINT: Chest pain    HISTORY OF PRESENT ILLNESS:     Kimber Morris is a 78 y.o.  female with PMHx significant for CHF, DM, GERD, hypertension, thyroid disease presented to the hospital for evaluation of chest pain radiated to the back associated with diaphoresis and

## 2024-05-11 ENCOUNTER — APPOINTMENT (OUTPATIENT)
Facility: HOSPITAL | Age: 79
DRG: 309 | End: 2024-05-11
Payer: MEDICARE

## 2024-05-11 LAB
ALBUMIN SERPL-MCNC: 3.5 G/DL (ref 3.5–5)
ALBUMIN/GLOB SERPL: 0.9 (ref 1.1–2.2)
ALP SERPL-CCNC: 64 U/L (ref 45–117)
ALT SERPL-CCNC: 15 U/L (ref 12–78)
ANION GAP SERPL CALC-SCNC: 10 MMOL/L (ref 5–15)
AST SERPL W P-5'-P-CCNC: 22 U/L (ref 15–37)
BILIRUB SERPL-MCNC: 0.5 MG/DL (ref 0.2–1)
BUN SERPL-MCNC: 14 MG/DL (ref 6–20)
BUN/CREAT SERPL: 15 (ref 12–20)
CA-I BLD-MCNC: 8.9 MG/DL (ref 8.5–10.1)
CHLORIDE SERPL-SCNC: 104 MMOL/L (ref 97–108)
CO2 SERPL-SCNC: 21 MMOL/L (ref 21–32)
CREAT SERPL-MCNC: 0.93 MG/DL (ref 0.55–1.02)
ECHO AR MAX VEL PISA: 3.8 M/S
ECHO AV PEAK GRADIENT: 7 MMHG
ECHO AV PEAK VELOCITY: 1.4 M/S
ECHO AV REGURGITANT PHT: 313 MS
ECHO AV VELOCITY RATIO: 0.64
ECHO BSA: 1.61 M2
ECHO EST RA PRESSURE: 3 MMHG
ECHO IVC EXP: 1 CM
ECHO LA AREA 2C: 13.3 CM2
ECHO LA AREA 4C: 16.5 CM2
ECHO LA DIAMETER INDEX: 2.2 CM/M2
ECHO LA DIAMETER: 3.5 CM
ECHO LA MAJOR AXIS: 4.7 CM
ECHO LA MINOR AXIS: 4.8 CM
ECHO LA VOL BP: 36 ML (ref 22–52)
ECHO LA VOL MOD A2C: 29 ML (ref 22–52)
ECHO LA VOL MOD A4C: 43 ML (ref 22–52)
ECHO LA VOL/BSA BIPLANE: 23 ML/M2 (ref 16–34)
ECHO LA VOLUME INDEX MOD A2C: 18 ML/M2 (ref 16–34)
ECHO LA VOLUME INDEX MOD A4C: 27 ML/M2 (ref 16–34)
ECHO LV E' LATERAL VELOCITY: 9 CM/S
ECHO LV E' SEPTAL VELOCITY: 6 CM/S
ECHO LV FRACTIONAL SHORTENING: 27 % (ref 28–44)
ECHO LV INTERNAL DIMENSION DIASTOLE INDEX: 2.58 CM/M2
ECHO LV INTERNAL DIMENSION DIASTOLIC: 4.1 CM (ref 3.9–5.3)
ECHO LV INTERNAL DIMENSION SYSTOLIC INDEX: 1.89 CM/M2
ECHO LV INTERNAL DIMENSION SYSTOLIC: 3 CM
ECHO LV IVSD: 1 CM (ref 0.6–0.9)
ECHO LV MASS 2D: 151.3 G (ref 67–162)
ECHO LV MASS INDEX 2D: 95.2 G/M2 (ref 43–95)
ECHO LV POSTERIOR WALL DIASTOLIC: 1.2 CM (ref 0.6–0.9)
ECHO LV RELATIVE WALL THICKNESS RATIO: 0.59
ECHO LVOT PEAK GRADIENT: 3 MMHG
ECHO LVOT PEAK VELOCITY: 0.9 M/S
ECHO MV A VELOCITY: 0.8 M/S
ECHO MV E DECELERATION TIME (DT): 244 MS
ECHO MV E VELOCITY: 0.67 M/S
ECHO MV E/A RATIO: 0.84
ECHO MV E/E' LATERAL: 7.44
ECHO MV E/E' RATIO (AVERAGED): 9.31
ECHO MV E/E' SEPTAL: 11.17
ECHO MV REGURGITANT PEAK GRADIENT: 135 MMHG
ECHO MV REGURGITANT PEAK VELOCITY: 5.8 M/S
ECHO MV REGURGITANT VTIA: 152 CM
ECHO PV ACCELERATION TIME (AT): 55 MS
ECHO PV MAX VELOCITY: 1.3 M/S
ECHO PV PEAK GRADIENT: 7 MMHG
ECHO RA AREA 4C: 13.8 CM2
ECHO RA END SYSTOLIC VOLUME APICAL 4 CHAMBER INDEX BSA: 19 ML/M2
ECHO RA VOLUME: 31 ML
ECHO RIGHT VENTRICULAR SYSTOLIC PRESSURE (RVSP): 42 MMHG
ECHO RV BASAL DIMENSION: 2.6 CM
ECHO RV FREE WALL PEAK S': 10 CM/S
ECHO RV TAPSE: 1.7 CM (ref 1.7–?)
ECHO TV REGURGITANT MAX VELOCITY: 3.12 M/S
ECHO TV REGURGITANT PEAK GRADIENT: 39 MMHG
EKG ATRIAL RATE: 86 BPM
EKG DIAGNOSIS: NORMAL
EKG P AXIS: 78 DEGREES
EKG P-R INTERVAL: 176 MS
EKG Q-T INTERVAL: 360 MS
EKG QRS DURATION: 86 MS
EKG QTC CALCULATION (BAZETT): 430 MS
EKG R AXIS: 28 DEGREES
EKG T AXIS: 42 DEGREES
EKG VENTRICULAR RATE: 86 BPM
ERYTHROCYTE [DISTWIDTH] IN BLOOD BY AUTOMATED COUNT: 12.9 % (ref 11.5–14.5)
GLOBULIN SER CALC-MCNC: 4 G/DL (ref 2–4)
GLUCOSE BLD STRIP.AUTO-MCNC: 108 MG/DL (ref 65–100)
GLUCOSE BLD STRIP.AUTO-MCNC: 112 MG/DL (ref 65–100)
GLUCOSE BLD STRIP.AUTO-MCNC: 135 MG/DL (ref 65–100)
GLUCOSE BLD STRIP.AUTO-MCNC: 140 MG/DL (ref 65–100)
GLUCOSE SERPL-MCNC: 185 MG/DL (ref 65–100)
HCT VFR BLD AUTO: 44.9 % (ref 35–47)
HGB BLD-MCNC: 15.2 G/DL (ref 11.5–16)
MCH RBC QN AUTO: 31.4 PG (ref 26–34)
MCHC RBC AUTO-ENTMCNC: 33.9 G/DL (ref 30–36.5)
MCV RBC AUTO: 92.8 FL (ref 80–99)
NRBC # BLD: 0 K/UL (ref 0–0.01)
NRBC BLD-RTO: 0 PER 100 WBC
PERFORMED BY:: ABNORMAL
PLATELET # BLD AUTO: 259 K/UL (ref 150–400)
PMV BLD AUTO: 9 FL (ref 8.9–12.9)
POTASSIUM SERPL-SCNC: 3.6 MMOL/L (ref 3.5–5.1)
PROT SERPL-MCNC: 7.5 G/DL (ref 6.4–8.2)
RBC # BLD AUTO: 4.84 M/UL (ref 3.8–5.2)
SODIUM SERPL-SCNC: 135 MMOL/L (ref 136–145)
T4 FREE SERPL-MCNC: 1.5 NG/DL (ref 0.8–1.5)
TSH SERPL DL<=0.05 MIU/L-ACNC: 0.32 UIU/ML (ref 0.36–3.74)
WBC # BLD AUTO: 9.9 K/UL (ref 3.6–11)

## 2024-05-11 PROCEDURE — 94761 N-INVAS EAR/PLS OXIMETRY MLT: CPT

## 2024-05-11 PROCEDURE — 2580000003 HC RX 258: Performed by: INTERNAL MEDICINE

## 2024-05-11 PROCEDURE — 2060000000 HC ICU INTERMEDIATE R&B

## 2024-05-11 PROCEDURE — 6370000000 HC RX 637 (ALT 250 FOR IP): Performed by: INTERNAL MEDICINE

## 2024-05-11 PROCEDURE — 85027 COMPLETE CBC AUTOMATED: CPT

## 2024-05-11 PROCEDURE — 6360000002 HC RX W HCPCS: Performed by: INTERNAL MEDICINE

## 2024-05-11 PROCEDURE — 36415 COLL VENOUS BLD VENIPUNCTURE: CPT

## 2024-05-11 PROCEDURE — 2580000003 HC RX 258: Performed by: EMERGENCY MEDICINE

## 2024-05-11 PROCEDURE — 72146 MRI CHEST SPINE W/O DYE: CPT

## 2024-05-11 PROCEDURE — 80053 COMPREHEN METABOLIC PANEL: CPT

## 2024-05-11 PROCEDURE — 2500000003 HC RX 250 WO HCPCS: Performed by: EMERGENCY MEDICINE

## 2024-05-11 PROCEDURE — 93005 ELECTROCARDIOGRAM TRACING: CPT | Performed by: INTERNAL MEDICINE

## 2024-05-11 PROCEDURE — 82962 GLUCOSE BLOOD TEST: CPT

## 2024-05-11 PROCEDURE — 94640 AIRWAY INHALATION TREATMENT: CPT

## 2024-05-11 RX ORDER — ACETYLCYSTEINE 200 MG/ML
1000 SOLUTION ORAL; RESPIRATORY (INHALATION)
Status: DISCONTINUED | OUTPATIENT
Start: 2024-05-11 | End: 2024-05-12

## 2024-05-11 RX ORDER — HYDRALAZINE HYDROCHLORIDE 50 MG/1
25 TABLET, FILM COATED ORAL 3 TIMES DAILY
Status: DISCONTINUED | OUTPATIENT
Start: 2024-05-11 | End: 2024-05-15 | Stop reason: HOSPADM

## 2024-05-11 RX ORDER — IPRATROPIUM BROMIDE AND ALBUTEROL SULFATE 2.5; .5 MG/3ML; MG/3ML
1 SOLUTION RESPIRATORY (INHALATION)
Status: DISCONTINUED | OUTPATIENT
Start: 2024-05-11 | End: 2024-05-12

## 2024-05-11 RX ADMIN — SACUBITRIL AND VALSARTAN 1 TABLET: 24; 26 TABLET, FILM COATED ORAL at 08:34

## 2024-05-11 RX ADMIN — EMPAGLIFLOZIN 10 MG: 10 TABLET, FILM COATED ORAL at 08:35

## 2024-05-11 RX ADMIN — LEVOTHYROXINE SODIUM 50 MCG: 0.03 TABLET ORAL at 08:34

## 2024-05-11 RX ADMIN — METOPROLOL SUCCINATE 25 MG: 25 TABLET, EXTENDED RELEASE ORAL at 08:35

## 2024-05-11 RX ADMIN — DILTIAZEM HYDROCHLORIDE 5 MG/HR: 5 INJECTION, SOLUTION INTRAVENOUS at 08:47

## 2024-05-11 RX ADMIN — ACETYLCYSTEINE 1000 MG: 200 SOLUTION ORAL; RESPIRATORY (INHALATION) at 16:37

## 2024-05-11 RX ADMIN — HYDRALAZINE HYDROCHLORIDE 25 MG: 50 TABLET ORAL at 13:17

## 2024-05-11 RX ADMIN — SODIUM CHLORIDE, PRESERVATIVE FREE 10 ML: 5 INJECTION INTRAVENOUS at 08:35

## 2024-05-11 RX ADMIN — SODIUM CHLORIDE, PRESERVATIVE FREE 10 ML: 5 INJECTION INTRAVENOUS at 21:24

## 2024-05-11 RX ADMIN — IPRATROPIUM BROMIDE AND ALBUTEROL SULFATE 1 DOSE: 2.5; .5 SOLUTION RESPIRATORY (INHALATION) at 22:06

## 2024-05-11 RX ADMIN — ASPIRIN 81 MG 81 MG: 81 TABLET ORAL at 08:34

## 2024-05-11 RX ADMIN — HYDRALAZINE HYDROCHLORIDE 25 MG: 50 TABLET ORAL at 21:23

## 2024-05-11 RX ADMIN — ENOXAPARIN SODIUM 40 MG: 100 INJECTION SUBCUTANEOUS at 08:34

## 2024-05-11 RX ADMIN — ACETYLCYSTEINE 1000 MG: 200 SOLUTION ORAL; RESPIRATORY (INHALATION) at 22:06

## 2024-05-11 RX ADMIN — HYDRALAZINE HYDROCHLORIDE 25 MG: 50 TABLET ORAL at 08:56

## 2024-05-11 RX ADMIN — IPRATROPIUM BROMIDE AND ALBUTEROL SULFATE 1 DOSE: 2.5; .5 SOLUTION RESPIRATORY (INHALATION) at 16:38

## 2024-05-11 RX ADMIN — CLOPIDOGREL BISULFATE 75 MG: 75 TABLET ORAL at 08:35

## 2024-05-11 RX ADMIN — SACUBITRIL AND VALSARTAN 1 TABLET: 24; 26 TABLET, FILM COATED ORAL at 21:23

## 2024-05-11 RX ADMIN — ROSUVASTATIN CALCIUM 20 MG: 20 TABLET, COATED ORAL at 21:23

## 2024-05-11 RX ADMIN — SPIRONOLACTONE 25 MG: 25 TABLET ORAL at 08:35

## 2024-05-11 ASSESSMENT — PAIN SCALES - GENERAL
PAINLEVEL_OUTOF10: 0

## 2024-05-12 ENCOUNTER — APPOINTMENT (OUTPATIENT)
Facility: HOSPITAL | Age: 79
DRG: 309 | End: 2024-05-12
Payer: MEDICARE

## 2024-05-12 LAB
APPEARANCE UR: CLEAR
BACTERIA URNS QL MICRO: NEGATIVE /HPF
BILIRUB UR QL: NEGATIVE
COLOR UR: ABNORMAL
EKG ATRIAL RATE: 93 BPM
EKG DIAGNOSIS: NORMAL
EKG P AXIS: 86 DEGREES
EKG P-R INTERVAL: 152 MS
EKG Q-T INTERVAL: 362 MS
EKG QRS DURATION: 82 MS
EKG QTC CALCULATION (BAZETT): 450 MS
EKG R AXIS: 55 DEGREES
EKG T AXIS: 67 DEGREES
EKG VENTRICULAR RATE: 93 BPM
EPITH CASTS URNS QL MICRO: ABNORMAL /LPF
ERYTHROCYTE [DISTWIDTH] IN BLOOD BY AUTOMATED COUNT: 13.1 % (ref 11.5–14.5)
GLUCOSE BLD STRIP.AUTO-MCNC: 130 MG/DL (ref 65–100)
GLUCOSE BLD STRIP.AUTO-MCNC: 168 MG/DL (ref 65–100)
GLUCOSE BLD STRIP.AUTO-MCNC: 188 MG/DL (ref 65–100)
GLUCOSE BLD STRIP.AUTO-MCNC: 207 MG/DL (ref 65–100)
GLUCOSE UR STRIP.AUTO-MCNC: >500 MG/DL
HCT VFR BLD AUTO: 49 % (ref 35–47)
HGB BLD-MCNC: 16.6 G/DL (ref 11.5–16)
HGB UR QL STRIP: ABNORMAL
HYALINE CASTS URNS QL MICRO: ABNORMAL /LPF (ref 0–5)
KETONES UR QL STRIP.AUTO: NEGATIVE MG/DL
LEUKOCYTE ESTERASE UR QL STRIP.AUTO: NEGATIVE
MCH RBC QN AUTO: 31.3 PG (ref 26–34)
MCHC RBC AUTO-ENTMCNC: 33.9 G/DL (ref 30–36.5)
MCV RBC AUTO: 92.5 FL (ref 80–99)
MUCOUS THREADS URNS QL MICRO: ABNORMAL /LPF
NITRITE UR QL STRIP.AUTO: NEGATIVE
NRBC # BLD: 0 K/UL (ref 0–0.01)
NRBC BLD-RTO: 0 PER 100 WBC
PERFORMED BY:: ABNORMAL
PH UR STRIP: 5 (ref 5–8)
PLATELET # BLD AUTO: 309 K/UL (ref 150–400)
PMV BLD AUTO: 9.3 FL (ref 8.9–12.9)
PROT UR STRIP-MCNC: >300 MG/DL
RBC # BLD AUTO: 5.3 M/UL (ref 3.8–5.2)
RBC #/AREA URNS HPF: ABNORMAL /HPF (ref 0–5)
SP GR UR REFRACTOMETRY: 1.03 (ref 1–1.03)
URINE CULTURE IF INDICATED: ABNORMAL
UROBILINOGEN UR QL STRIP.AUTO: 0.1 EU/DL (ref 0.1–1)
WBC # BLD AUTO: 13.9 K/UL (ref 3.6–11)
WBC URNS QL MICRO: ABNORMAL /HPF (ref 0–4)

## 2024-05-12 PROCEDURE — 94761 N-INVAS EAR/PLS OXIMETRY MLT: CPT

## 2024-05-12 PROCEDURE — 72148 MRI LUMBAR SPINE W/O DYE: CPT

## 2024-05-12 PROCEDURE — 94640 AIRWAY INHALATION TREATMENT: CPT

## 2024-05-12 PROCEDURE — 94667 MNPJ CHEST WALL 1ST: CPT

## 2024-05-12 PROCEDURE — 2580000003 HC RX 258: Performed by: EMERGENCY MEDICINE

## 2024-05-12 PROCEDURE — 6360000002 HC RX W HCPCS: Performed by: INTERNAL MEDICINE

## 2024-05-12 PROCEDURE — 36415 COLL VENOUS BLD VENIPUNCTURE: CPT

## 2024-05-12 PROCEDURE — 2500000003 HC RX 250 WO HCPCS: Performed by: EMERGENCY MEDICINE

## 2024-05-12 PROCEDURE — 6370000000 HC RX 637 (ALT 250 FOR IP): Performed by: INTERNAL MEDICINE

## 2024-05-12 PROCEDURE — 82962 GLUCOSE BLOOD TEST: CPT

## 2024-05-12 PROCEDURE — 2580000003 HC RX 258: Performed by: INTERNAL MEDICINE

## 2024-05-12 PROCEDURE — 93005 ELECTROCARDIOGRAM TRACING: CPT | Performed by: INTERNAL MEDICINE

## 2024-05-12 PROCEDURE — 85027 COMPLETE CBC AUTOMATED: CPT

## 2024-05-12 PROCEDURE — 2060000000 HC ICU INTERMEDIATE R&B

## 2024-05-12 PROCEDURE — 81001 URINALYSIS AUTO W/SCOPE: CPT

## 2024-05-12 PROCEDURE — 94668 MNPJ CHEST WALL SBSQ: CPT

## 2024-05-12 RX ORDER — ACETYLCYSTEINE 200 MG/ML
1000 SOLUTION ORAL; RESPIRATORY (INHALATION)
Status: DISCONTINUED | OUTPATIENT
Start: 2024-05-12 | End: 2024-05-15 | Stop reason: HOSPADM

## 2024-05-12 RX ORDER — IPRATROPIUM BROMIDE AND ALBUTEROL SULFATE 2.5; .5 MG/3ML; MG/3ML
1 SOLUTION RESPIRATORY (INHALATION)
Status: DISCONTINUED | OUTPATIENT
Start: 2024-05-12 | End: 2024-05-15 | Stop reason: HOSPADM

## 2024-05-12 RX ADMIN — CLOPIDOGREL BISULFATE 75 MG: 75 TABLET ORAL at 10:03

## 2024-05-12 RX ADMIN — SACUBITRIL AND VALSARTAN 1 TABLET: 24; 26 TABLET, FILM COATED ORAL at 21:31

## 2024-05-12 RX ADMIN — METOPROLOL SUCCINATE 25 MG: 25 TABLET, EXTENDED RELEASE ORAL at 10:04

## 2024-05-12 RX ADMIN — ACETYLCYSTEINE 1000 MG: 200 SOLUTION ORAL; RESPIRATORY (INHALATION) at 14:18

## 2024-05-12 RX ADMIN — ACETYLCYSTEINE 1000 MG: 200 SOLUTION ORAL; RESPIRATORY (INHALATION) at 06:47

## 2024-05-12 RX ADMIN — IPRATROPIUM BROMIDE AND ALBUTEROL SULFATE 1 DOSE: 2.5; .5 SOLUTION RESPIRATORY (INHALATION) at 06:46

## 2024-05-12 RX ADMIN — ENOXAPARIN SODIUM 40 MG: 100 INJECTION SUBCUTANEOUS at 10:04

## 2024-05-12 RX ADMIN — SACUBITRIL AND VALSARTAN 1 TABLET: 24; 26 TABLET, FILM COATED ORAL at 10:03

## 2024-05-12 RX ADMIN — DILTIAZEM HYDROCHLORIDE 5 MG/HR: 5 INJECTION, SOLUTION INTRAVENOUS at 11:43

## 2024-05-12 RX ADMIN — ASPIRIN 81 MG 81 MG: 81 TABLET ORAL at 10:04

## 2024-05-12 RX ADMIN — ROSUVASTATIN CALCIUM 20 MG: 20 TABLET, COATED ORAL at 21:31

## 2024-05-12 RX ADMIN — IPRATROPIUM BROMIDE AND ALBUTEROL SULFATE 1 DOSE: 2.5; .5 SOLUTION RESPIRATORY (INHALATION) at 21:34

## 2024-05-12 RX ADMIN — HYDRALAZINE HYDROCHLORIDE 25 MG: 50 TABLET ORAL at 10:03

## 2024-05-12 RX ADMIN — SODIUM CHLORIDE, PRESERVATIVE FREE 10 ML: 5 INJECTION INTRAVENOUS at 21:36

## 2024-05-12 RX ADMIN — LEVOTHYROXINE SODIUM 50 MCG: 0.03 TABLET ORAL at 06:12

## 2024-05-12 RX ADMIN — EMPAGLIFLOZIN 10 MG: 10 TABLET, FILM COATED ORAL at 10:04

## 2024-05-12 RX ADMIN — SPIRONOLACTONE 25 MG: 25 TABLET ORAL at 10:04

## 2024-05-12 RX ADMIN — ACETYLCYSTEINE 1000 MG: 200 SOLUTION ORAL; RESPIRATORY (INHALATION) at 21:34

## 2024-05-12 RX ADMIN — IPRATROPIUM BROMIDE AND ALBUTEROL SULFATE 1 DOSE: 2.5; .5 SOLUTION RESPIRATORY (INHALATION) at 14:18

## 2024-05-12 RX ADMIN — SODIUM CHLORIDE, PRESERVATIVE FREE 10 ML: 5 INJECTION INTRAVENOUS at 21:31

## 2024-05-12 ASSESSMENT — PAIN SCALES - GENERAL: PAINLEVEL_OUTOF10: 0

## 2024-05-12 NOTE — CARE COORDINATION
05/12/24 1437   Service Assessment   Patient Orientation Alert and Oriented   Cognition Alert   History Provided By Child/Family   Primary Caregiver Family   Accompanied By/Relationship Daughter, grandson in the room with patient.  Along with other family members   Support Systems Spouse/Significant Other   PCP Verified by CM Yes  (Dr. Campbell)   Last Visit to PCP Within last 3 months   Prior Functional Level Independent in ADLs/IADLs   Current Functional Level Independent in ADLs/IADLs   Can patient return to prior living arrangement Yes   Ability to make needs known: Good   Family able to assist with home care needs: Yes   Would you like for me to discuss the discharge plan with any other family members/significant others, and if so, who? Yes   Financial Resources Medicare   Community Resources None   Social/Functional History   Lives With Spouse;Family   Type of Home House   Home Layout One level   Home Access Stairs to enter with rails   Entrance Stairs - Number of Steps 3   Entrance Stairs - Rails Both   Home Equipment Cane   Receives Help From Family   ADL Assistance Independent   Homemaking Assistance Independent   Ambulation Assistance Independent   Transfer Assistance Independent   Active  No   Discharge Planning   Type of Residence House   Living Arrangements Spouse/Significant Other;Children   Current Services Prior To Admission None   Potential Assistance Needed N/A   DME Ordered? No   Type of Home Care Services None   Patient expects to be discharged to: House   Services At/After Discharge   Transition of Care Consult (CM Consult) Home Health   Confirm Follow Up Transport Family     Patient receives medication from [facility].  Patient's daughter will transport at discharge.  No hx of , rehab.      Advance Care Planning     General Advance Care Planning (ACP) Conversation    Date of Conversation: 5/12/2024  Conducted with: Patient with Decision Making Capacity  Other persons present:

## 2024-05-12 NOTE — RT PROTOCOL NOTE
RT Inhaler-Nebulizer Bronchodilator Protocol Note    There is a bronchodilator order in the chart from a provider indicating to follow the RT Bronchodilator Protocol and there is an “Initiate RT Inhaler-Nebulizer Bronchodilator Protocol” order as well (see protocol at bottom of note).    CXR Findings:  XR CHEST PORTABLE    Result Date: 5/10/2024  No acute cardiopulmonary disease.       The findings from the last RT Protocol Assessment were as follows:   History Pulmonary Disease: Chronic pulmonary disease  Respiratory Pattern: Regular pattern and RR 12-20 bpm  Breath Sounds: Slightly diminished and/or crackles  Cough: Weak, non-productive  Indication for Bronchodilator Therapy: On home bronchodilators  Bronchodilator Assessment Score: 7    Aerosolized bronchodilator medication orders have been revised according to the RT Inhaler-Nebulizer Bronchodilator Protocol below.    Respiratory Therapist to perform RT Therapy Protocol Assessment initially then follow the protocol.  Repeat RT Therapy Protocol Assessment PRN for score 0-3 or on second treatment, BID, and PRN for scores above 3.    No Indications - adjust the frequency to every 6 hours PRN wheezing or bronchospasm, if no treatments needed after 48 hours then discontinue using Per Protocol order mode.     If indication present, adjust the RT bronchodilator orders based on the Bronchodilator Assessment Score as indicated below.  Use Inhaler orders unless patient has one or more of the following: on home nebulizer, not able to hold breath for 10 seconds, is not alert and oriented, cannot activate and use MDI correctly, or respiratory rate 25 breaths per minute or more, then use the equivalent nebulizer order(s) with same Frequency and PRN reasons based on the score.  If a patient is on this medication at home then do not decrease Frequency below that used at home.    0-3 - enter or revise RT bronchodilator order(s) to equivalent RT Bronchodilator order with

## 2024-05-13 ENCOUNTER — APPOINTMENT (OUTPATIENT)
Facility: HOSPITAL | Age: 79
DRG: 309 | End: 2024-05-13
Payer: MEDICARE

## 2024-05-13 LAB
ANION GAP SERPL CALC-SCNC: 6 MMOL/L (ref 5–15)
BUN SERPL-MCNC: 62 MG/DL (ref 6–20)
BUN/CREAT SERPL: 21 (ref 12–20)
CA-I BLD-MCNC: 9.2 MG/DL (ref 8.5–10.1)
CHLORIDE SERPL-SCNC: 100 MMOL/L (ref 97–108)
CO2 SERPL-SCNC: 26 MMOL/L (ref 21–32)
CREAT SERPL-MCNC: 2.89 MG/DL (ref 0.55–1.02)
GLUCOSE BLD STRIP.AUTO-MCNC: 119 MG/DL (ref 65–100)
GLUCOSE BLD STRIP.AUTO-MCNC: 255 MG/DL (ref 65–100)
GLUCOSE SERPL-MCNC: 145 MG/DL (ref 65–100)
PERFORMED BY:: ABNORMAL
POTASSIUM SERPL-SCNC: 3.7 MMOL/L (ref 3.5–5.1)
SODIUM SERPL-SCNC: 132 MMOL/L (ref 136–145)

## 2024-05-13 PROCEDURE — 94761 N-INVAS EAR/PLS OXIMETRY MLT: CPT

## 2024-05-13 PROCEDURE — 80048 BASIC METABOLIC PNL TOTAL CA: CPT

## 2024-05-13 PROCEDURE — 6370000000 HC RX 637 (ALT 250 FOR IP): Performed by: INTERNAL MEDICINE

## 2024-05-13 PROCEDURE — 6360000002 HC RX W HCPCS: Performed by: INTERNAL MEDICINE

## 2024-05-13 PROCEDURE — 2580000003 HC RX 258: Performed by: INTERNAL MEDICINE

## 2024-05-13 PROCEDURE — 82962 GLUCOSE BLOOD TEST: CPT

## 2024-05-13 PROCEDURE — 94668 MNPJ CHEST WALL SBSQ: CPT

## 2024-05-13 PROCEDURE — 76770 US EXAM ABDO BACK WALL COMP: CPT

## 2024-05-13 PROCEDURE — 2060000000 HC ICU INTERMEDIATE R&B

## 2024-05-13 PROCEDURE — 36415 COLL VENOUS BLD VENIPUNCTURE: CPT

## 2024-05-13 PROCEDURE — 94640 AIRWAY INHALATION TREATMENT: CPT

## 2024-05-13 RX ORDER — SODIUM CHLORIDE 9 MG/ML
INJECTION, SOLUTION INTRAVENOUS CONTINUOUS
Status: DISCONTINUED | OUTPATIENT
Start: 2024-05-13 | End: 2024-05-15 | Stop reason: HOSPADM

## 2024-05-13 RX ORDER — HEPARIN SODIUM 5000 [USP'U]/ML
5000 INJECTION, SOLUTION INTRAVENOUS; SUBCUTANEOUS EVERY 8 HOURS SCHEDULED
Status: DISCONTINUED | OUTPATIENT
Start: 2024-05-14 | End: 2024-05-15 | Stop reason: HOSPADM

## 2024-05-13 RX ORDER — SODIUM CHLORIDE AND POTASSIUM CHLORIDE 150; 900 MG/100ML; MG/100ML
INJECTION, SOLUTION INTRAVENOUS CONTINUOUS
Status: DISCONTINUED | OUTPATIENT
Start: 2024-05-13 | End: 2024-05-13

## 2024-05-13 RX ADMIN — ACETYLCYSTEINE 1000 MG: 200 SOLUTION ORAL; RESPIRATORY (INHALATION) at 08:13

## 2024-05-13 RX ADMIN — ACETYLCYSTEINE 1000 MG: 200 SOLUTION ORAL; RESPIRATORY (INHALATION) at 13:47

## 2024-05-13 RX ADMIN — LEVOTHYROXINE SODIUM 50 MCG: 0.03 TABLET ORAL at 05:35

## 2024-05-13 RX ADMIN — POTASSIUM CHLORIDE AND SODIUM CHLORIDE: 900; 150 INJECTION, SOLUTION INTRAVENOUS at 17:26

## 2024-05-13 RX ADMIN — IPRATROPIUM BROMIDE AND ALBUTEROL SULFATE 1 DOSE: 2.5; .5 SOLUTION RESPIRATORY (INHALATION) at 08:13

## 2024-05-13 RX ADMIN — IPRATROPIUM BROMIDE AND ALBUTEROL SULFATE 1 DOSE: 2.5; .5 SOLUTION RESPIRATORY (INHALATION) at 13:47

## 2024-05-13 RX ADMIN — IPRATROPIUM BROMIDE AND ALBUTEROL SULFATE 1 DOSE: 2.5; .5 SOLUTION RESPIRATORY (INHALATION) at 20:26

## 2024-05-13 RX ADMIN — SODIUM CHLORIDE, PRESERVATIVE FREE 10 ML: 5 INJECTION INTRAVENOUS at 09:16

## 2024-05-13 RX ADMIN — ACETYLCYSTEINE 1000 MG: 200 SOLUTION ORAL; RESPIRATORY (INHALATION) at 20:26

## 2024-05-13 RX ADMIN — POTASSIUM CHLORIDE AND SODIUM CHLORIDE: 900; 150 INJECTION, SOLUTION INTRAVENOUS at 18:00

## 2024-05-13 RX ADMIN — ASPIRIN 81 MG 81 MG: 81 TABLET ORAL at 09:16

## 2024-05-13 RX ADMIN — SODIUM CHLORIDE: 9 INJECTION, SOLUTION INTRAVENOUS at 21:05

## 2024-05-13 RX ADMIN — INSULIN LISPRO 2 UNITS: 100 INJECTION, SOLUTION INTRAVENOUS; SUBCUTANEOUS at 17:27

## 2024-05-13 RX ADMIN — SPIRONOLACTONE 25 MG: 25 TABLET ORAL at 09:16

## 2024-05-13 RX ADMIN — CLOPIDOGREL BISULFATE 75 MG: 75 TABLET ORAL at 09:16

## 2024-05-13 RX ADMIN — SACUBITRIL AND VALSARTAN 1 TABLET: 24; 26 TABLET, FILM COATED ORAL at 09:16

## 2024-05-13 RX ADMIN — SODIUM CHLORIDE, PRESERVATIVE FREE 10 ML: 5 INJECTION INTRAVENOUS at 21:04

## 2024-05-13 RX ADMIN — INSULIN LISPRO 1 UNITS: 100 INJECTION, SOLUTION INTRAVENOUS; SUBCUTANEOUS at 09:24

## 2024-05-13 RX ADMIN — ENOXAPARIN SODIUM 40 MG: 100 INJECTION SUBCUTANEOUS at 09:16

## 2024-05-13 RX ADMIN — EMPAGLIFLOZIN 10 MG: 10 TABLET, FILM COATED ORAL at 09:16

## 2024-05-13 RX ADMIN — METOPROLOL SUCCINATE 25 MG: 25 TABLET, EXTENDED RELEASE ORAL at 09:16

## 2024-05-13 RX ADMIN — ROSUVASTATIN CALCIUM 20 MG: 20 TABLET, COATED ORAL at 21:04

## 2024-05-13 NOTE — CONSULTS
Nephrology Consultation          Patient: Kimber Morris MRN: [id number]  SSN: [id number]    YOB: 1945  Age: 78 y.o.  Sex: female      Subjective:   Reason for the consultation.  Acute kidney injury    History of present illness.  The patient is a 78-year-old woman with underlying history of diabetes mellitus type 2, hypertension, CHF with preserved LVEF presented with chest pain associated with nausea and sweating.  EKG showed A-fib with RVR requiring initially diltiazem drip and now switched to p.o. metoprolol.  CTA chest on 5/10 no evidence of PE.  Echocardiogram with preserved LVEF 50%.  The patient was started on Entresto and Aldactone also.  Her creatinine has bumped from 0.9->2.8 which prompted nephrology consultation.  She was also noticed to have episodes of significantly low blood pressures.  No lower extremity swelling.  She is on room air.  She was already started on IV fluids and her Entresto and Aldactone were appropriately held.    Past Medical History:   Diagnosis Date    Asthma     Chest discomfort     pt states off & on for few months with or without activity    CHF (congestive heart failure) (HCC)     DM (diabetes mellitus) (HCC)     GERD (gastroesophageal reflux disease)     HTN (hypertension)     Thyroid disease      Past Surgical History:   Procedure Laterality Date    HYSTERECTOMY (CERVIX STATUS UNKNOWN)      MOHS SURGERY        Family History   Problem Relation Age of Onset    Hypertension Mother     Diabetes Mother     Heart Disease Mother      Social History     Tobacco Use    Smoking status: Never    Smokeless tobacco: Never   Substance Use Topics    Alcohol use: No      Current Facility-Administered Medications   Medication Dose Route Frequency Provider Last Rate Last Admin    0.9% NaCl with KCl 20 mEq infusion   IntraVENous Continuous Bulmaro Joseph  mL/hr at 05/13/24 1800 New Bag at 05/13/24 1800    [START ON 5/14/2024] heparin (porcine) injection 5,000 
Pulmonary and Critical Care Consult    Subjective:   Consult Note: 5/11/2024 @no control      Chief Complaint:   Chief Complaint   Patient presents with    Back Pain    Atrial Fibrillation        This patient has been seen and evaluated at the request of Dr. Joseph    78-year-old -American lady  I'm asked to see for left lower lobe atelectasis.    Ex-smoker.  Half a pack a day for 60 years.  Quit smoking 2 years back.  Denies any known history of COPD    Patient does have a past medical history significant for CHF, DM, GERD, hypertension, thyroid disease     Presented to the hospital for evaluation of chest pain radiated to the back associated with diaphoresis and nausea, patient denies any trauma denies any fever any chills,     EKG showed A-fib with RVR, blood work was done showed no significant acute abnormality.      Currently continuing on low-dose diltiazem gtt.     Currently awaiting cardiology consultation and likely    CTA chest reviewed, showing:  No PE.  Partial left lower lobe collapse    Review of Systems:  Pertinent items are noted in HPI.    Past Medical History:   Diagnosis Date    Asthma     Chest discomfort     pt states off & on for few months with or without activity    CHF (congestive heart failure) (HCC)     DM (diabetes mellitus) (HCC)     GERD (gastroesophageal reflux disease)     HTN (hypertension)     Thyroid disease      Past Surgical History:   Procedure Laterality Date    HYSTERECTOMY (CERVIX STATUS UNKNOWN)      MOHS SURGERY        Family History   Problem Relation Age of Onset    Hypertension Mother     Diabetes Mother     Heart Disease Mother      Social History     Tobacco Use    Smoking status: Never    Smokeless tobacco: Never   Substance Use Topics    Alcohol use: No      Current Facility-Administered Medications   Medication Dose Route Frequency Provider Last Rate Last Admin    hydrALAZINE (APRESOLINE) tablet 25 mg  25 mg Oral TID Bulmaro Joseph MD        [id number] 125 
PM   Result Value Ref Range    Troponin, High Sensitivity 12 0 - 51 ng/L   Potassium    Collection Time: 05/10/24  1:42 PM   Result Value Ref Range    Potassium Hemolyzed, Recollection Recommended 3.5 - 5.1 mmol/L   Potassium    Collection Time: 05/10/24  4:51 PM   Result Value Ref Range    Potassium 3.6 3.5 - 5.1 mmol/L         Assessment:   Probably patient had atrial fibrillation with RVR to I did not see that rhythm strip and at this time she is in sinus rhythm with frequent PACs.  Nonischemic cardiomyopathy and chronic left ventricular systolic dysfunction with reduced ejection fraction.  Diabetes mellitus type 2.  Hypothyroidism.  Hypertension.          Plan:   Probably patient should be considered for anticoagulation for the atrial fibrillation.  I will check BNP.  Will consider Entresto and beta-blocker and Aldactone and Jardiance.  I will check serial EKG and enzymes.  Otherwise continue present care.  Thank you.        [x]        High complexity decision making was performed    Shane Hunter MD

## 2024-05-13 NOTE — CARE COORDINATION
Chart reviewed, DCP remains for patient to d/c from  to home with her , however  continues to monitor chart for PT/OT reccs.

## 2024-05-14 ENCOUNTER — APPOINTMENT (OUTPATIENT)
Facility: HOSPITAL | Age: 79
DRG: 309 | End: 2024-05-14
Payer: MEDICARE

## 2024-05-14 LAB
ALBUMIN SERPL-MCNC: 2.9 G/DL (ref 3.5–5)
ANION GAP SERPL CALC-SCNC: 5 MMOL/L (ref 5–15)
BUN SERPL-MCNC: 66 MG/DL (ref 6–20)
CA-I BLD-MCNC: 9 MG/DL (ref 8.5–10.1)
CHLORIDE SERPL-SCNC: 103 MMOL/L (ref 97–108)
CO2 SERPL-SCNC: 27 MMOL/L (ref 21–32)
CREAT SERPL-MCNC: 2.58 MG/DL (ref 0.55–1.02)
ERYTHROCYTE [DISTWIDTH] IN BLOOD BY AUTOMATED COUNT: 13.6 % (ref 11.5–14.5)
GLUCOSE BLD STRIP.AUTO-MCNC: 126 MG/DL (ref 65–100)
GLUCOSE BLD STRIP.AUTO-MCNC: 126 MG/DL (ref 65–100)
GLUCOSE BLD STRIP.AUTO-MCNC: 137 MG/DL (ref 65–100)
GLUCOSE BLD STRIP.AUTO-MCNC: 84 MG/DL (ref 65–100)
GLUCOSE SERPL-MCNC: 123 MG/DL (ref 65–100)
HCT VFR BLD AUTO: 43.7 % (ref 35–47)
HGB BLD-MCNC: 14.6 G/DL (ref 11.5–16)
MCH RBC QN AUTO: 31.6 PG (ref 26–34)
MCHC RBC AUTO-ENTMCNC: 33.4 G/DL (ref 30–36.5)
MCV RBC AUTO: 94.6 FL (ref 80–99)
NRBC # BLD: 0 K/UL (ref 0–0.01)
NRBC BLD-RTO: 0 PER 100 WBC
PERFORMED BY:: ABNORMAL
PERFORMED BY:: NORMAL
PHOSPHATE SERPL-MCNC: 2.7 MG/DL (ref 2.6–4.7)
PLATELET # BLD AUTO: 230 K/UL (ref 150–400)
PMV BLD AUTO: 9.6 FL (ref 8.9–12.9)
POTASSIUM SERPL-SCNC: 3.7 MMOL/L (ref 3.5–5.1)
SODIUM SERPL-SCNC: 135 MMOL/L (ref 136–145)
WBC # BLD AUTO: 10.9 K/UL (ref 3.6–11)

## 2024-05-14 PROCEDURE — 6360000002 HC RX W HCPCS: Performed by: INTERNAL MEDICINE

## 2024-05-14 PROCEDURE — 82962 GLUCOSE BLOOD TEST: CPT

## 2024-05-14 PROCEDURE — 80069 RENAL FUNCTION PANEL: CPT

## 2024-05-14 PROCEDURE — 97530 THERAPEUTIC ACTIVITIES: CPT

## 2024-05-14 PROCEDURE — 2580000003 HC RX 258: Performed by: INTERNAL MEDICINE

## 2024-05-14 PROCEDURE — 71046 X-RAY EXAM CHEST 2 VIEWS: CPT

## 2024-05-14 PROCEDURE — 85027 COMPLETE CBC AUTOMATED: CPT

## 2024-05-14 PROCEDURE — 94640 AIRWAY INHALATION TREATMENT: CPT

## 2024-05-14 PROCEDURE — 6370000000 HC RX 637 (ALT 250 FOR IP): Performed by: INTERNAL MEDICINE

## 2024-05-14 PROCEDURE — 94668 MNPJ CHEST WALL SBSQ: CPT

## 2024-05-14 PROCEDURE — 97161 PT EVAL LOW COMPLEX 20 MIN: CPT

## 2024-05-14 PROCEDURE — 36415 COLL VENOUS BLD VENIPUNCTURE: CPT

## 2024-05-14 PROCEDURE — 2060000000 HC ICU INTERMEDIATE R&B

## 2024-05-14 RX ORDER — MIDODRINE HYDROCHLORIDE 5 MG/1
5 TABLET ORAL
Status: DISCONTINUED | OUTPATIENT
Start: 2024-05-14 | End: 2024-05-15 | Stop reason: HOSPADM

## 2024-05-14 RX ADMIN — ACETYLCYSTEINE 1000 MG: 200 SOLUTION ORAL; RESPIRATORY (INHALATION) at 07:39

## 2024-05-14 RX ADMIN — IPRATROPIUM BROMIDE AND ALBUTEROL SULFATE 1 DOSE: 2.5; .5 SOLUTION RESPIRATORY (INHALATION) at 14:02

## 2024-05-14 RX ADMIN — HEPARIN SODIUM 5000 UNITS: 5000 INJECTION INTRAVENOUS; SUBCUTANEOUS at 06:12

## 2024-05-14 RX ADMIN — SODIUM CHLORIDE, PRESERVATIVE FREE 10 ML: 5 INJECTION INTRAVENOUS at 22:23

## 2024-05-14 RX ADMIN — CLOPIDOGREL BISULFATE 75 MG: 75 TABLET ORAL at 08:46

## 2024-05-14 RX ADMIN — SODIUM CHLORIDE: 9 INJECTION, SOLUTION INTRAVENOUS at 04:45

## 2024-05-14 RX ADMIN — IPRATROPIUM BROMIDE AND ALBUTEROL SULFATE 1 DOSE: 2.5; .5 SOLUTION RESPIRATORY (INHALATION) at 07:39

## 2024-05-14 RX ADMIN — LEVOTHYROXINE SODIUM 50 MCG: 0.03 TABLET ORAL at 06:12

## 2024-05-14 RX ADMIN — ROSUVASTATIN CALCIUM 20 MG: 20 TABLET, COATED ORAL at 22:22

## 2024-05-14 RX ADMIN — SODIUM CHLORIDE, PRESERVATIVE FREE 10 ML: 5 INJECTION INTRAVENOUS at 08:46

## 2024-05-14 RX ADMIN — SODIUM CHLORIDE: 9 INJECTION, SOLUTION INTRAVENOUS at 16:10

## 2024-05-14 RX ADMIN — ASPIRIN 81 MG 81 MG: 81 TABLET ORAL at 08:46

## 2024-05-14 RX ADMIN — ACETYLCYSTEINE 1000 MG: 200 SOLUTION ORAL; RESPIRATORY (INHALATION) at 14:02

## 2024-05-14 RX ADMIN — METOPROLOL SUCCINATE 25 MG: 25 TABLET, EXTENDED RELEASE ORAL at 08:46

## 2024-05-14 RX ADMIN — HEPARIN SODIUM 5000 UNITS: 5000 INJECTION INTRAVENOUS; SUBCUTANEOUS at 22:22

## 2024-05-14 RX ADMIN — HEPARIN SODIUM 5000 UNITS: 5000 INJECTION INTRAVENOUS; SUBCUTANEOUS at 13:32

## 2024-05-14 ASSESSMENT — PAIN SCALES - GENERAL: PAINLEVEL_OUTOF10: 0

## 2024-05-15 VITALS
SYSTOLIC BLOOD PRESSURE: 105 MMHG | DIASTOLIC BLOOD PRESSURE: 86 MMHG | TEMPERATURE: 97.2 F | BODY MASS INDEX: 23.41 KG/M2 | RESPIRATION RATE: 20 BRPM | HEART RATE: 82 BPM | OXYGEN SATURATION: 96 % | HEIGHT: 62 IN | WEIGHT: 127.2 LBS

## 2024-05-15 LAB
ALBUMIN SERPL-MCNC: 2.9 G/DL (ref 3.5–5)
ANION GAP SERPL CALC-SCNC: 4 MMOL/L (ref 5–15)
BUN SERPL-MCNC: 45 MG/DL (ref 6–20)
BUN/CREAT SERPL: 30 (ref 12–20)
CA-I BLD-MCNC: 9.4 MG/DL (ref 8.5–10.1)
CHLORIDE SERPL-SCNC: 110 MMOL/L (ref 97–108)
CO2 SERPL-SCNC: 26 MMOL/L (ref 21–32)
CREAT SERPL-MCNC: 1.49 MG/DL (ref 0.55–1.02)
GLUCOSE BLD STRIP.AUTO-MCNC: 110 MG/DL (ref 65–100)
GLUCOSE BLD STRIP.AUTO-MCNC: 81 MG/DL (ref 65–100)
GLUCOSE SERPL-MCNC: 113 MG/DL (ref 65–100)
PERFORMED BY:: ABNORMAL
PERFORMED BY:: NORMAL
PHOSPHATE SERPL-MCNC: 1.8 MG/DL (ref 2.6–4.7)
POTASSIUM SERPL-SCNC: 4 MMOL/L (ref 3.5–5.1)
SODIUM SERPL-SCNC: 140 MMOL/L (ref 136–145)

## 2024-05-15 PROCEDURE — 82962 GLUCOSE BLOOD TEST: CPT

## 2024-05-15 PROCEDURE — 36415 COLL VENOUS BLD VENIPUNCTURE: CPT

## 2024-05-15 PROCEDURE — 80069 RENAL FUNCTION PANEL: CPT

## 2024-05-15 PROCEDURE — 97530 THERAPEUTIC ACTIVITIES: CPT

## 2024-05-15 PROCEDURE — 6370000000 HC RX 637 (ALT 250 FOR IP): Performed by: INTERNAL MEDICINE

## 2024-05-15 PROCEDURE — 6360000002 HC RX W HCPCS: Performed by: FAMILY MEDICINE

## 2024-05-15 PROCEDURE — 94761 N-INVAS EAR/PLS OXIMETRY MLT: CPT

## 2024-05-15 PROCEDURE — 6360000002 HC RX W HCPCS: Performed by: INTERNAL MEDICINE

## 2024-05-15 RX ORDER — METOPROLOL SUCCINATE 25 MG/1
25 TABLET, EXTENDED RELEASE ORAL DAILY
Qty: 30 TABLET | Refills: 0 | Status: SHIPPED | OUTPATIENT
Start: 2024-05-16

## 2024-05-15 RX ORDER — MIDODRINE HYDROCHLORIDE 5 MG/1
5 TABLET ORAL
Qty: 90 TABLET | Refills: 0 | Status: SHIPPED | OUTPATIENT
Start: 2024-05-15

## 2024-05-15 RX ORDER — METOPROLOL SUCCINATE 25 MG/1
25 TABLET, EXTENDED RELEASE ORAL DAILY
Qty: 30 TABLET | Refills: 3 | Status: SHIPPED | OUTPATIENT
Start: 2024-05-16 | End: 2024-05-15

## 2024-05-15 RX ORDER — ROSUVASTATIN CALCIUM 20 MG/1
20 TABLET, COATED ORAL NIGHTLY
Qty: 30 TABLET | Refills: 3 | Status: SHIPPED | OUTPATIENT
Start: 2024-05-15 | End: 2024-05-15

## 2024-05-15 RX ORDER — ROSUVASTATIN CALCIUM 20 MG/1
20 TABLET, COATED ORAL NIGHTLY
Qty: 30 TABLET | Refills: 0 | Status: SHIPPED | OUTPATIENT
Start: 2024-05-15

## 2024-05-15 RX ORDER — HALOPERIDOL 5 MG/ML
2 INJECTION INTRAMUSCULAR ONCE
Status: COMPLETED | OUTPATIENT
Start: 2024-05-15 | End: 2024-05-15

## 2024-05-15 RX ORDER — MIDODRINE HYDROCHLORIDE 5 MG/1
5 TABLET ORAL
Qty: 90 TABLET | Refills: 3 | Status: SHIPPED | OUTPATIENT
Start: 2024-05-15 | End: 2024-05-15

## 2024-05-15 RX ADMIN — METOPROLOL SUCCINATE 25 MG: 25 TABLET, EXTENDED RELEASE ORAL at 09:34

## 2024-05-15 RX ADMIN — ASPIRIN 81 MG 81 MG: 81 TABLET ORAL at 09:34

## 2024-05-15 RX ADMIN — HYDRALAZINE HYDROCHLORIDE 25 MG: 50 TABLET ORAL at 09:34

## 2024-05-15 RX ADMIN — MIDODRINE HYDROCHLORIDE 5 MG: 5 TABLET ORAL at 11:36

## 2024-05-15 RX ADMIN — LEVOTHYROXINE SODIUM 50 MCG: 0.03 TABLET ORAL at 05:36

## 2024-05-15 RX ADMIN — CLOPIDOGREL BISULFATE 75 MG: 75 TABLET ORAL at 09:35

## 2024-05-15 RX ADMIN — HEPARIN SODIUM 5000 UNITS: 5000 INJECTION INTRAVENOUS; SUBCUTANEOUS at 05:36

## 2024-05-15 RX ADMIN — HALOPERIDOL LACTATE 2 MG: 5 INJECTION, SOLUTION INTRAMUSCULAR at 10:28

## 2024-05-15 NOTE — PLAN OF CARE
Problem: Discharge Planning  Goal: Discharge to home or other facility with appropriate resources  5/11/2024 0953 by Tye Parnell, RN  Outcome: Progressing  5/11/2024 0103 by Arina Howard RN  Outcome: Progressing     Problem: Skin/Tissue Integrity  Goal: Absence of new skin breakdown  Description: 1.  Monitor for areas of redness and/or skin breakdown  2.  Assess vascular access sites hourly  3.  Every 4-6 hours minimum:  Change oxygen saturation probe site  4.  Every 4-6 hours:  If on nasal continuous positive airway pressure, respiratory therapy assess nares and determine need for appliance change or resting period.  5/11/2024 0953 by Tye Parnell, RN  Outcome: Progressing  5/11/2024 0103 by Arina Howard, RN  Outcome: Progressing  5/11/2024 0100 by Arina Howard, RN  Outcome: Progressing     
  Problem: Discharge Planning  Goal: Discharge to home or other facility with appropriate resources  5/11/2024 2213 by Jazmine Arriaga RN  Outcome: Progressing  5/11/2024 0953 by Tye Parnell RN  Outcome: Progressing     Problem: Pain  Goal: Verbalizes/displays adequate comfort level or baseline comfort level  Outcome: Progressing     Problem: Skin/Tissue Integrity  Goal: Absence of new skin breakdown  Description: 1.  Monitor for areas of redness and/or skin breakdown  2.  Assess vascular access sites hourly  3.  Every 4-6 hours minimum:  Change oxygen saturation probe site  4.  Every 4-6 hours:  If on nasal continuous positive airway pressure, respiratory therapy assess nares and determine need for appliance change or resting period.  5/11/2024 2213 by Jazmine Arriaga RN  Outcome: Progressing  5/11/2024 0953 by Tye Parnell RN  Outcome: Progressing     Problem: ABCDS Injury Assessment  Goal: Absence of physical injury  Outcome: Progressing     Problem: Safety - Adult  Goal: Free from fall injury  Outcome: Progressing     Problem: Respiratory - Adult  Goal: Achieves optimal ventilation and oxygenation  Outcome: Progressing     Problem: Skin/Tissue Integrity - Adult  Goal: Skin integrity remains intact  5/11/2024 2213 by Jazmine Arriaga RN  Outcome: Progressing  5/11/2024 0953 by Tye Parnell RN  Outcome: Progressing     Problem: Metabolic/Fluid and Electrolytes - Adult  Goal: Electrolytes maintained within normal limits  Outcome: Progressing     Problem: Chronic Conditions and Co-morbidities  Goal: Patient's chronic conditions and co-morbidity symptoms are monitored and maintained or improved  Outcome: Progressing     
  Problem: Discharge Planning  Goal: Discharge to home or other facility with appropriate resources  5/12/2024 0816 by Sarah Henriquez LPN  Outcome: Progressing  5/11/2024 2213 by Jazmine Arriaga RN  Outcome: Progressing  Flowsheets (Taken 5/11/2024 2032)  Discharge to home or other facility with appropriate resources: Identify barriers to discharge with patient and caregiver     Problem: Pain  Goal: Verbalizes/displays adequate comfort level or baseline comfort level  5/12/2024 0816 by Sarah Henriquez LPN  Outcome: Progressing  5/11/2024 2213 by Jazmine Arriaga RN  Outcome: Progressing     Problem: Skin/Tissue Integrity  Goal: Absence of new skin breakdown  Description: 1.  Monitor for areas of redness and/or skin breakdown  2.  Assess vascular access sites hourly  3.  Every 4-6 hours minimum:  Change oxygen saturation probe site  4.  Every 4-6 hours:  If on nasal continuous positive airway pressure, respiratory therapy assess nares and determine need for appliance change or resting period.  5/12/2024 0816 by Sarah Henriquez LPN  Outcome: Progressing  5/11/2024 2213 by Jazmine Arriaga RN  Outcome: Progressing     Problem: ABCDS Injury Assessment  Goal: Absence of physical injury  5/12/2024 0816 by Sarah Henriquez LPN  Outcome: Progressing  5/11/2024 2213 by Jazmine Arriaga RN  Outcome: Progressing     Problem: Safety - Adult  Goal: Free from fall injury  5/12/2024 0816 by Sarah Henriquez LPN  Outcome: Progressing  5/11/2024 2213 by Jazmine Arriaga RN  Outcome: Progressing     Problem: Neurosensory - Adult  Goal: Achieves stable or improved neurological status  Outcome: Progressing  Flowsheets (Taken 5/11/2024 2032 by Jazmine Arriaga RN)  Achieves stable or improved neurological status: Assess for and report changes in neurological status     Problem: Respiratory - Adult  Goal: Achieves optimal ventilation and oxygenation  5/12/2024 0816 by Sarah Henriquez LPN  Outcome: Progressing  5/11/2024 2213 
  Problem: Discharge Planning  Goal: Discharge to home or other facility with appropriate resources  5/12/2024 1959 by Jazmine Arriaga RN  Outcome: Progressing  5/12/2024 0816 by Sarah Henriquez LPN  Outcome: Progressing     Problem: Pain  Goal: Verbalizes/displays adequate comfort level or baseline comfort level  5/12/2024 1959 by Jazmine Arriaga RN  Outcome: Progressing  5/12/2024 0816 by Sarah Henriquez LPN  Outcome: Progressing     Problem: Skin/Tissue Integrity  Goal: Absence of new skin breakdown  Description: 1.  Monitor for areas of redness and/or skin breakdown  2.  Assess vascular access sites hourly  3.  Every 4-6 hours minimum:  Change oxygen saturation probe site  4.  Every 4-6 hours:  If on nasal continuous positive airway pressure, respiratory therapy assess nares and determine need for appliance change or resting period.  5/12/2024 1959 by Jazmine Arriaga RN  Outcome: Progressing  5/12/2024 0816 by aSrah Henriquez LPN  Outcome: Progressing     Problem: ABCDS Injury Assessment  Goal: Absence of physical injury  5/12/2024 1959 by Jazmine Arriaga RN  Outcome: Progressing  5/12/2024 0816 by Sarah Henriquez LPN  Outcome: Progressing     Problem: Safety - Adult  Goal: Free from fall injury  5/12/2024 1959 by Jazmine Arriaga RN  Outcome: Progressing  5/12/2024 0816 by Sarah Henriquez LPN  Outcome: Progressing     Problem: Neurosensory - Adult  Goal: Achieves stable or improved neurological status  5/12/2024 1959 by Jazmine Arriaga RN  Outcome: Progressing  5/12/2024 0816 by Sarah Henriquez LPN  Outcome: Progressing  Flowsheets (Taken 5/11/2024 2032 by Jazmine Arriaga RN)  Achieves stable or improved neurological status: Assess for and report changes in neurological status     Problem: Respiratory - Adult  Goal: Achieves optimal ventilation and oxygenation  5/12/2024 1959 by Jazmine Arriaga RN  Outcome: Progressing  5/12/2024 0816 by Sarah Henriquez LPN  Outcome: Progressing   
  Problem: Discharge Planning  Goal: Discharge to home or other facility with appropriate resources  Outcome: Progressing     Problem: Pain  Goal: Verbalizes/displays adequate comfort level or baseline comfort level  Outcome: Progressing     Problem: Skin/Tissue Integrity  Goal: Absence of new skin breakdown  Description: 1.  Monitor for areas of redness and/or skin breakdown  2.  Assess vascular access sites hourly  3.  Every 4-6 hours minimum:  Change oxygen saturation probe site  4.  Every 4-6 hours:  If on nasal continuous positive airway pressure, respiratory therapy assess nares and determine need for appliance change or resting period.  Outcome: Progressing     Problem: ABCDS Injury Assessment  Goal: Absence of physical injury  Outcome: Progressing     Problem: Safety - Adult  Goal: Free from fall injury  Outcome: Progressing     Problem: Neurosensory - Adult  Goal: Achieves stable or improved neurological status  Outcome: Progressing     Problem: Respiratory - Adult  Goal: Achieves optimal ventilation and oxygenation  Outcome: Progressing     Problem: Skin/Tissue Integrity - Adult  Goal: Skin integrity remains intact  Outcome: Progressing     Problem: Metabolic/Fluid and Electrolytes - Adult  Goal: Electrolytes maintained within normal limits  Outcome: Progressing     Problem: Chronic Conditions and Co-morbidities  Goal: Patient's chronic conditions and co-morbidity symptoms are monitored and maintained or improved  Outcome: Progressing     
  Problem: Discharge Planning  Goal: Discharge to home or other facility with appropriate resources  Outcome: Progressing  Flowsheets (Taken 5/13/2024 1955)  Discharge to home or other facility with appropriate resources:   Identify barriers to discharge with patient and caregiver   Arrange for needed discharge resources and transportation as appropriate   Identify discharge learning needs (meds, wound care, etc)   Arrange for interpreters to assist at discharge as needed   Refer to discharge planning if patient needs post-hospital services based on physician order or complex needs related to functional status, cognitive ability or social support system     Problem: Pain  Goal: Verbalizes/displays adequate comfort level or baseline comfort level  Outcome: Progressing     Problem: Skin/Tissue Integrity  Goal: Absence of new skin breakdown  Description: 1.  Monitor for areas of redness and/or skin breakdown  2.  Assess vascular access sites hourly  3.  Every 4-6 hours minimum:  Change oxygen saturation probe site  4.  Every 4-6 hours:  If on nasal continuous positive airway pressure, respiratory therapy assess nares and determine need for appliance change or resting period.  Outcome: Progressing     Problem: ABCDS Injury Assessment  Goal: Absence of physical injury  Outcome: Progressing     Problem: Safety - Adult  Goal: Free from fall injury  Outcome: Progressing     Problem: Neurosensory - Adult  Goal: Achieves stable or improved neurological status  Outcome: Progressing     Problem: Respiratory - Adult  Goal: Achieves optimal ventilation and oxygenation  Outcome: Progressing  Flowsheets (Taken 5/13/2024 1955)  Achieves optimal ventilation and oxygenation:   Assess for changes in respiratory status   Assess for changes in mentation and behavior   Position to facilitate oxygenation and minimize respiratory effort     Problem: Skin/Tissue Integrity - Adult  Goal: Skin integrity remains intact  Outcome: 
  Problem: Discharge Planning  Goal: Discharge to home or other facility with appropriate resources  Outcome: Progressing  Flowsheets (Taken 5/14/2024 2055)  Discharge to home or other facility with appropriate resources:   Identify barriers to discharge with patient and caregiver   Arrange for needed discharge resources and transportation as appropriate   Identify discharge learning needs (meds, wound care, etc)   Arrange for interpreters to assist at discharge as needed   Refer to discharge planning if patient needs post-hospital services based on physician order or complex needs related to functional status, cognitive ability or social support system     Problem: Pain  Goal: Verbalizes/displays adequate comfort level or baseline comfort level  Outcome: Progressing     Problem: Skin/Tissue Integrity  Goal: Absence of new skin breakdown  Description: 1.  Monitor for areas of redness and/or skin breakdown  2.  Assess vascular access sites hourly  3.  Every 4-6 hours minimum:  Change oxygen saturation probe site  4.  Every 4-6 hours:  If on nasal continuous positive airway pressure, respiratory therapy assess nares and determine need for appliance change or resting period.  Outcome: Progressing     Problem: ABCDS Injury Assessment  Goal: Absence of physical injury  Outcome: Progressing     Problem: Safety - Adult  Goal: Free from fall injury  Outcome: Progressing     Problem: Neurosensory - Adult  Goal: Achieves stable or improved neurological status  Outcome: Progressing  Flowsheets (Taken 5/14/2024 2055)  Achieves stable or improved neurological status:   Assess for and report changes in neurological status   Initiate measures to prevent increased intracranial pressure   Maintain blood pressure and fluid volume within ordered parameters to optimize cerebral perfusion and minimize risk of hemorrhage   Monitor temperature, glucose, and sodium. Initiate appropriate interventions as ordered     Problem: Respiratory - 
  Problem: Nutrition Deficit:  Goal: Optimize nutritional status  Outcome: Progressing  Flowsheets (Taken 5/13/2024 1521)  Nutrient intake appropriate for improving, restoring, or maintaining nutritional needs:   Assess nutritional status and recommend course of action   Monitor oral intake, labs, and treatment plans   Recommend appropriate diets, oral nutritional supplements, and vitamin/mineral supplements     
  Problem: Skin/Tissue Integrity  Goal: Absence of new skin breakdown  Description: 1.  Monitor for areas of redness and/or skin breakdown  2.  Assess vascular access sites hourly  3.  Every 4-6 hours minimum:  Change oxygen saturation probe site  4.  Every 4-6 hours:  If on nasal continuous positive airway pressure, respiratory therapy assess nares and determine need for appliance change or resting period.  Outcome: Progressing     Problem: ABCDS Injury Assessment  Goal: Absence of physical injury  Outcome: Progressing     Problem: Safety - Adult  Goal: Free from fall injury  Outcome: Progressing     
PHYSICAL THERAPY EVALUATION  Patient: Kimber Morris (78 y.o. female)  Date: 5/14/2024  Primary Diagnosis: Chest pain [R07.9]  Atrial fibrillation, unspecified type (HCC) [I48.91]       Precautions: Bed Alarm, General Precautions, Fall Risk                      Recommendations for nursing mobility: Out of bed to chair for meals, Encourage HEP in prep for ADLs/mobility; see handout for details, Use of bed/chair alarm for safety, AD and gt belt for bed to chair , Amb to bathroom with AD and gait belt, and Assist x1    In place during session: Peripheral IV and EKG/telemetry     ASSESSMENT  Pt is a 78 y.o. female admitted on 5/10/2024 for radiating chest pain; PMHx CHF, DM, GERD, HTN, thyroid disease; pt currently being treated for chest pain . Pt semi supine upon PT arrival, agreeable to evaluation. Pt A&O x 2, disoriented to year and situation.  Pt's granddaughter entered room mid-session and remained with pt permission.    Based on the objective data described below, the patient currently presents with impaired functional mobility, decreased independence in ADLs, impaired strength, decreased activity tolerance, and impaired balance. (See below for objective details and assist levels).     Overall pt tolerated session good today with no c/o pain throughout session. Pt received seated EOB with nsg. Pt required CGA for all functional transfers for increased safety and VC for hand placement on grab bars. Pt amb 30 feet, 10' with HHA and remaining 20' with RW, gt belt and CGA; demonstrates narrow BRIEN, shuffled gait pattern with decreased step and clearance bilaterally, increased trunk sway, improved with BUE support and use of RW. Pt denies chest pain with activity. Pt will benefit from continued skilled PT to address above deficits and return to PLOF. Potential barriers for safe discharge: pt is a high fall risk and pt is not safe to be alone. Current PT DC recommendation Home with Home Health Therapy once medically 
care.     Start of Session End of Session   SPO2 (%) 94 with 150' amb    Heart Rate (BPM) 92 with 150'           PLAN :  Recommendations and Planned Interventions: DC from skilled PT services following treatment session.     Rationale for discharge: Patient currently at functional baseline for transfers/mobility, no further skilled therapy required at this time    Frequency/Duration: Patient will be followed by physical therapy: DC from PT services following treatment session due to Patient currently at functional baseline for transfers/mobility, no further skilled therapy required at this time; no further skilled therapy required during admission, please reorder if needed/status changes    Recommendation for discharge: (in order for the patient to meet his/her long term goals)   Home with Home Health Therapy    IF patient discharges home will need the following DME:  none         SUBJECTIVE:   Patient stated “They're in a closet by the side door.”    OBJECTIVE DATA SUMMARY:     Past Medical History:   Diagnosis Date    Asthma     Chest discomfort     pt states off & on for few months with or without activity    CHF (congestive heart failure) (HCC)     DM (diabetes mellitus) (HCC)     GERD (gastroesophageal reflux disease)     HTN (hypertension)     Thyroid disease      Past Surgical History:   Procedure Laterality Date    HYSTERECTOMY (CERVIX STATUS UNKNOWN)      MOHS SURGERY         Critical Behavior:          Strength:         Tone & Sensation:           Range Of Motion:          Functional Mobility:  Bed Mobility:     Bed Mobility Training  Bed Mobility Training: No  Transfers:     Transfer Training  Transfer Training: Yes  Sit to Stand: Stand-by assistance  Stand to Sit: Stand-by assistance  Balance:               Balance  Sitting: Intact  Standing: Impaired  Standing - Static: Good  Standing - Dynamic: Fair;Occasional  Wheelchair Mobility:        Ambulation/Gait Training:

## 2024-05-15 NOTE — PROGRESS NOTES
Nephrology follow-up          Patient: Kimber Morris MRN: [id number]  SSN: [id number]    YOB: 1945  Age: 78 y.o.  Sex: female      Subjective:   I have seen the patient at the bedside  She looks comfortable  Resolving ERUM  Blood pressures are better  On room air  No lower extremity swelling  On IV fluids      Past Medical History:   Diagnosis Date    Asthma     Chest discomfort     pt states off & on for few months with or without activity    CHF (congestive heart failure) (HCC)     DM (diabetes mellitus) (HCC)     GERD (gastroesophageal reflux disease)     HTN (hypertension)     Thyroid disease      Past Surgical History:   Procedure Laterality Date    HYSTERECTOMY (CERVIX STATUS UNKNOWN)      MOHS SURGERY        Family History   Problem Relation Age of Onset    Hypertension Mother     Diabetes Mother     Heart Disease Mother      Social History     Tobacco Use    Smoking status: Never    Smokeless tobacco: Never   Substance Use Topics    Alcohol use: No      No current facility-administered medications for this encounter.     Current Outpatient Medications   Medication Sig Dispense Refill    empagliflozin (JARDIANCE) 10 MG tablet Take 1 tablet by mouth daily 30 tablet 3    rosuvastatin (CRESTOR) 20 MG tablet Take 1 tablet by mouth nightly 30 tablet 0    [START ON 5/16/2024] metoprolol succinate (TOPROL XL) 25 MG extended release tablet Take 1 tablet by mouth daily 30 tablet 0    sacubitril-valsartan (ENTRESTO) 24-26 MG per tablet Take 1 tablet by mouth 2 times daily 60 tablet 0    midodrine (PROAMATINE) 5 MG tablet Take 1 tablet by mouth 3 times daily (with meals) 90 tablet 0    albuterol sulfate HFA (VENTOLIN HFA) 108 (90 Base) MCG/ACT inhaler Inhale 2 puffs into the lungs as needed      aspirin 81 MG EC tablet Take 1 tablet by mouth daily      clopidogrel (PLAVIX) 75 MG tablet Take 1 tablet by mouth daily      hydrALAZINE (APRESOLINE) 25 MG tablet Take 1 tablet by mouth 3 times daily      
         Nephrology follow-up          Patient: Kimber Morris MRN: [id number]  SSN: [id number]    YOB: 1945  Age: 78 y.o.  Sex: female      Subjective:   I have seen the patient at the bedside  She looks comfortable  Resolving ERUM  Blood pressures are lowish  On room air  No lower extremity swelling  On IV fluids      Past Medical History:   Diagnosis Date    Asthma     Chest discomfort     pt states off & on for few months with or without activity    CHF (congestive heart failure) (HCC)     DM (diabetes mellitus) (HCC)     GERD (gastroesophageal reflux disease)     HTN (hypertension)     Thyroid disease      Past Surgical History:   Procedure Laterality Date    HYSTERECTOMY (CERVIX STATUS UNKNOWN)      MOHS SURGERY        Family History   Problem Relation Age of Onset    Hypertension Mother     Diabetes Mother     Heart Disease Mother      Social History     Tobacco Use    Smoking status: Never    Smokeless tobacco: Never   Substance Use Topics    Alcohol use: No      Current Facility-Administered Medications   Medication Dose Route Frequency Provider Last Rate Last Admin    heparin (porcine) injection 5,000 Units  5,000 Units SubCUTAneous 3 times per day Thaddeus Jones MD   5,000 Units at 05/14/24 1332    0.9 % sodium chloride infusion   IntraVENous Continuous Thaddeus Jones  mL/hr at 05/14/24 1610 New Bag at 05/14/24 1610    ipratropium 0.5 mg-albuterol 2.5 mg (DUONEB) nebulizer solution 1 Dose  1 Dose Inhalation TID RT Omid Linn MD   1 Dose at 05/14/24 1402    acetylcysteine (MUCOMYST) 20 % solution 1,000 mg  1,000 mg Inhalation TID RT Omid Linn MD   1,000 mg at 05/14/24 1402    hydrALAZINE (APRESOLINE) tablet 25 mg  25 mg Oral TID Bulmaro Joseph MD   25 mg at 05/12/24 1003    sodium chloride flush 0.9 % injection 5-40 mL  5-40 mL IntraVENous 2 times per day Randy Fuchs MD   10 mL at 05/14/24 0846    sodium chloride flush 0.9 % injection 5-40 mL  5-40 mL IntraVENous PRN 
        Progress Note      5/12/2024 11:29 AM  NAME: Kimber Morris   MRN:[id number]   Admit Diagnosis: Chest pain [R07.9]  Atrial fibrillation, unspecified type (HCC) [I48.91]      Subjective:   Chart reviewed.  Patient examined.  Echocardiographic results discussed.  Patient is feeling much better.  Denied any chest tightness or shortness of breath.        Review of Systems:    Symptom Y/N Comments  Symptom Y/N Comments   Fever/Chills n   Chest Pain n    Poor Appetite    Edema     Cough    Abdominal Pain n    Sputum    Joint Pain     SOB/STEPHENS n   Pruritis/Rash     Nausea/vomit    Other     Diarrhea         Constipation           Could NOT obtain due to:      Objective:          Physical Exam:    Last 24hrs VS reviewed since prior progress note. Most recent are:    BP 90/60   Pulse 89   Temp 98.1 °F (36.7 °C) (Oral)   Resp 22   Ht 1.575 m (5' 2\")   Wt 52.9 kg (116 lb 11.2 oz)   SpO2 95%   BMI 21.34 kg/m²     Intake/Output Summary (Last 24 hours) at 5/12/2024 1129  Last data filed at 5/11/2024 2300  Gross per 24 hour   Intake 210 ml   Output --   Net 210 ml          General Appearance: Well developed, well nourished, alert & oriented x 3,    no acute distress.  Ears/Nose/Mouth/Throat: Hearing grossly normal.  Neck: Supple.  Chest: Lungs clear to auscultation bilaterally.  Cardiovascular: Regular rate and rhythm, S1,S2 normal, no murmur.  Abdomen: Soft, non-tender, bowel sounds are active.  Extremities: No edema bilaterally.  Skin: Warm and dry.    []         Post-cath site without hematoma, bruit, tenderness, or thrill.  Distal pulses intact.    PMH/SH reviewed - no change compared to H&P    Data Review    Telemetry: normal sinus rhythm, frequent PACs.    EKG:   []  No new EKG for review    Lab Data Personally Reviewed:    Recent Labs     05/11/24  0220 05/12/24  0449   WBC 9.9 13.9*   HGB 15.2 16.6*   HCT 44.9 49.0*    309       Recent Labs     05/10/24  2000   INR 0.9   PROTIME 12.8   APTT 32.4      
        Progress Note      5/14/2024 12:34 PM  NAME: Kimebr Morris   MRN:[id number]   Admit Diagnosis: Chest pain [R07.9]  Atrial fibrillation, unspecified type (HCC) [I48.91]      Subjective:   Chart reviewed.  Patient examined.  Echocardiographic results discussed.  Patient is feeling much better.  Denied any chest tightness or shortness of breath.    Nephrology note appreciated.        Review of Systems:    Symptom Y/N Comments  Symptom Y/N Comments   Fever/Chills n   Chest Pain n    Poor Appetite    Edema     Cough    Abdominal Pain n    Sputum    Joint Pain     SOB/STEPHENS n   Pruritis/Rash     Nausea/vomit    Other     Diarrhea         Constipation           Could NOT obtain due to:      Objective:          Physical Exam:    Last 24hrs VS reviewed since prior progress note. Most recent are:    BP 94/60   Pulse 87   Temp 98.2 °F (36.8 °C) (Oral)   Resp 20   Ht 1.575 m (5' 2.01\")   Wt 55.7 kg (122 lb 11.2 oz)   SpO2 95%   BMI 22.44 kg/m²     Intake/Output Summary (Last 24 hours) at 5/14/2024 1234  Last data filed at 5/14/2024 1220  Gross per 24 hour   Intake --   Output 1250 ml   Net -1250 ml          General Appearance: Well developed, well nourished, alert & oriented x 3,    no acute distress.  Ears/Nose/Mouth/Throat: Hearing grossly normal.  Neck: Supple.  Chest: Lungs clear to auscultation bilaterally.  Cardiovascular: Regular rate and rhythm, S1,S2 normal, no murmur.  Abdomen: Soft, non-tender, bowel sounds are active.  Extremities: No edema bilaterally.  Skin: Warm and dry.    []         Post-cath site without hematoma, bruit, tenderness, or thrill.  Distal pulses intact.    PMH/SH reviewed - no change compared to H&P    Data Review    Telemetry: normal sinus rhythm, frequent PACs.    EKG:   []  No new EKG for review    Lab Data Personally Reviewed:    Recent Labs     05/12/24  0449 05/14/24  0245   WBC 13.9* 10.9   HGB 16.6* 14.6   HCT 49.0* 43.7    230       No results for input(s): \"INR\", 
      Hospitalist Progress Note    NAME:   Kimber Morris   : 1945   MRN: [id number]     Date/Time: 2024 8:46 AM  Patient PCP: Jennifer Horn FNP    Estimated discharge date:  Barriers:       Assessment / Plan:    Atrial fibrillation with RVR/PVCs  Continued on Cardizem gtt. although, cardiology has cleared patient.   - Will discontinue Cardizem gtt. for now.  Echocardiogram with preserved EF of 50% without any significant valvular pathology noted.  Appreciate cardiology consultation.  Cardiology only interpreted PACs and could not find any documentation of A-fib RVR from ED.  Monitor off of Cardizem gtt. for 24 hours prior to discharge home.  Continued on home metoprolol dosing.    Chest pain  Troponin x 2 negative.  Echocardiogram with preserved EF noted.    Acute renal failure -   Reported low urinary output by nursing on a.m. of .  Patient with bladder scan with minimal urine noted.  CrCl significantly reduced from  with creatinine up to 2.89 from 0.93.  Patient currently not on any active diuresis currently and no significant nausea/vomiting or diarrhea noted.   urinalysis negative.  Hold patient's Entresto, Aldactone, Jardiance.  Patient does seem to have some periodic low blood pressures however they seem to be controlled.  Start on gentle IV fluids and obtain nephrology consultation.    Back pain  Follow-up spine MRI, lumbar and thoracic.  MRI of cervical/thoracic/lumbar spine completed.  Lumbar spine showing L3-L4 canal stenosis without cord compression.     Left lower lobe partial collapse  Appreciate pulmonary consultation.  Patient does not have any cough, fevers/chills, hypoxia or shortness of breath.  Per pulmonology, will repeat chest x-ray likely on  to further evaluate atelectasis     Hypertension  Continue home antihypertensive medication     DM  Hold oral hypoglycemic medication  Start patient on sliding scale     Hyperlipidemia  Continue Lipitor   
      Hospitalist Progress Note    NAME:   Kimber Morris   : 1945   MRN: [id number]     Date/Time: 2024 8:59 AM  Patient PCP: Jennifer Horn FNP    Estimated discharge date:  Barriers:       Assessment / Plan:    Atrial fibrillation with RVR  Continue on Cardizem gtt. and likely can be weaned off after cardiology evaluation this a.m.  Echocardiogram with preserved EF of 50% without any significant valvular pathology noted.  Appreciate cardiology consultation.  Currently on anticoagulation started.    Chest pain  Troponin x 2 negative.  Appreciate cardiology consultation.    Back pain  Follow-up spine MRI, lumbar and thoracic.     Left lower lobe partial collapse  Appreciate pulmonary consultation.  Patient does not have any cough, fevers/chills, hypoxia or shortness of breath.     Hypertension  Continue home antihypertensive medication     DM  Hold oral hypoglycemic medication  Start patient on sliding scale     Hyperlipidemia  Continue Lipitor     Hypothyroidism  Check TSH level  Continue Synthyroid     History of asthma  Currently compensated.     DVT prophylaxis  Lovenox SC    Full CODE STATUS    Medical Decision Making:   I personally reviewed labs: CBC, CMP, BNP, troponins  I personally reviewed imaging:  I personally reviewed EKG:  Toxic drug monitoring:   Discussed case with: Patient, nursing      Subjective:     Chief Complaint / Reason for Physician Visit  \" Chest pain\".  Discussed with RN events overnight.     5/10 - Admission H&P   78 y.o.  female with PMHx significant for CHF, DM, GERD, hypertension, thyroid disease presented to the hospital for evaluation of chest pain radiated to the back associated with diaphoresis and nausea, patient denies any trauma denies any fever any chills, EKG showed A-fib with RVR, blood work was done showed no significant acute abnormality.       Patient is alert and oriented x 1 only.  Patient denies any chest pain, shortness of breath and chest pain 
    Hospitalist Progress Note               Daily Progress Note: 5/14/2024      Hospital Day: 5     Chief complaint:   Chief Complaint   Patient presents with    Back Pain    Atrial Fibrillation        Subjective:   Hospital course to date:  Kimber Morris is a 78 y.o.  female with PMHx significant for CHF, DM, GERD, hypertension, thyroid disease presented to the hospital for evaluation of chest pain radiated to the back associated with diaphoresis and nausea, patient denies any trauma denies any fever any chills, EKG showed A-fib with RVR, blood work was done showed no significant acute abnormality.  We were asked to admit for work up and evaluation of the above problems.  Patient was admitted and placed on Cardizem drip.  Patient was admitted.  She converted to RSR in diltiazem drip was stopped.  Cardiology consult was obtained.  Also during her stay there was a time when she had low urine output and there was episode of acute kidney injury.  Creatinine is continuing to trend downward    --------  5/14-Patient is seen today for follow-up.  There were no issues reported from overnight.  Currently she is feeling better.  She denies any chest pain currently.  No shortness of breath.  No lightheadedness.        Medications reviewed  Current Facility-Administered Medications   Medication Dose Route Frequency    heparin (porcine) injection 5,000 Units  5,000 Units SubCUTAneous 3 times per day    0.9 % sodium chloride infusion   IntraVENous Continuous    ipratropium 0.5 mg-albuterol 2.5 mg (DUONEB) nebulizer solution 1 Dose  1 Dose Inhalation TID RT    acetylcysteine (MUCOMYST) 20 % solution 1,000 mg  1,000 mg Inhalation TID RT    hydrALAZINE (APRESOLINE) tablet 25 mg  25 mg Oral TID    sodium chloride flush 0.9 % injection 5-40 mL  5-40 mL IntraVENous 2 times per day    sodium chloride flush 0.9 % injection 5-40 mL  5-40 mL IntraVENous PRN    0.9 % sodium chloride infusion   IntraVENous PRN    ondansetron (ZOFRAN-ODT) 
  Physician Progress Note      PATIENT:               ALAN MARTINEZ  CSN #:                  [id number]  :                       1945  ADMIT DATE:       5/10/2024 10:56 AM  DISCH DATE:        5/15/2024 1:53 PM  RESPONDING  PROVIDER #:        Levi Amador MD          QUERY TEXT:    Patient admitted with afib. Noted to have dietician assessment with   malnutrition diagnosis in  note. If possible, please document in progress   notes and discharge summary if you are evaluating and /or treating any of the   following:      The medical record reflects the following:  Risk Factors: 77 yo female with afib, DM, HTN  Clinical Indicators: Albumin 2.9;  Nutritional Consult: Mild malnutrition- 1%   to 2% weight loss over 1 week  Treatment: Advance diet to 3 CHO/    Thank you,  Sandi Brock RN, CCDS    ASPEN Criteria:    https://aspenjournals.onlinelibrary.zacarias.com/doi/full/10.1177/[email or web address]  5  Options provided:  -- Protein calorie malnutrition mild  -- Other - I will add my own diagnosis  -- Disagree - Not applicable / Not valid  -- Disagree - Clinically unable to determine / Unknown  -- Refer to Clinical Documentation Reviewer    PROVIDER RESPONSE TEXT:    This patient has mild protein calorie malnutrition.    Query created by: Sandi Brock on 5/15/2024 9:54 AM      Electronically signed by:  Levi Amador MD 5/15/2024 5:27 PM          
4 Eyes Skin Assessment     NAME:  Kimber Morris  YOB: 1945  MEDICAL RECORD NUMBER:  [id number]    The patient is being assessed for  Other weekly skin check    I agree that at least one RN has performed a thorough Head to Toe Skin Assessment on the patient. ALL assessment sites listed below have been assessed.      Areas assessed by both nurses:    Head, Face, Ears, Shoulders, Back, Chest, Arms, Elbows, Hands, Sacrum. Buttock, Coccyx, Ischium, Legs. Feet and Heels, and Under Medical Devices         Does the Patient have a Wound? No noted wound(s)       Nathaniel Prevention initiated by RN: Yes  Wound Care Orders initiated by RN: No    Pressure Injury (Stage 3,4, Unstageable, DTI, NWPT, and Complex wounds) if present, place Wound referral order by RN under : No    New Ostomies, if present place, Ostomy referral order under : No     Nurse 1 eSignature: Electronically signed by Maddi Sevilla RN on 5/15/24 at 5:19 AM EDT    **SHARE this note so that the co-signing nurse can place an eSignature**    Nurse 2 eSignature: Electronically signed by Naty Lopez RN on 5/15/24 at 5:21 AM EDT    
After Visit Summary:    For family history of melanoma (aunt)  - first degree relatives of affected (parents, siblings, adult children) should have a skin exam with a dermatologist due to increased risk    Wart    LIQUID NITROGEN INSTRUCTIONS    Freezing with liquid nitrogen is accompanied by a stinging, burning sensation which usually lasts from 15 minutes up to several hours.     It is normal for a blister to form at the treatment site.  Occasionally this will be a blood blister.  It is usually best to leave the blister unopened.      The blister may form into a dry crust.  The crust should peel off in 2-4 weeks.  There may be some minimal redness after the crust falls off, but this should fade with time.     It is okay to wash, shampoo, shower or apply cosmetics to the area, but the area should not be rubbed as this can irritate it.  Pat dry with a soft towel.    Apply Vaseline to the treated area while healing at least 2-3 times per day (goal is to keep it \"greasy\" while healing)       - recommend over the counter zicam gummies, take 1 daily, to help boost immune respone    - sending prescription --Wart peel, will send to [facility] compounding pharmacy, ~ 90 dollars out of pocket, instructions below--call to order    - can be treated w liquid nitrogen in office every 2-4 weeks    - can be very difficult to treat/refractory to treatment/require trials of multiple treatments before improvement is observed    (wart peel, instructions below)    We have prescribed compounded Wart Peel for you. Please call [phone] to arrange shipment of the medication to your home.     Scan Wart Peel QR code below for instructions, video, etc.        [facility]  [address] [place] [address]., [address]  [place], [place] [address]  Between 9:00 a.m. - 5:30 p.m. M-F  9:00 a.m. - 12:00 noon Sat.    Please watch our instructional video at [email or web address] (5fu, sal acid) in Remedium™ Delivery System    Medication Fact Sheet  Must be 
Comprehensive Nutrition Assessment    Type and Reason for Vsit:  Initial, Positive Nutrition Screen (MST 2)    Nutrition Recommendations/Plan:   Advance diet to 3 CHO/meal with ONS  Monitor po intake, weight and labs     Malnutrition Assessment:  Malnutrition Status:  Mild malnutrition (05/13/24 1516)    Context:  Acute Illness     Findings of the 6 clinical characteristics of malnutrition:  Energy Intake:  No significant decrease in energy intake  Weight Loss:  1% to 2% over 1 week     Body Fat Loss:  Unable to assess     Muscle Mass Loss:  Unable to assess    Fluid Accumulation:  Unable to assess     Strength:  Not Performed    Nutrition Assessment:    Patient admitted with CP, continues on a clear liquid diet with ONS, intake 25-50%, current weight below acceptable BMI for age. Weight down since admission, no edema noted, however weight change appears to be related to fluid status. Meds and Labs reviewed    Nutrition Related Findings:    NFPE deferred. No chewing or swallowing difficulties noted, no N/V/D Wound Type: None       Current Nutrition Intake & Therapies:    Average Meal Intake: 26-50%  Average Supplements Intake: 26-50%  ADULT DIET; Clear Liquid; No Caffeine  ADULT ORAL NUTRITION SUPPLEMENT; Breakfast, Lunch, Dinner; Standard 4 oz Oral Supplement    Anthropometric Measures:  Height: 157.5 cm (5' 2.01\")  Ideal Body Weight (IBW): 110 lbs (50 kg)    Admission Body Weight: 52.9 kg (116 lb 10 oz)  Current Body Weight: 51.7 kg (113 lb 15.7 oz), 103.6 % IBW. Weight Source: Bed Scale  Current BMI (kg/m2): 20.8                          BMI Categories: Underweight (BMI less than 22) age over 65    Estimated Daily Nutrient Needs:  Energy Requirements Based On: Kcal/kg  Weight Used for Energy Requirements: Current  Energy (kcal/day): 1800 kcals (35kcals/kg)  Weight Used for Protein Requirements: Current  Protein (g/day): 62 gr (1.2gr/kg)  Method Used for Fluid Requirements: 1 ml/kcal  Fluid (ml/day): 1800 ml  
Patient has not voided on her own in over 24 hours. Bladder scanner used 5/12 around 1230 reflecting 441mL of retained urine, 300mL removed via straight catheter.     Patient has not voided since 7am. Bladder scanner performed around 1300 and reflected 330mL. Patient does not have enough output to perform straight cath. Will continue to monitor.    Provider notified, consult to nephrology ordered, BMP ordered, will follow up accordingly.    
Patient noted to be incontinent of urine and had removed peripheral IV.  Patient bathed, linen's changed and 2 attempts made to start PIV -unsuccessful.  
Patient sill not able to produce urine output on her own, bladder scanner reflects 349, still not enough to straight cath. Will report to night shift nurse to monitor output overnight and follow up accordingly.   
Pulmonary and Critical Care  progress note    Subjective:   Consult Note: 5/14/2024 @no control      Chief Complaint:   Chief Complaint   Patient presents with    Back Pain    Atrial Fibrillation      This patient has been seen and evaluated at the request of Dr. Joseph    Patient seen and examined.  Discussed with nursing staff.  She is in bed and remains on room air.  She voices no respiratory complaints.  Appears depressed.  Nephrology is consulted.  She has coughed up small amount of mostly clear sputum.  She had a chest x-ray done which basically shows no acute process.  Resolved left basilar atelectasis.    On 5/13/2024: Patient seen and examined.  Discussed with the nursing staff.  She is laying comfortably in bed.  On room air.  Voices no respiratory complaints.  She does appear to have somewhat of a productive cough.    On 5/12/2024: Patient seen examined   Overnight events noted    Lying in bed comfortably   Awake and alert  On room air   No acute distress   Remains on IV diltiazem drip       Review of Systems:  Pertinent items are noted in HPI.    Past Medical History:   Diagnosis Date    Asthma     Chest discomfort     pt states off & on for few months with or without activity    CHF (congestive heart failure) (HCC)     DM (diabetes mellitus) (HCC)     GERD (gastroesophageal reflux disease)     HTN (hypertension)     Thyroid disease      Past Surgical History:   Procedure Laterality Date    HYSTERECTOMY (CERVIX STATUS UNKNOWN)      [facility]        Family History   Problem Relation Age of Onset    Hypertension Mother     Diabetes Mother     Heart Disease Mother      Social History     Tobacco Use    Smoking status: Never    Smokeless tobacco: Never   Substance Use Topics    Alcohol use: No      Current Facility-Administered Medications   Medication Dose Route Frequency Provider Last Rate Last Admin    heparin (porcine) injection 5,000 Units  5,000 Units SubCUTAneous 3 times per day Thaddeus Jones MD   
RRT Clinical Rounding Nurse Progress Report      SUBJECTIVE: Patient assessed secondary to       Vitals:    05/12/24 0600 05/12/24 0650 05/12/24 0811 05/12/24 0816   BP:    90/60   Pulse:    89   Resp:    22   Temp:    98.1 °F (36.7 °C)   TempSrc:    Oral   SpO2:  95% 96% 95%   Weight: 52.9 kg (116 lb 11.2 oz)      Height:            DETERIORATION INDEX SCORE: 53    ASSESSMENT:  53    PLAN:  Notifed Torrie Atkins Rn of elevated DI score.  States she will notify the nurse taking care of patient.    Milady Swanson RN       
VSS. Patient discharged home with home health. Discharge instruction reviewed with patient, daughter, and  at bedside.   
  Recent Labs     05/10/24  1106 05/10/24  1342 05/10/24  1651 05/11/24  0220     --   --  135*   K Hemolyzed, Recollection Recommended Hemolyzed, Recollection Recommended 3.6 3.6     --   --  104   CO2 27  --   --  21   BUN 18  --   --  14   CREATININE 1.20*  --   --  0.93   GLUCOSE 138*  --   --  185*   CALCIUM 10.1  --   --  8.9   MG Hemolyzed, Recollection Recommended Hemolyzed, Recollection Recommended  --   --      Recent Labs     05/10/24  1907   NTPROBNP 999*     No results found for: \"CHOL\", \"TRIG\", \"HDL\", \"VLDL\", \"CHOLHDLRATIO\"    Recent Labs     05/11/24  0220   AST 22   ALKPHOS 64   GLOB 4.0     No results for input(s): \"PH\", \"PCO2\", \"PO2\" in the last 72 hours.    Medications Personally Reviewed:    Current Facility-Administered Medications   Medication Dose Route Frequency    hydrALAZINE (APRESOLINE) tablet 25 mg  25 mg Oral TID    dilTIAZem 125 mg in sodium chloride 0.9 % 125 mL infusion ([id number])  2.5-15 mg/hr IntraVENous Continuous    sodium chloride flush 0.9 % injection 5-40 mL  5-40 mL IntraVENous 2 times per day    sodium chloride flush 0.9 % injection 5-40 mL  5-40 mL IntraVENous PRN    0.9 % sodium chloride infusion   IntraVENous PRN    ondansetron (ZOFRAN-ODT) disintegrating tablet 4 mg  4 mg Oral Q8H PRN    Or    ondansetron (ZOFRAN) injection 4 mg  4 mg IntraVENous Q6H PRN    acetaminophen (TYLENOL) tablet 650 mg  650 mg Oral Q6H PRN    Or    acetaminophen (TYLENOL) suppository 650 mg  650 mg Rectal Q6H PRN    polyethylene glycol (GLYCOLAX) packet 17 g  17 g Oral Daily PRN    aspirin chewable tablet 81 mg  81 mg Oral Daily    rosuvastatin (CRESTOR) tablet 20 mg  20 mg Oral Nightly    sacubitril-valsartan (ENTRESTO) 24-26 MG per tablet 1 tablet  1 tablet Oral BID    metoprolol succinate (TOPROL XL) extended release tablet 25 mg  25 mg Oral Daily    spironolactone (ALDACTONE) tablet 25 mg  25 mg Oral Daily    empagliflozin (JARDIANCE) tablet 10 mg  10 mg Oral Daily    enoxaparin 
0.9   PROTIME 12.8   APTT 32.4        Recent Labs     05/10/24  1342 05/10/24  1651 05/11/24  0220   NA  --   --  135*   K Hemolyzed, Recollection Recommended 3.6 3.6   CL  --   --  104   CO2  --   --  21   BUN  --   --  14   CREATININE  --   --  0.93   GLUCOSE  --   --  185*   CALCIUM  --   --  8.9   MG Hemolyzed, Recollection Recommended  --   --        Recent Labs     05/10/24  1907   NTPROBNP 999*       No results found for: \"CHOL\", \"TRIG\", \"HDL\", \"VLDL\", \"CHOLHDLRATIO\"    Recent Labs     05/11/24  0220   AST 22   ALKPHOS 64   GLOB 4.0       No results for input(s): \"PH\", \"PCO2\", \"PO2\" in the last 72 hours.    Medications Personally Reviewed:    Current Facility-Administered Medications   Medication Dose Route Frequency    ipratropium 0.5 mg-albuterol 2.5 mg (DUONEB) nebulizer solution 1 Dose  1 Dose Inhalation TID RT    acetylcysteine (MUCOMYST) 20 % solution 1,000 mg  1,000 mg Inhalation TID RT    hydrALAZINE (APRESOLINE) tablet 25 mg  25 mg Oral TID    sodium chloride flush 0.9 % injection 5-40 mL  5-40 mL IntraVENous 2 times per day    sodium chloride flush 0.9 % injection 5-40 mL  5-40 mL IntraVENous PRN    0.9 % sodium chloride infusion   IntraVENous PRN    ondansetron (ZOFRAN-ODT) disintegrating tablet 4 mg  4 mg Oral Q8H PRN    Or    ondansetron (ZOFRAN) injection 4 mg  4 mg IntraVENous Q6H PRN    acetaminophen (TYLENOL) tablet 650 mg  650 mg Oral Q6H PRN    Or    acetaminophen (TYLENOL) suppository 650 mg  650 mg Rectal Q6H PRN    polyethylene glycol (GLYCOLAX) packet 17 g  17 g Oral Daily PRN    aspirin chewable tablet 81 mg  81 mg Oral Daily    rosuvastatin (CRESTOR) tablet 20 mg  20 mg Oral Nightly    sacubitril-valsartan (ENTRESTO) 24-26 MG per tablet 1 tablet  1 tablet Oral BID    metoprolol succinate (TOPROL XL) extended release tablet 25 mg  25 mg Oral Daily    spironolactone (ALDACTONE) tablet 25 mg  25 mg Oral Daily    empagliflozin (JARDIANCE) tablet 10 mg  10 mg Oral Daily    enoxaparin 
Randy GUAJARDO MD   10 mL at 05/13/24 0916    sodium chloride flush 0.9 % injection 5-40 mL  5-40 mL IntraVENous PRN Randy Fuchs MD        0.9 % sodium chloride infusion   IntraVENous PRN Randy Fuchs MD        ondansetron (ZOFRAN-ODT) disintegrating tablet 4 mg  4 mg Oral Q8H PRN Randy Fuchs MD        Or    ondansetron (ZOFRAN) injection 4 mg  4 mg IntraVENous Q6H PRN Randy Fuchs MD        acetaminophen (TYLENOL) tablet 650 mg  650 mg Oral Q6H PRN Randy Fuchs MD        Or    acetaminophen (TYLENOL) suppository 650 mg  650 mg Rectal Q6H PRN Randy Fuchs MD        polyethylene glycol (GLYCOLAX) packet 17 g  17 g Oral Daily PRN Randy Fuchs MD        aspirin chewable tablet 81 mg  81 mg Oral Daily Randy Fuchs MD   81 mg at 05/13/24 0916    rosuvastatin (CRESTOR) tablet 20 mg  20 mg Oral Nightly Randy Fuchs MD   20 mg at 05/12/24 2131    sacubitril-valsartan (ENTRESTO) 24-26 MG per tablet 1 tablet  1 tablet Oral BID Shane Hunter MD   1 tablet at 05/13/24 0916    metoprolol succinate (TOPROL XL) extended release tablet 25 mg  25 mg Oral Daily Shane Hunter MD   25 mg at 05/13/24 0916    spironolactone (ALDACTONE) tablet 25 mg  25 mg Oral Daily Shane Hunter MD   25 mg at 05/13/24 0916    empagliflozin (JARDIANCE) tablet 10 mg  10 mg Oral Daily Shane Hunter MD   10 mg at 05/13/24 0916    enoxaparin (LOVENOX) injection 40 mg  40 mg SubCUTAneous Daily Randy Fuchs MD   40 mg at 05/13/24 0916    insulin lispro (HUMALOG,ADMELOG) injection vial 0-4 Units  0-4 Units SubCUTAneous TID WC Randy Fuchs MD   1 Units at 05/13/24 0924    insulin lispro (HUMALOG,ADMELOG) injection vial 0-4 Units  0-4 Units SubCUTAneous Nightly Randy Fuchs MD        albuterol sulfate HFA (PROVENTIL;VENTOLIN;PROAIR) 108 (90 Base) MCG/ACT inhaler 2 puff  2 puff Inhalation PRN Randy Fuchs MD        clopidogrel (PLAVIX) tablet 75 mg  75 mg Oral Daily Randy Fuchs MD   75 mg at 05/13/24 
dispensed in hardeep syringe to ensure quality of medication.  PRIOR TO USING THIS MEDICATION, IT IS IMPORTANT TO INFORM YOUR PHYSICIAN  IF YOU ARE PREGNANT OR THINKING OF BECOMING PREGNANT.  **This medication was custom compounded for you based on the prescription orders of  your physician.  Description of this Medication:  This medication contains Salicylic Acid and 5-fluorouracil in a proprietary base that is  used to treat warts.  How to use this medication:  1. Apply medication at bedtime.  2. Apply very small amount of medication to a flat plastic applicator. Use the applicator to  apply a thin layer directly onto the wart. Use care to avoid applying the medicine to healthy  skin.The medicine will break down healthy skin as well as the warts. Throw the pik away.  3. Occlude the wart with the tape provided.  4. Put the cap back tightly on the syringe.  5. Wash hands after applying the medicine. NEVER put the WARTpeel® in the  mouth, nose, or eyes.  6. Remove the occlusion in the morning and wash the area thoroughly.  7. In case of accidental ingestion or contact with eye, nose, or mouth,  contact [facility] or the local poison control center.  What to expect: During the first few days of application the skin around the wart may swell and become white.  This will slough off with continued applications.  Normal Dosage:The medication is applied once daily for a time determined by your physician.  Storage Requirements:Store this medication at room temperature. Keep out of reach of children. PROTECT FROM LIGHT.  Expiration Date:The medication is good for four months from the date made. Do not keep outdated medication.  Side effects:Rash and irritation, if medication is applied to good skin.  Cautions and Warnings:Only apply the medication to the warts. Do not apply to good skin. Keep away from children.  Do not use on nose, eyes, or mouth. W Peel 09/08       Sunscreen: potential FDA recall of certain sunscreens 
--   05/10/24 2204 111/81 -- -- (!) 104 21 94 % -- --   05/10/24 2149 (!) 161/115 -- -- (!) 113 22 94 % -- --   05/10/24 2134 (!) 186/109 -- -- (!) 119 19 96 % -- --   05/10/24 2119 (!) 154/96 -- -- (!) 137 25 94 % -- --   05/10/24 2105 (!) 161/106 -- -- (!) 106 -- -- -- --   05/10/24 2104 (!) 161/106 -- -- (!) 120 (!) 33 95 % -- --   05/10/24 2049 (!) 138/127 -- -- (!) 118 20 96 % -- --   05/10/24 2034 -- -- -- -- -- -- -- 54.4 kg (119 lb 14.9 oz)   05/10/24 2025 (!) 171/86 97.8 °F (36.6 °C) Oral (!) 120 26 95 % -- --   05/10/24 2010 (!) 181/110 -- -- (!) 104 22 97 % -- --   05/10/24 1910 (!) 162/92 98.3 °F (36.8 °C) Oral (!) 116 16 97 % -- --   05/10/24 1826 -- -- -- 97 25 -- -- --   05/10/24 1800 (!) 168/93 -- -- (!) 118 22 -- -- --   05/10/24 1756 -- -- -- (!) 109 (!) 33 -- -- --   05/10/24 1753 (!) 164/111 -- -- -- -- -- -- --   05/10/24 1730 -- -- -- 99 30 97 % -- --   05/10/24 1700 (!) 147/89 -- -- 85 24 97 % -- --   05/10/24 1615 -- -- -- (!) 102 21 97 % -- --   05/10/24 1608 -- -- -- 89 27 97 % -- --   05/10/24 1445 (!) 140/87 -- -- 94 24 95 % -- --   05/10/24 1415 (!) 167/95 -- -- (!) 106 25 95 % -- --   05/10/24 1345 (!) 168/94 -- -- (!) 125 25 92 % -- --   05/10/24 1300 (!) 177/89 -- -- (!) 107 28 97 % -- --   05/10/24 1230 (!) 178/90 -- -- 90 25 95 % -- --   05/10/24 1100 (!) 190/70 98.5 °F (36.9 °C) Oral 86 23 93 % 1.575 m (5' 2\") 59 kg (130 lb)         Intake/Output Summary (Last 24 hours) at 5/11/2024 0809  Last data filed at 5/11/2024 0604  Gross per 24 hour   Intake 82.34 ml   Output --   Net 82.34 ml        I had a face to face encounter and independently examined this patient on 5/11/2024, as outlined below:  PHYSICAL EXAM:  General: Alert, cooperative  EENT:  EOMI. Anicteric sclerae.  Resp:  CTA bilaterally, no wheezing or rales.  No accessory muscle use  CV:  Irregularly irregular rhythm,  No edema  GI:  Soft, Non distended, Non tender.  +Bowel sounds  Neurologic:  Alert and oriented X 3, 
coming up, recommend sticking to safe list below, recommend SPF , reapply every 2 hours when out     Sun Screen Safe List:  https://[email or web address]/wp-content/uploads/Aisppminta-C-Cioup-0-gj-Pvjnxtty-FDA-Citizen-Petition-on-Sunscreen-v2.pdf    Return for follow up in:  Pending response to above treatment plan, sooner as needed    Please call with any questions or concerns     Johanne Alfaro MD  [facility]  [phone]    In the next few weeks you may receive a link to an online survey, or a [employer] survey by mail, regarding your most recent clinic visit with us.  Please take a few moments to accurately evaluate your visit. We strive to provide you with the best medical care. Your feedback will assist us in achieving this. Again, thank you for your time and we look forward to your next visit.        Caring for Dry Skin    Bathing Instructions:  * Use lukewarm water--avoid hot or cold water    * Do not use soap, or if you must use soap, limit use to underwear area/armpits/feet (shampoo/conditioner also okay)    * Mild soaps (recommended): Dove sensitive skin, Cetaphil gentle cleansing bar, Vanicream cleansing chemical-free bar soap, Aveeno fragrance free bar soap    * Avoid harsh, drying soaps such as Ivory, Dial, Radha Spring, Zest    * Be gentle: Do not scrub with a washcloth, sponge, or brush    * Reduce bathing time to less than 15 minutes. Showers are better than baths.    * After bathing, pat skin dry with a towel and within 5 minutes apply a moisturizer    * Recommended moisturizer: Lac Hydrin (prescription) or if not covered by insurance/expensive after insurance coverage, please substitute amlactin 12% cream ([employer], amazon.com, etc)    * Avoid colognes, perfumes, sprays, and powders on your skin    * Do not wear tight or rough clothing. Wool clothes and new clothes may be irritating    Laundry Care:    * Use only a small amount of fragrance-free laundry products such as Cheer Free and 
Gentle, All Free and Clear, or Purex Free and Clear    * For persistent dry skin or inflammation, double rinse clothes after washing    * Do not use fabric softeners or if you must use them, choose a fragrance-free product     Prescription Medicines:    * Apply prescription creams and ointments to the affected areas as directed    * In general if using a prescription cream/ointment and a moisturizer, apply the prescription product first     
care team  Thank you for involving me in the care of this patient  I will follow with you closely during hospitalization    Time spent more than 30 minutes in direct patient care with no overlap reviewing results and records, decision making, and answering questions.      IMTIAZ JONES MD  [facility] (PAT)  5/12/2024  2:28 PM

## 2024-05-15 NOTE — CARE COORDINATION
CM met with patient and daughter in room to discuss PT recc for HH, they are agreeable, no preference sharlene.  Patient accepted with [facility].    Transition of Care Plan:    RUR: 13%  Prior Level of Functioning: indep  Disposition: HH  If SNF or IPR: Date FOC offered:   Date FOC received:   Accepting facility: [facility]  Date authorization started with reference number:   Date authorization received and expires:   Follow up appointments: yes  DME needed: n/a  Transportation at discharge: family  IM/IMM Medicare/ letter given: yes  Is patient a  and connected with [facility]?    If yes, was [facility] transfer form completed and [facility] notified?   Caregiver Contact: daughter  Discharge Caregiver contacted prior to discharge? yes  Care Conference needed?   Barriers to discharge: n/a

## 2024-05-15 NOTE — DISCHARGE SUMMARY
Physician Discharge Summary     Patient ID:    Kimber Morris  [id number]  78 y.o.  1945    Admit date: 5/10/2024    Discharge date : 5/15/2024      Final Diagnoses:   Chest pain [R07.9]  Atrial fibrillation, unspecified type (HCC) [I48.91]  Acute kidney injury  Paroxysmal atrial fibrillation with rapid ventricular response  Left lower lobe atelectasis  Type 2 diabetes  Nonischemic cardiomyopathy    Reason for Hospitalization:  Kimber Morris is a 78 y.o.  female with PMHx significant for CHF, DM, GERD, hypertension, thyroid disease presented to the hospital for evaluation of chest pain radiated to the back associated with diaphoresis and nausea, patient denies any trauma denies any fever any chills, EKG showed A-fib with RVR, blood work was done showed no significant acute abnormality.  We were asked to admit for work up and evaluation of the above problems.  Patient was admitted and placed on Cardizem drip.  Patient was admitted.  She converted to RSR in diltiazem drip was stopped.  Cardiology consult was obtained.  Also during her stay there was a time when she had low urine output and there was episode of acute kidney injury.  Creatinine is continuing to trend downward      Hospital Course:   Patient was admitted to telemetry and monitored.  Medications were given as ordered.  Cardiology consult was obtained, she was evaluated and medications were adjusted.  Patient noted to revert back to RSR and there were no issues and she remained in stable condition.  Chest pain resolved and there was no concern for an acute cardiac event.  During her stay as well she had an abrupt increase in her creatinine and nephrology was consulted.  Certain medications were held, she was evaluated by nephrology and her creatinine was monitored.  Did not have any recurrence of her chest pain, daily creatinine was monitored and was noted to be downtrending.  Her blood pressure remained in stable condition and she was

## 2024-07-09 ENCOUNTER — OFFICE VISIT (OUTPATIENT)
Age: 79
End: 2024-07-09
Payer: MEDICARE

## 2024-07-09 VITALS
DIASTOLIC BLOOD PRESSURE: 72 MMHG | TEMPERATURE: 98.5 F | HEIGHT: 62 IN | OXYGEN SATURATION: 99 % | WEIGHT: 127.2 LBS | BODY MASS INDEX: 23.41 KG/M2 | SYSTOLIC BLOOD PRESSURE: 156 MMHG | HEART RATE: 71 BPM

## 2024-07-09 DIAGNOSIS — L60.2 HYPERTROPHY OF NAIL: Primary | ICD-10-CM

## 2024-07-09 DIAGNOSIS — M79.675 PAIN IN TOES OF BOTH FEET: ICD-10-CM

## 2024-07-09 DIAGNOSIS — M79.674 PAIN IN TOES OF BOTH FEET: ICD-10-CM

## 2024-07-09 PROCEDURE — 1123F ACP DISCUSS/DSCN MKR DOCD: CPT | Performed by: PODIATRIST

## 2024-07-09 PROCEDURE — G8420 CALC BMI NORM PARAMETERS: HCPCS | Performed by: PODIATRIST

## 2024-07-09 PROCEDURE — 1090F PRES/ABSN URINE INCON ASSESS: CPT | Performed by: PODIATRIST

## 2024-07-09 PROCEDURE — G8427 DOCREV CUR MEDS BY ELIG CLIN: HCPCS | Performed by: PODIATRIST

## 2024-07-09 PROCEDURE — G8400 PT W/DXA NO RESULTS DOC: HCPCS | Performed by: PODIATRIST

## 2024-07-09 PROCEDURE — 99213 OFFICE O/P EST LOW 20 MIN: CPT | Performed by: PODIATRIST

## 2024-07-09 PROCEDURE — 1036F TOBACCO NON-USER: CPT | Performed by: PODIATRIST

## 2024-07-09 ASSESSMENT — ENCOUNTER SYMPTOMS
VOMITING: 0
DIARRHEA: 0
SHORTNESS OF BREATH: 0
ABDOMINAL PAIN: 0

## 2024-07-09 NOTE — PROGRESS NOTES
Chief Complaint   Patient presents with    Follow-up     Nail trim      BP (!) 156/72 (Site: Left Upper Arm, Position: Sitting, Cuff Size: Medium Adult)   Pulse 71   Temp 98.5 °F (36.9 °C) (Temporal)   Ht 1.575 m (5' 2.01\")   Wt 57.7 kg (127 lb 3.2 oz)   SpO2 99%   BMI 23.26 kg/m²     1. Have you been to the ER, urgent care clinic since your last visit?  Hospitalized since your last visit?No    2. Have you seen or consulted any other health care providers outside of the [facility] since your last visit?  Include any pap smears or colon screening. No    
(VENTOLIN HFA) 108 (90 Base) MCG/ACT inhaler Inhale 2 puffs into the lungs as needed  Patient not taking: Reported on 7/9/2024 2/24/16   Automatic Reconciliation, Ar   aspirin 81 MG EC tablet Take 1 tablet by mouth daily  Patient not taking: Reported on 7/9/2024    Automatic Reconciliation, Ar   clopidogrel (PLAVIX) 75 MG tablet Take 1 tablet by mouth daily    Automatic Reconciliation, Ar   spironolactone (ALDACTONE) 25 MG tablet Take 0.5 tablets by mouth daily  Patient not taking: Reported on 7/9/2024 8/17/22   Automatic Reconciliation, Ar       Review of Systems   Constitutional:  Negative for activity change, appetite change, chills, fatigue and fever.   HENT:  Negative for ear pain.    Respiratory:  Negative for shortness of breath.    Cardiovascular:  Negative for leg swelling.   Gastrointestinal:  Negative for abdominal pain, diarrhea and vomiting.   Neurological:  Negative for weakness.   Psychiatric/Behavioral:  Negative for behavioral problems. The patient is not nervous/anxious.           Objective:     Vitals:    07/09/24 0936   BP: (!) 156/72   Pulse: 71   Temp: 98.5 °F (36.9 °C)   SpO2: 99%       GEN: Pt is AAOx4 and in NAD. No dressing noted to B/L LE. No family noted at BS  DERM: Nails of bilateral hallux noted to be thickened again dystrophic with subungual debris no wounds noted to B/L LE. No dry skin noted to B/L LE. No proximal lymphatic streaking noted to B/L LE. NCSOI noted to B/L LE  VASC: Pedal pulses (DP/PT) palpable to B/L LE. CFT<3sec to all digits of B/L LE. Pedal hair growth noted to the level of the digits for B/L LE. Skin temp is warm to warm from proximal to distal for B/L LE. Neg homans/melinda signs to B/L LE. No varicosities or telangectasias noted to B/L LE.  NEURO: Protective and epicritic sensations grossly intact to B/L LE  MSK: (-) POP, No gross deformities. Good muscle tone and bulk noted to B/L LE.  PSYCH: Cooperative with normal mood and affect     Assessment:      Diagnosis

## 2024-11-16 ENCOUNTER — APPOINTMENT (OUTPATIENT)
Facility: HOSPITAL | Age: 79
End: 2024-11-16
Payer: MEDICARE

## 2024-11-16 ENCOUNTER — HOSPITAL ENCOUNTER (INPATIENT)
Facility: HOSPITAL | Age: 79
LOS: 6 days | Discharge: HOME HEALTH | End: 2024-11-23
Attending: STUDENT IN AN ORGANIZED HEALTH CARE EDUCATION/TRAINING PROGRAM | Admitting: STUDENT IN AN ORGANIZED HEALTH CARE EDUCATION/TRAINING PROGRAM
Payer: MEDICARE

## 2024-11-16 DIAGNOSIS — R06.03 RESPIRATORY DISTRESS: ICD-10-CM

## 2024-11-16 DIAGNOSIS — R09.02 HYPOXIA: ICD-10-CM

## 2024-11-16 DIAGNOSIS — I48.91 ATRIAL FIBRILLATION WITH RAPID VENTRICULAR RESPONSE (HCC): ICD-10-CM

## 2024-11-16 DIAGNOSIS — I50.9 ACUTE ON CHRONIC CONGESTIVE HEART FAILURE, UNSPECIFIED HEART FAILURE TYPE (HCC): Primary | ICD-10-CM

## 2024-11-16 LAB
ALBUMIN SERPL-MCNC: 2.8 G/DL (ref 3.5–5)
ALBUMIN/GLOB SERPL: 0.7 (ref 1.1–2.2)
ALP SERPL-CCNC: 81 U/L (ref 45–117)
ALT SERPL-CCNC: 61 U/L (ref 12–78)
ANION GAP SERPL CALC-SCNC: 12 MMOL/L (ref 2–12)
AST SERPL W P-5'-P-CCNC: 54 U/L (ref 15–37)
BASE DEFICIT BLD-SCNC: 12.4 MMOL/L
BASOPHILS # BLD: 0 K/UL (ref 0–0.1)
BASOPHILS NFR BLD: 0 % (ref 0–1)
BILIRUB SERPL-MCNC: 0.6 MG/DL (ref 0.2–1)
BNP SERPL-MCNC: 9527 PG/ML
BUN SERPL-MCNC: 20 MG/DL (ref 6–20)
BUN/CREAT SERPL: 12 (ref 12–20)
CA-I BLD-MCNC: 8.3 MG/DL (ref 8.5–10.1)
CHLORIDE SERPL-SCNC: 110 MMOL/L (ref 97–108)
CO2 SERPL-SCNC: 21 MMOL/L (ref 21–32)
CREAT SERPL-MCNC: 1.67 MG/DL (ref 0.55–1.02)
D DIMER PPP FEU-MCNC: 3.41 UG/ML(FEU)
DIFFERENTIAL METHOD BLD: ABNORMAL
EOSINOPHIL # BLD: 0.1 K/UL (ref 0–0.4)
EOSINOPHIL NFR BLD: 1 % (ref 0–7)
ERYTHROCYTE [DISTWIDTH] IN BLOOD BY AUTOMATED COUNT: 14.2 % (ref 11.5–14.5)
GLOBULIN SER CALC-MCNC: 4 G/DL (ref 2–4)
GLUCOSE BLD STRIP.AUTO-MCNC: 211 MG/DL (ref 65–100)
GLUCOSE SERPL-MCNC: 301 MG/DL (ref 65–100)
HCO3 BLD-SCNC: 15.5 MMOL/L (ref 19–28)
HCT VFR BLD AUTO: 39.9 % (ref 35–47)
HGB BLD-MCNC: 12.1 G/DL (ref 11.5–16)
IMM GRANULOCYTES # BLD AUTO: 0.1 K/UL (ref 0–0.04)
IMM GRANULOCYTES NFR BLD AUTO: 1 % (ref 0–0.5)
LACTATE BLD-SCNC: 5 MMOL/L (ref 0.4–2)
LACTATE BLD-SCNC: 7.57 MMOL/L (ref 0.4–2)
LYMPHOCYTES # BLD: 2.7 K/UL (ref 0.8–3.5)
LYMPHOCYTES NFR BLD: 21 % (ref 12–49)
MAGNESIUM SERPL-MCNC: 2 MG/DL (ref 1.6–2.4)
MCH RBC QN AUTO: 31 PG (ref 26–34)
MCHC RBC AUTO-ENTMCNC: 30.3 G/DL (ref 30–36.5)
MCV RBC AUTO: 102.3 FL (ref 80–99)
MONOCYTES # BLD: 0.4 K/UL (ref 0–1)
MONOCYTES NFR BLD: 3 % (ref 5–13)
NEUTS SEG # BLD: 9.8 K/UL (ref 1.8–8)
NEUTS SEG NFR BLD: 74 % (ref 32–75)
NRBC # BLD: 0 K/UL (ref 0–0.01)
NRBC BLD-RTO: 0 PER 100 WBC
PCO2 BLD: 41.6 MMHG (ref 35–45)
PERFORMED BY:: ABNORMAL
PERFORMED BY:: ABNORMAL
PH BLD: 7.18 (ref 7.35–7.45)
PLATELET # BLD AUTO: 226 K/UL (ref 150–400)
PMV BLD AUTO: 10.3 FL (ref 8.9–12.9)
PO2 BLD: 61 MMHG (ref 75–100)
POTASSIUM SERPL-SCNC: 3.3 MMOL/L (ref 3.5–5.1)
PROCALCITONIN SERPL-MCNC: <0.05 NG/ML
PROT SERPL-MCNC: 6.8 G/DL (ref 6.4–8.2)
RBC # BLD AUTO: 3.9 M/UL (ref 3.8–5.2)
SAO2 % BLD: 84.4 %
SERVICE CMNT-IMP: ABNORMAL
SERVICE CMNT-IMP: ABNORMAL
SODIUM SERPL-SCNC: 143 MMOL/L (ref 136–145)
SPECIMEN SITE: ABNORMAL
SPECIMEN TYPE: ABNORMAL
TROPONIN I SERPL HS-MCNC: 89 NG/L (ref 0–51)
WBC # BLD AUTO: 13.2 K/UL (ref 3.6–11)

## 2024-11-16 PROCEDURE — 94761 N-INVAS EAR/PLS OXIMETRY MLT: CPT

## 2024-11-16 PROCEDURE — 82803 BLOOD GASES ANY COMBINATION: CPT

## 2024-11-16 PROCEDURE — 71275 CT ANGIOGRAPHY CHEST: CPT

## 2024-11-16 PROCEDURE — 36415 COLL VENOUS BLD VENIPUNCTURE: CPT

## 2024-11-16 PROCEDURE — 83880 ASSAY OF NATRIURETIC PEPTIDE: CPT

## 2024-11-16 PROCEDURE — 2700000000 HC OXYGEN THERAPY PER DAY

## 2024-11-16 PROCEDURE — 94762 N-INVAS EAR/PLS OXIMTRY CONT: CPT

## 2024-11-16 PROCEDURE — 85379 FIBRIN DEGRADATION QUANT: CPT

## 2024-11-16 PROCEDURE — 71045 X-RAY EXAM CHEST 1 VIEW: CPT

## 2024-11-16 PROCEDURE — 96374 THER/PROPH/DIAG INJ IV PUSH: CPT

## 2024-11-16 PROCEDURE — 6360000004 HC RX CONTRAST MEDICATION: Performed by: STUDENT IN AN ORGANIZED HEALTH CARE EDUCATION/TRAINING PROGRAM

## 2024-11-16 PROCEDURE — 83735 ASSAY OF MAGNESIUM: CPT

## 2024-11-16 PROCEDURE — 83605 ASSAY OF LACTIC ACID: CPT

## 2024-11-16 PROCEDURE — 84484 ASSAY OF TROPONIN QUANT: CPT

## 2024-11-16 PROCEDURE — 93005 ELECTROCARDIOGRAM TRACING: CPT | Performed by: STUDENT IN AN ORGANIZED HEALTH CARE EDUCATION/TRAINING PROGRAM

## 2024-11-16 PROCEDURE — 80047 BASIC METABLC PNL IONIZED CA: CPT

## 2024-11-16 PROCEDURE — 94660 CPAP INITIATION&MGMT: CPT

## 2024-11-16 PROCEDURE — 2580000003 HC RX 258: Performed by: STUDENT IN AN ORGANIZED HEALTH CARE EDUCATION/TRAINING PROGRAM

## 2024-11-16 PROCEDURE — 6360000002 HC RX W HCPCS: Performed by: STUDENT IN AN ORGANIZED HEALTH CARE EDUCATION/TRAINING PROGRAM

## 2024-11-16 PROCEDURE — 84145 PROCALCITONIN (PCT): CPT

## 2024-11-16 PROCEDURE — 96365 THER/PROPH/DIAG IV INF INIT: CPT

## 2024-11-16 PROCEDURE — 85025 COMPLETE CBC W/AUTO DIFF WBC: CPT

## 2024-11-16 PROCEDURE — 80053 COMPREHEN METABOLIC PANEL: CPT

## 2024-11-16 PROCEDURE — 87040 BLOOD CULTURE FOR BACTERIA: CPT

## 2024-11-16 PROCEDURE — 2500000003 HC RX 250 WO HCPCS: Performed by: STUDENT IN AN ORGANIZED HEALTH CARE EDUCATION/TRAINING PROGRAM

## 2024-11-16 PROCEDURE — 99291 CRITICAL CARE FIRST HOUR: CPT

## 2024-11-16 RX ORDER — METOPROLOL TARTRATE 1 MG/ML
5 INJECTION, SOLUTION INTRAVENOUS EVERY 6 HOURS
Status: DISCONTINUED | OUTPATIENT
Start: 2024-11-16 | End: 2024-11-17

## 2024-11-16 RX ORDER — IOPAMIDOL 755 MG/ML
100 INJECTION, SOLUTION INTRAVASCULAR
Status: COMPLETED | OUTPATIENT
Start: 2024-11-16 | End: 2024-11-16

## 2024-11-16 RX ORDER — DONEPEZIL HYDROCHLORIDE 5 MG/1
5 TABLET, FILM COATED ORAL DAILY
COMMUNITY

## 2024-11-16 RX ADMIN — IOPAMIDOL 100 ML: 755 INJECTION, SOLUTION INTRAVENOUS at 23:56

## 2024-11-16 RX ADMIN — PIPERACILLIN SODIUM AND TAZOBACTAM SODIUM 4500 MG: 4; .5 INJECTION, POWDER, LYOPHILIZED, FOR SOLUTION INTRAVENOUS at 22:59

## 2024-11-16 RX ADMIN — METOPROLOL TARTRATE 5 MG: 5 INJECTION INTRAVENOUS at 23:07

## 2024-11-17 ENCOUNTER — APPOINTMENT (OUTPATIENT)
Facility: HOSPITAL | Age: 79
End: 2024-11-17
Payer: MEDICARE

## 2024-11-17 PROBLEM — J96.01 ACUTE RESPIRATORY FAILURE WITH HYPOXIA: Status: ACTIVE | Noted: 2024-11-17

## 2024-11-17 LAB
ALBUMIN SERPL-MCNC: 2.7 G/DL (ref 3.5–5)
ALBUMIN/GLOB SERPL: 0.8 (ref 1.1–2.2)
ALP SERPL-CCNC: 84 U/L (ref 45–117)
ALT SERPL-CCNC: 2197 U/L (ref 12–78)
ANION GAP SERPL CALC-SCNC: 12 MMOL/L (ref 2–12)
ANION GAP SERPL CALC-SCNC: 9 MMOL/L (ref 2–12)
ANION GAP SERPL CALC-SCNC: 9 MMOL/L (ref 2–12)
APPEARANCE UR: CLEAR
ARTERIAL PATENCY WRIST A: ABNORMAL
AST SERPL W P-5'-P-CCNC: >2000 U/L (ref 15–37)
B PERT DNA SPEC QL NAA+PROBE: NOT DETECTED
BACTERIA URNS QL MICRO: NEGATIVE /HPF
BASE DEFICIT BLDA-SCNC: 5.1 MMOL/L
BASOPHILS # BLD: 0 K/UL (ref 0–0.1)
BASOPHILS NFR BLD: 0 % (ref 0–1)
BDY SITE: ABNORMAL
BILIRUB SERPL-MCNC: 1.1 MG/DL (ref 0.2–1)
BILIRUB UR QL: NEGATIVE
BORDETELLA PARAPERTUSSIS BY PCR: NOT DETECTED
BUN SERPL-MCNC: 22 MG/DL (ref 6–20)
BUN SERPL-MCNC: 29 MG/DL (ref 6–20)
BUN SERPL-MCNC: 31 MG/DL (ref 6–20)
BUN/CREAT SERPL: 13 (ref 12–20)
BUN/CREAT SERPL: 13 (ref 12–20)
BUN/CREAT SERPL: 14 (ref 12–20)
C PNEUM DNA SPEC QL NAA+PROBE: NOT DETECTED
CA-I BLD-MCNC: 1.14 MMOL/L (ref 1.13–1.32)
CA-I BLD-MCNC: 7.9 MG/DL (ref 8.5–10.1)
CA-I BLD-MCNC: 8 MG/DL (ref 8.5–10.1)
CA-I BLD-MCNC: 8.1 MG/DL (ref 8.5–10.1)
CHLORIDE SERPL-SCNC: 109 MMOL/L (ref 97–108)
CHLORIDE SERPL-SCNC: 109 MMOL/L (ref 97–108)
CHLORIDE SERPL-SCNC: 110 MMOL/L (ref 97–108)
CO2 SERPL-SCNC: 21 MMOL/L (ref 21–32)
CO2 SERPL-SCNC: 22 MMOL/L (ref 21–32)
CO2 SERPL-SCNC: 26 MMOL/L (ref 21–32)
COHGB MFR BLD: 0.3 % (ref 1–2)
COLOR UR: ABNORMAL
CREAT SERPL-MCNC: 1.67 MG/DL (ref 0.55–1.02)
CREAT SERPL-MCNC: 2.22 MG/DL (ref 0.55–1.02)
CREAT SERPL-MCNC: 2.25 MG/DL (ref 0.55–1.02)
DATE LAST DOSE: NORMAL
DIFFERENTIAL METHOD BLD: ABNORMAL
DOSE AMOUNT: NORMAL UNITS
EOSINOPHIL # BLD: 0 K/UL (ref 0–0.4)
EOSINOPHIL NFR BLD: 0 % (ref 0–7)
EPITH CASTS URNS QL MICRO: ABNORMAL /LPF
ERYTHROCYTE [DISTWIDTH] IN BLOOD BY AUTOMATED COUNT: 14.2 % (ref 11.5–14.5)
FIO2 ON VENT: 100 %
FLUAV SUBTYP SPEC NAA+PROBE: NOT DETECTED
FLUBV RNA SPEC QL NAA+PROBE: NOT DETECTED
GAS FLOW.O2 SETTING OXYMISER: 16
GLOBULIN SER CALC-MCNC: 3.5 G/DL (ref 2–4)
GLUCOSE BLD STRIP.AUTO-MCNC: 121 MG/DL (ref 65–100)
GLUCOSE BLD STRIP.AUTO-MCNC: 127 MG/DL (ref 65–100)
GLUCOSE BLD STRIP.AUTO-MCNC: 167 MG/DL (ref 65–100)
GLUCOSE BLD STRIP.AUTO-MCNC: 180 MG/DL (ref 65–100)
GLUCOSE BLD STRIP.AUTO-MCNC: 262 MG/DL (ref 65–100)
GLUCOSE BLD STRIP.AUTO-MCNC: 56 MG/DL (ref 65–100)
GLUCOSE BLD STRIP.AUTO-MCNC: 57 MG/DL (ref 65–100)
GLUCOSE BLD STRIP.AUTO-MCNC: 72 MG/DL (ref 65–100)
GLUCOSE SERPL-MCNC: 157 MG/DL (ref 65–100)
GLUCOSE SERPL-MCNC: 175 MG/DL (ref 65–100)
GLUCOSE SERPL-MCNC: 230 MG/DL (ref 65–100)
GLUCOSE UR STRIP.AUTO-MCNC: 50 MG/DL
HADV DNA SPEC QL NAA+PROBE: NOT DETECTED
HCO3 BLDA-SCNC: 20 MMOL/L (ref 22–26)
HCOV 229E RNA SPEC QL NAA+PROBE: NOT DETECTED
HCOV HKU1 RNA SPEC QL NAA+PROBE: NOT DETECTED
HCOV NL63 RNA SPEC QL NAA+PROBE: NOT DETECTED
HCOV OC43 RNA SPEC QL NAA+PROBE: NOT DETECTED
HCT VFR BLD AUTO: 37.4 % (ref 35–47)
HGB BLD-MCNC: 11.4 G/DL (ref 11.5–16)
HGB UR QL STRIP: ABNORMAL
HMPV RNA SPEC QL NAA+PROBE: NOT DETECTED
HPIV1 RNA SPEC QL NAA+PROBE: NOT DETECTED
HPIV2 RNA SPEC QL NAA+PROBE: NOT DETECTED
HPIV3 RNA SPEC QL NAA+PROBE: NOT DETECTED
HPIV4 RNA SPEC QL NAA+PROBE: NOT DETECTED
IMM GRANULOCYTES # BLD AUTO: 0 K/UL
IMM GRANULOCYTES NFR BLD AUTO: 0 %
KETONES UR QL STRIP.AUTO: NEGATIVE MG/DL
LACTATE SERPL-SCNC: 3.6 MMOL/L (ref 0.4–2)
LACTATE SERPL-SCNC: 6.9 MMOL/L (ref 0.4–2)
LACTATE SERPL-SCNC: 8.4 MMOL/L (ref 0.4–2)
LEUKOCYTE ESTERASE UR QL STRIP.AUTO: NEGATIVE
LYMPHOCYTES # BLD: 0.6 K/UL (ref 0.8–3.5)
LYMPHOCYTES NFR BLD: 3 % (ref 12–49)
M PNEUMO DNA SPEC QL NAA+PROBE: NOT DETECTED
MAGNESIUM SERPL-MCNC: 1.7 MG/DL (ref 1.6–2.4)
MAGNESIUM SERPL-MCNC: 1.8 MG/DL (ref 1.6–2.4)
MCH RBC QN AUTO: 30.6 PG (ref 26–34)
MCHC RBC AUTO-ENTMCNC: 30.5 G/DL (ref 30–36.5)
MCV RBC AUTO: 100.5 FL (ref 80–99)
METHGB MFR BLD: 0.5 % (ref 0–1.4)
MONOCYTES # BLD: 0.2 K/UL (ref 0–1)
MONOCYTES NFR BLD: 1 % (ref 5–13)
MRSA DNA SPEC QL NAA+PROBE: NOT DETECTED
NEUTS BAND NFR BLD MANUAL: 11 % (ref 0–6)
NEUTS SEG # BLD: 19.3 K/UL (ref 1.8–8)
NEUTS SEG NFR BLD: 85 % (ref 32–75)
NITRITE UR QL STRIP.AUTO: NEGATIVE
NRBC # BLD: 0.02 K/UL (ref 0–0.01)
NRBC BLD-RTO: 0.1 PER 100 WBC
OXYHGB MFR BLD: 98.6 % (ref 95–99)
PCO2 BLDA: 37 MMHG (ref 35–45)
PEEP RESPIRATORY: 8
PERFORMED BY:: ABNORMAL
PERFORMED BY:: NORMAL
PH BLDA: 7.35 (ref 7.35–7.45)
PH UR STRIP: 5 (ref 5–8)
PHOSPHATE SERPL-MCNC: 3 MG/DL (ref 2.6–4.7)
PHOSPHATE SERPL-MCNC: 4.2 MG/DL (ref 2.6–4.7)
PLATELET # BLD AUTO: 196 K/UL (ref 150–400)
PMV BLD AUTO: 10.5 FL (ref 8.9–12.9)
PO2 BLDA: 292 MMHG (ref 80–100)
POTASSIUM SERPL-SCNC: 4.2 MMOL/L (ref 3.5–5.1)
POTASSIUM SERPL-SCNC: 4.5 MMOL/L (ref 3.5–5.1)
POTASSIUM SERPL-SCNC: 5 MMOL/L (ref 3.5–5.1)
PROT SERPL-MCNC: 6.2 G/DL (ref 6.4–8.2)
PROT UR STRIP-MCNC: 30 MG/DL
RBC # BLD AUTO: 3.72 M/UL (ref 3.8–5.2)
RBC #/AREA URNS HPF: ABNORMAL /HPF (ref 0–5)
RBC MORPH BLD: ABNORMAL
RSV RNA SPEC QL NAA+PROBE: NOT DETECTED
RV+EV RNA SPEC QL NAA+PROBE: NOT DETECTED
S PYO DNA THROAT QL NAA+PROBE: NOT DETECTED
SAO2 % BLD: 99 % (ref 95–99)
SAO2% DEVICE SAO2% SENSOR NAME: ABNORMAL
SARS-COV-2 RNA RESP QL NAA+PROBE: NOT DETECTED
SODIUM SERPL-SCNC: 140 MMOL/L (ref 136–145)
SODIUM SERPL-SCNC: 143 MMOL/L (ref 136–145)
SODIUM SERPL-SCNC: 144 MMOL/L (ref 136–145)
SP GR UR REFRACTOMETRY: 1.02 (ref 1–1.03)
SPECIMEN SITE: ABNORMAL
T4 FREE SERPL-MCNC: 1.6 NG/DL (ref 0.8–1.5)
TROPONIN I SERPL HS-MCNC: 506 NG/L (ref 0–51)
TROPONIN I SERPL HS-MCNC: 640 NG/L (ref 0–51)
TSH SERPL DL<=0.05 MIU/L-ACNC: 2.16 UIU/ML (ref 0.36–3.74)
URINE CULTURE IF INDICATED: ABNORMAL
UROBILINOGEN UR QL STRIP.AUTO: 0.1 EU/DL (ref 0.1–1)
VANCOMYCIN SERPL-MCNC: 15.6 UG/ML
WBC # BLD AUTO: 20.1 K/UL (ref 3.6–11)
WBC URNS QL MICRO: ABNORMAL /HPF (ref 0–4)

## 2024-11-17 PROCEDURE — 2580000003 HC RX 258: Performed by: NURSE PRACTITIONER

## 2024-11-17 PROCEDURE — 6360000002 HC RX W HCPCS: Performed by: STUDENT IN AN ORGANIZED HEALTH CARE EDUCATION/TRAINING PROGRAM

## 2024-11-17 PROCEDURE — 83735 ASSAY OF MAGNESIUM: CPT

## 2024-11-17 PROCEDURE — 80053 COMPREHEN METABOLIC PANEL: CPT

## 2024-11-17 PROCEDURE — 6360000002 HC RX W HCPCS: Performed by: NURSE PRACTITIONER

## 2024-11-17 PROCEDURE — 2580000003 HC RX 258: Performed by: STUDENT IN AN ORGANIZED HEALTH CARE EDUCATION/TRAINING PROGRAM

## 2024-11-17 PROCEDURE — 6360000004 HC RX CONTRAST MEDICATION

## 2024-11-17 PROCEDURE — 80202 ASSAY OF VANCOMYCIN: CPT

## 2024-11-17 PROCEDURE — 2700000000 HC OXYGEN THERAPY PER DAY

## 2024-11-17 PROCEDURE — 93970 EXTREMITY STUDY: CPT

## 2024-11-17 PROCEDURE — 84443 ASSAY THYROID STIM HORMONE: CPT

## 2024-11-17 PROCEDURE — 84100 ASSAY OF PHOSPHORUS: CPT

## 2024-11-17 PROCEDURE — 94660 CPAP INITIATION&MGMT: CPT

## 2024-11-17 PROCEDURE — 87651 STREP A DNA AMP PROBE: CPT

## 2024-11-17 PROCEDURE — 82962 GLUCOSE BLOOD TEST: CPT

## 2024-11-17 PROCEDURE — 2500000003 HC RX 250 WO HCPCS: Performed by: NURSE PRACTITIONER

## 2024-11-17 PROCEDURE — 82803 BLOOD GASES ANY COMBINATION: CPT

## 2024-11-17 PROCEDURE — 81001 URINALYSIS AUTO W/SCOPE: CPT

## 2024-11-17 PROCEDURE — 84439 ASSAY OF FREE THYROXINE: CPT

## 2024-11-17 PROCEDURE — 6370000000 HC RX 637 (ALT 250 FOR IP): Performed by: STUDENT IN AN ORGANIZED HEALTH CARE EDUCATION/TRAINING PROGRAM

## 2024-11-17 PROCEDURE — 2000000000 HC ICU R&B

## 2024-11-17 PROCEDURE — C8929 TTE W OR WO FOL WCON,DOPPLER: HCPCS

## 2024-11-17 PROCEDURE — 94761 N-INVAS EAR/PLS OXIMETRY MLT: CPT

## 2024-11-17 PROCEDURE — 0202U NFCT DS 22 TRGT SARS-COV-2: CPT

## 2024-11-17 PROCEDURE — 87641 MR-STAPH DNA AMP PROBE: CPT

## 2024-11-17 PROCEDURE — 80048 BASIC METABOLIC PNL TOTAL CA: CPT

## 2024-11-17 PROCEDURE — 36600 WITHDRAWAL OF ARTERIAL BLOOD: CPT

## 2024-11-17 PROCEDURE — 93005 ELECTROCARDIOGRAM TRACING: CPT | Performed by: NURSE PRACTITIONER

## 2024-11-17 PROCEDURE — 84484 ASSAY OF TROPONIN QUANT: CPT

## 2024-11-17 PROCEDURE — 83605 ASSAY OF LACTIC ACID: CPT

## 2024-11-17 PROCEDURE — 85025 COMPLETE CBC W/AUTO DIFF WBC: CPT

## 2024-11-17 PROCEDURE — 82330 ASSAY OF CALCIUM: CPT

## 2024-11-17 RX ORDER — SODIUM CHLORIDE 9 MG/ML
INJECTION, SOLUTION INTRAVENOUS PRN
Status: DISCONTINUED | OUTPATIENT
Start: 2024-11-17 | End: 2024-11-23 | Stop reason: HOSPADM

## 2024-11-17 RX ORDER — SODIUM CHLORIDE 0.9 % (FLUSH) 0.9 %
5-40 SYRINGE (ML) INJECTION PRN
Status: DISCONTINUED | OUTPATIENT
Start: 2024-11-17 | End: 2024-11-23 | Stop reason: HOSPADM

## 2024-11-17 RX ORDER — ACETAMINOPHEN 325 MG/1
650 TABLET ORAL EVERY 6 HOURS PRN
Status: DISCONTINUED | OUTPATIENT
Start: 2024-11-17 | End: 2024-11-23 | Stop reason: HOSPADM

## 2024-11-17 RX ORDER — POTASSIUM CHLORIDE 29.8 MG/ML
20 INJECTION INTRAVENOUS PRN
Status: DISCONTINUED | OUTPATIENT
Start: 2024-11-17 | End: 2024-11-23 | Stop reason: HOSPADM

## 2024-11-17 RX ORDER — MAGNESIUM SULFATE IN WATER 40 MG/ML
2000 INJECTION, SOLUTION INTRAVENOUS PRN
Status: DISCONTINUED | OUTPATIENT
Start: 2024-11-17 | End: 2024-11-23 | Stop reason: HOSPADM

## 2024-11-17 RX ORDER — METOPROLOL SUCCINATE 25 MG/1
25 TABLET, EXTENDED RELEASE ORAL DAILY
Status: DISCONTINUED | OUTPATIENT
Start: 2024-11-17 | End: 2024-11-23 | Stop reason: HOSPADM

## 2024-11-17 RX ORDER — POTASSIUM CHLORIDE 7.45 MG/ML
10 INJECTION INTRAVENOUS PRN
Status: DISCONTINUED | OUTPATIENT
Start: 2024-11-17 | End: 2024-11-23 | Stop reason: HOSPADM

## 2024-11-17 RX ORDER — DEXTROSE MONOHYDRATE 100 MG/ML
INJECTION, SOLUTION INTRAVENOUS CONTINUOUS PRN
Status: DISCONTINUED | OUTPATIENT
Start: 2024-11-17 | End: 2024-11-23 | Stop reason: HOSPADM

## 2024-11-17 RX ORDER — HEPARIN SODIUM 5000 [USP'U]/ML
5000 INJECTION, SOLUTION INTRAVENOUS; SUBCUTANEOUS EVERY 8 HOURS SCHEDULED
Status: DISCONTINUED | OUTPATIENT
Start: 2024-11-17 | End: 2024-11-19

## 2024-11-17 RX ORDER — IPRATROPIUM BROMIDE AND ALBUTEROL SULFATE 2.5; .5 MG/3ML; MG/3ML
1 SOLUTION RESPIRATORY (INHALATION) EVERY 4 HOURS PRN
Status: DISCONTINUED | OUTPATIENT
Start: 2024-11-17 | End: 2024-11-23 | Stop reason: HOSPADM

## 2024-11-17 RX ORDER — INSULIN LISPRO 100 [IU]/ML
0-8 INJECTION, SOLUTION INTRAVENOUS; SUBCUTANEOUS
Status: DISCONTINUED | OUTPATIENT
Start: 2024-11-17 | End: 2024-11-23 | Stop reason: HOSPADM

## 2024-11-17 RX ORDER — POLYETHYLENE GLYCOL 3350 17 G/17G
17 POWDER, FOR SOLUTION ORAL DAILY PRN
Status: DISCONTINUED | OUTPATIENT
Start: 2024-11-17 | End: 2024-11-23 | Stop reason: HOSPADM

## 2024-11-17 RX ORDER — ACETAMINOPHEN 650 MG/1
650 SUPPOSITORY RECTAL EVERY 6 HOURS PRN
Status: DISCONTINUED | OUTPATIENT
Start: 2024-11-17 | End: 2024-11-23 | Stop reason: HOSPADM

## 2024-11-17 RX ORDER — GLUCAGON 1 MG/ML
1 KIT INJECTION PRN
Status: DISCONTINUED | OUTPATIENT
Start: 2024-11-17 | End: 2024-11-23 | Stop reason: HOSPADM

## 2024-11-17 RX ORDER — FUROSEMIDE 10 MG/ML
40 INJECTION INTRAMUSCULAR; INTRAVENOUS 2 TIMES DAILY
Status: DISCONTINUED | OUTPATIENT
Start: 2024-11-17 | End: 2024-11-19

## 2024-11-17 RX ORDER — SODIUM CHLORIDE 0.9 % (FLUSH) 0.9 %
5-40 SYRINGE (ML) INJECTION EVERY 12 HOURS SCHEDULED
Status: DISCONTINUED | OUTPATIENT
Start: 2024-11-17 | End: 2024-11-23 | Stop reason: HOSPADM

## 2024-11-17 RX ORDER — ONDANSETRON 2 MG/ML
4 INJECTION INTRAMUSCULAR; INTRAVENOUS EVERY 6 HOURS PRN
Status: DISCONTINUED | OUTPATIENT
Start: 2024-11-17 | End: 2024-11-23 | Stop reason: HOSPADM

## 2024-11-17 RX ORDER — ONDANSETRON 4 MG/1
4 TABLET, ORALLY DISINTEGRATING ORAL EVERY 8 HOURS PRN
Status: DISCONTINUED | OUTPATIENT
Start: 2024-11-17 | End: 2024-11-23 | Stop reason: HOSPADM

## 2024-11-17 RX ORDER — LEVOTHYROXINE SODIUM 75 UG/1
75 TABLET ORAL
Status: DISCONTINUED | OUTPATIENT
Start: 2024-11-18 | End: 2024-11-23 | Stop reason: HOSPADM

## 2024-11-17 RX ORDER — POTASSIUM CHLORIDE 7.45 MG/ML
10 INJECTION INTRAVENOUS ONCE
Status: COMPLETED | OUTPATIENT
Start: 2024-11-17 | End: 2024-11-17

## 2024-11-17 RX ADMIN — SODIUM CHLORIDE: 9 INJECTION, SOLUTION INTRAVENOUS at 02:32

## 2024-11-17 RX ADMIN — FUROSEMIDE 40 MG: 10 INJECTION, SOLUTION INTRAMUSCULAR; INTRAVENOUS at 08:29

## 2024-11-17 RX ADMIN — PIPERACILLIN AND TAZOBACTAM 3375 MG: 3; .375 INJECTION, POWDER, LYOPHILIZED, FOR SOLUTION INTRAVENOUS at 20:33

## 2024-11-17 RX ADMIN — AMIODARONE HYDROCHLORIDE 1 MG/MIN: 1.8 INJECTION, SOLUTION INTRAVENOUS at 02:52

## 2024-11-17 RX ADMIN — SODIUM CHLORIDE, PRESERVATIVE FREE 10 ML: 5 INJECTION INTRAVENOUS at 20:33

## 2024-11-17 RX ADMIN — VANCOMYCIN HYDROCHLORIDE 500 MG: 500 INJECTION, POWDER, LYOPHILIZED, FOR SOLUTION INTRAVENOUS at 23:40

## 2024-11-17 RX ADMIN — METOPROLOL SUCCINATE 25 MG: 25 TABLET, EXTENDED RELEASE ORAL at 18:20

## 2024-11-17 RX ADMIN — DEXTROSE MONOHYDRATE 125 ML: 100 INJECTION, SOLUTION INTRAVENOUS at 08:07

## 2024-11-17 RX ADMIN — VANCOMYCIN HYDROCHLORIDE 1500 MG: 750 INJECTION, POWDER, LYOPHILIZED, FOR SOLUTION INTRAVENOUS at 00:24

## 2024-11-17 RX ADMIN — PIPERACILLIN AND TAZOBACTAM 3375 MG: 3; .375 INJECTION, POWDER, LYOPHILIZED, FOR SOLUTION INTRAVENOUS at 12:10

## 2024-11-17 RX ADMIN — HEPARIN SODIUM 5000 UNITS: 5000 INJECTION INTRAVENOUS; SUBCUTANEOUS at 21:52

## 2024-11-17 RX ADMIN — SODIUM CHLORIDE, PRESERVATIVE FREE 10 ML: 5 INJECTION INTRAVENOUS at 08:12

## 2024-11-17 RX ADMIN — HEPARIN SODIUM 5000 UNITS: 5000 INJECTION INTRAVENOUS; SUBCUTANEOUS at 05:41

## 2024-11-17 RX ADMIN — FUROSEMIDE 40 MG: 10 INJECTION, SOLUTION INTRAMUSCULAR; INTRAVENOUS at 17:17

## 2024-11-17 RX ADMIN — DEXTROSE MONOHYDRATE 250 ML: 100 INJECTION, SOLUTION INTRAVENOUS at 08:41

## 2024-11-17 RX ADMIN — HEPARIN SODIUM 5000 UNITS: 5000 INJECTION INTRAVENOUS; SUBCUTANEOUS at 12:09

## 2024-11-17 RX ADMIN — POTASSIUM CHLORIDE 10 MEQ: 7.46 INJECTION, SOLUTION INTRAVENOUS at 02:33

## 2024-11-17 RX ADMIN — PIPERACILLIN AND TAZOBACTAM 3375 MG: 3; .375 INJECTION, POWDER, LYOPHILIZED, FOR SOLUTION INTRAVENOUS at 05:15

## 2024-11-17 RX ADMIN — PERFLUTREN 2 ML: 6.52 INJECTION, SUSPENSION INTRAVENOUS at 14:39

## 2024-11-17 ASSESSMENT — PAIN SCALES - GENERAL: PAINLEVEL_OUTOF10: 0

## 2024-11-17 NOTE — ED PROVIDER NOTES
Josi APRN - CNP        0.9 % sodium chloride infusion   IntraVENous PRN Josi Holloway APRN - CNP 5 mL/hr at 11/17/24 0232 New Bag at 11/17/24 0232    ondansetron (ZOFRAN-ODT) disintegrating tablet 4 mg  4 mg Oral Q8H PRN Josi Holloway APRN - CNP        Or    ondansetron (ZOFRAN) injection 4 mg  4 mg IntraVENous Q6H PRN Josi Holloway APRN - CNP        polyethylene glycol (GLYCOLAX) packet 17 g  17 g Oral Daily PRN Josi Holloway APRN - CNP        acetaminophen (TYLENOL) tablet 650 mg  650 mg Oral Q6H PRN Josi Holloway APRN - CNP        Or    acetaminophen (TYLENOL) suppository 650 mg  650 mg Rectal Q6H PRN Josi Holloway, APRN - CNP        potassium chloride 20 mEq/50 mL IVPB (Central Line)  20 mEq IntraVENous PRN Josi Holloway APRN - CNP        Or    potassium chloride 10 mEq/100 mL IVPB (Peripheral Line)  10 mEq IntraVENous PRN Josi Holloway, APRN - CNP        magnesium sulfate 2000 mg in 50 mL IVPB premix  2,000 mg IntraVENous PRN Josi Holloway APRN - CNP        sodium phosphate 15 mmol in sodium chloride 0.9 % 250 mL IVPB  15 mmol IntraVENous PRN Josi Holloway, APRN - CNP        heparin (porcine) injection 5,000 Units  5,000 Units SubCUTAneous 3 times per day Josi Holloway APRN - CNP   5,000 Units at 11/17/24 0541    ipratropium 0.5 mg-albuterol 2.5 mg (DUONEB) nebulizer solution 1 Dose  1 Dose Inhalation Q4H PRN Josi Holloway APRN - CNP        piperacillin-tazobactam (ZOSYN) 3,375 mg in sodium chloride 0.9 % 50 mL IVPB (mini-bag)  3,375 mg IntraVENous Q8H Josi Holloway APRN - CNP 12.5 mL/hr at 11/17/24 0515 3,375 mg at 11/17/24 0515    insulin lispro (HUMALOG,ADMELOG) injection vial 0-8 Units  0-8 Units SubCUTAneous 4x Daily AC & HS Josi Holloway, APRN - CNP        glucose chewable tablet 16 g  4 tablet Oral PRN Josi Holloway, APRN - CNP        dextrose bolus 10% 125 mL  125 mL IntraVENous PRN Fermín Hollowaya, APRN - CNP        Or    dextrose bolus 10% 250 mL  250 mL IntraVENous PRN  Supervision of Care  INTERPRETATION -  Xrays, CT Scan, ECG, and Blood Pressure  INTERVENTIONS - hemodynamic mgmt and respiratory mgmt  CASE REVIEW - Hospitalist/Intensivist  TREATMENT RESPONSE -Stable  PERFORMED BY - Self    NOTES:  I have spent 40 minutes of critical care time involved in lab review, consultations with specialist, family decision- making, bedside attention and documentation. Time is exclusive of EKG interpretation, imaging interpretation and separately billed procedures.  During this entire length of time I was immediately available to the patient.  Vishal Gardiner MD    ED FINAL IMPRESSION     1. Acute on chronic congestive heart failure, unspecified heart failure type (HCC)    2. Respiratory distress    3. Atrial fibrillation with rapid ventricular response (HCC)    4. Hypoxia          DISPOSITION/PLAN   DISPOSITION Admitted 11/17/2024 12:22:24 AM         Admit Note: Pt is being admitted by ICU. The results of their tests and reason(s) for their admission have been discussed with pt and/or available family. They convey agreement and understanding for the need to be admitted and for the admission diagnosis.     PATIENT REFERRED TO:  No follow-up provider specified.      DISCHARGE MEDICATIONS:     Medication List        ASK your doctor about these medications      clopidogrel 75 MG tablet  Commonly known as: PLAVIX     donepezil 5 MG tablet  Commonly known as: ARICEPT     Eliquis 2.5 MG Tabs tablet  Generic drug: apixaban     levothyroxine 50 MCG tablet  Commonly known as: SYNTHROID     metoprolol succinate 25 MG extended release tablet  Commonly known as: TOPROL XL  Take 1 tablet by mouth daily     rosuvastatin 20 MG tablet  Commonly known as: CRESTOR  Take 1 tablet by mouth nightly                DISCONTINUED MEDICATIONS:  Current Discharge Medication List        STOP taking these medications       empagliflozin (JARDIANCE) 10 MG tablet Comments:   Reason for Stopping:         midodrine

## 2024-11-17 NOTE — PROGRESS NOTES
Vancomycin Dosing Consult  Kimber Morris is a 78 y.o. female with Pneumonia (CAP). Pharmacy was consulted by Josi Holloway NP to dose and monitor vancomycin. Today is day 1.    Antibiotic regimen: Vancomycin + Zosyn    Temp (24hrs), Av.5 °F (35.8 °C), Min:96.2 °F (35.7 °C), Max:96.8 °F (36 °C)    Recent Labs     24  2235   WBC 13.2*   CREATININE 1.67*   BUN 20     Est CrCl: 22 mL/min  Concomitant nephrotoxic drugs: Loop diuretics and Contrast agents    Cultures:    Blood - Pending    MRSA Swab: Not ordered, patient already received first dose of vancomycin    Target range: Re-dose for random level <15 mcg/mL    Recent level history:  Date/Time Dose & Interval Measured Level (mcg/mL) Associated AUC/LIBRA              Assessment/Plan:   Pulse dose Vancomycin for now due to ERUM. Pt received an initial dose of Vancomycin 1500 mg iv x 1 earlier this morning. A level has been scheduled for [employer] @ 2100. Pharmacy will evaluate and re-dose if level is < 15  Antimicrobial stop date 5 days

## 2024-11-17 NOTE — PLAN OF CARE
Problem: Cardiovascular - Adult  Goal: Maintains optimal cardiac output and hemodynamic stability  Recent Flowsheet Documentation  Taken 11/17/2024 0800 by Jeanette Frederick RN  Maintains optimal cardiac output and hemodynamic stability:   Monitor blood pressure and heart rate   Monitor urine output and notify Licensed Independent Practitioner for values outside of normal range   Assess for signs of decreased cardiac output   Administer fluid and/or volume expanders as ordered   Administer vasoactive medications as ordered  Goal: Absence of cardiac dysrhythmias or at baseline  Recent Flowsheet Documentation  Taken 11/17/2024 0800 by Jeanette Frederick RN  Absence of cardiac dysrhythmias or at baseline:   Monitor cardiac rate and rhythm   Assess for signs of decreased cardiac output   Administer antiarrhythmia medication and electrolyte replacement as ordered     Problem: Musculoskeletal - Adult  Goal: Return mobility to safest level of function  Recent Flowsheet Documentation  Taken 11/17/2024 0800 by Jeanette Frederick RN  Return Mobility to Safest Level of Function:   Assess patient stability and activity tolerance for standing, transferring and ambulating with or without assistive devices   Assist with transfers and ambulation using safe patient handling equipment as needed   Ensure adequate protection for wounds/incisions during mobilization   Obtain physical therapy/occupational therapy consults as needed   Apply continuous passive motion per provider or physical therapy orders to increase flexion toward goal   Instruct patient/family in ordered activity level  Goal: Return ADL status to a safe level of function  Recent Flowsheet Documentation  Taken 11/17/2024 0800 by Jeanette Frederick RN  Return ADL Status to a Safe Level of Function:   Administer medication as ordered   Assess activities of daily living deficits and provide assistive devices as needed   Obtain physical therapy/occupational therapy consults as needed

## 2024-11-17 NOTE — ED NOTES
POC Lactic 7.5   MD Gardiner at bedside and made aware. Sepsis alert called    POC ABG reviewed by MD.

## 2024-11-17 NOTE — ED NOTES
Patient report given to LAURA Werner and patient transferred to ICU without issue. Resp present for transfer

## 2024-11-17 NOTE — CARE COORDINATION
11/17/24 1613   Service Assessment   Patient Orientation Unable to Assess   Cognition Other (see comment)  (respiratory equipment in use)   History Provided By Child/Family   Primary Caregiver Family   Support Systems Spouse/Significant Other;Children   PCP Verified by CM Yes   Last Visit to PCP Within last 3 months   Prior Functional Level Assistance with the following:;Cooking;Housework;Shopping;Bathing;Dressing   Current Functional Level Assistance with the following:;Bathing;Dressing;Cooking;Housework;Shopping   Can patient return to prior living arrangement Yes   Ability to make needs known: Good   Family able to assist with home care needs: Yes   Would you like for me to discuss the discharge plan with any other family members/significant others, and if so, who? Yes  (Spoke with pts daughter and latasha Kurtz@ [phone])   Financial Resources Medicare   Community Resources None     Met with the pt at bedside.    Currently unable to fully participate with DC planning.  Spoke with the pts daughter Hleena Kurtz.  She stated that the pt lives with her and extended family at the address listed.  The pt is expected to return to that same address at [place].  Ms Kurtz will provide transport at [place].    Stated the pt is usually ind with adls though needs occasional assist.  Denies DME and community assist.    CM to follow for needs.  Advance Care Planning     General Advance Care Planning (ACP) Conversation    Date of Conversation: 11/17/2024  Conducted with: Patient with Decision Making Capacity and Legal next of kin  Other persons present: Daughter      Healthcare Decision Maker: No healthcare decision makers have been documented.       Content/Action Overview:  Has ACP document(s) on file - reflects the patient's care preferences  Reviewed DNR/DNI and patient elects Full Code (Attempt Resuscitation)        Length of Voluntary ACP Conversation in minutes:  <16 minutes (Non-Billable)    Diann

## 2024-11-17 NOTE — H&P
CRITICAL CARE ADMISSION NOTE    Name: Kimber Morris   : 1945   MRN: [id number]   Date: 2024      Diagnoses/problem list:     Acute Respiratory failure with hypoxia  CHF Exacerbation  Pneumonia (possible aspiration)  Lactic acidosis  Acute kidney injury  Atrial fibrillation  DM 2  Hypothyroidism  Hypertension  GERD    HPI     Ms. Morris is a 78-year-old female with medical history significant for CHF, A-fib, DM2, HTN, hypothyroidism, asthma, and GERD.  Patient presented to the ED today via EMS with complaint of abdominal pain and shortness of breath.  EMS noted patient to be hypoxic with oxygen saturation of 80% on room air.  They placed her on BiPAP and administered 40 mg IV Lasix and 150 mg bolus of amiodarone.     In the ED, CBC reflected WBC 13.2 with hemoglobin 12.1 and hematocrit 39.9.  D-dimer was elevated at 3.41.  Lactic acid elevated at 7.5 with repeat 5.0.  Creatinine 1.67 with BUN of 20, sodium 143, potassium 3.3, and chloride 110.  Glucose elevated at 301.      CT chest was performed due to concern for PE in the setting of acute hypoxia.  CT was negative for pulmonary embolism but revealed severe bilateral pneumonia with superimposed pulmonary edema.  CT also noted airway debris which would support diagnosis of aspiration pneumonia.  There were trace bilateral pleural effusions noted.    Ms. Morris is admitted to the ICU for hemodynamic monitoring and medical management of acute respiratory failure with hypoxia felt secondary to exacerbation of heart failure, atrial fibrillation with RVR, and aspiration pneumonia.      REVIEW OF SYSTEMS: ROS difficult to obtain secondary to patient being on continuous BiPAP and being somewhat somnolent.  She was able to let me know that she had been experiencing some abdominal pain. Patient's daughter reported that the patient's abdominal pain has been accompanied by some nausea and vomiting    Assessment and plan:     NEUROLOGICAL:    Somnolent however awakens  Ppx  If patient remains on BiPap, consider transition to heparin driip  Patient's daughter reports that patient recently placed on Eliquis.    ID/MICRO:   Pneumonia  Leukocytosis with W BC 13.2  CXR reflects pneumonia  Blood cultures ordered and pending  Continue antibiotic coverage with IV Zosyn and IV vancomycin    ICU DAILY CHECKLIST     Code Status: FULL  DVT Prophylaxis: HEPARIN subcu  T/L/D: PIV's, BiPAP  Diet: NPO  Activity Level: Activity as tolerated  ABCDEF Bundle/Checklist Completed: Yes  Disposition: Admitted to ICU  Multidisciplinary Rounds Completed: Pending  Patient/Family Updated: Yes    Past Medical History:      has a past medical history of Asthma, Chest discomfort, CHF (congestive heart failure) (HCC), DM (diabetes mellitus) (HCC), GERD (gastroesophageal reflux disease), HTN (hypertension), and Thyroid disease.    Past Surgical History:      has a past surgical history that includes Mohs surgery and Hysterectomy.    Home Medications:     Prior to Admission medications    Medication Sig Start Date End Date Taking? Authorizing Provider   donepezil (ARICEPT) 5 MG tablet Take 1 tablet by mouth nightly   Yes ProviderTomer MD   apixaban (ELIQUIS) 2.5 MG TABS tablet Take 1 tablet by mouth 2 times daily   Yes ProviderTomer MD   rosuvastatin (CRESTOR) 20 MG tablet Take 1 tablet by mouth nightly 5/15/24  Yes Levi Amador MD   metoprolol succinate (TOPROL XL) 25 MG extended release tablet Take 1 tablet by mouth daily 5/16/24  Yes Levi Amador MD   clopidogrel (PLAVIX) 75 MG tablet Take 1 tablet by mouth daily   Yes Automatic Reconciliation, Ar   levothyroxine (SYNTHROID) 50 MCG tablet Take 1.5 tablets by mouth every morning (before breakfast) 4/21/16   Automatic Reconciliation, Ar       Allergies/Social/Family History:     No Known Allergies   Social History     Tobacco Use    Smoking status: Never    Smokeless tobacco: Never   Substance Use Topics    Alcohol use: No

## 2024-11-17 NOTE — ED TRIAGE NOTES
Patient called EMS for abdominal pain starting tonight. Upon arrival of EMS patient was weak and noted with rales. 80% RA and hypertensive. 150 Amio and 40 Lasix given. Currently 92% upon arrival to hospital with bipap  Hx CHF,   Glucose 319 per EMS

## 2024-11-18 LAB
ALBUMIN SERPL-MCNC: 2.4 G/DL (ref 3.5–5)
ALBUMIN/GLOB SERPL: 0.7 (ref 1.1–2.2)
ALP SERPL-CCNC: 70 U/L (ref 45–117)
ALT SERPL-CCNC: 1749 U/L (ref 12–78)
ANION GAP SERPL CALC-SCNC: 7 MMOL/L (ref 2–12)
AST SERPL W P-5'-P-CCNC: >2000 U/L (ref 15–37)
BASOPHILS # BLD: 0 K/UL (ref 0–0.1)
BASOPHILS NFR BLD: 0 % (ref 0–1)
BILIRUB SERPL-MCNC: 1.1 MG/DL (ref 0.2–1)
BUN SERPL-MCNC: 35 MG/DL (ref 6–20)
BUN/CREAT SERPL: 15 (ref 12–20)
CA-I BLD-MCNC: 8.2 MG/DL (ref 8.5–10.1)
CHLORIDE SERPL-SCNC: 111 MMOL/L (ref 97–108)
CO2 SERPL-SCNC: 27 MMOL/L (ref 21–32)
CREAT SERPL-MCNC: 2.33 MG/DL (ref 0.55–1.02)
DATE LAST DOSE: NORMAL
DIFFERENTIAL METHOD BLD: ABNORMAL
DOSE AMOUNT: NORMAL UNITS
ECHO AO ASC DIAM: 2.7 CM
ECHO AO ASCENDING AORTA INDEX: 1.66 CM/M2
ECHO AO ROOT DIAM: 3.1 CM
ECHO AO ROOT INDEX: 1.9 CM/M2
ECHO BSA: 1.65 M2
ECHO BSA: 1.65 M2
ECHO LA AREA 4C: 21.8 CM2
ECHO LA DIAMETER INDEX: 2.7 CM/M2
ECHO LA DIAMETER: 4.4 CM
ECHO LA MAJOR AXIS: 6.1 CM
ECHO LA TO AORTIC ROOT RATIO: 1.42
ECHO LA VOL MOD A4C: 61 ML (ref 22–52)
ECHO LA VOLUME INDEX MOD A4C: 37 ML/M2 (ref 16–34)
ECHO LV EDV A4C: 102 ML
ECHO LV EDV INDEX A4C: 63 ML/M2
ECHO LV EF PHYSICIAN: 25 %
ECHO LV EJECTION FRACTION A4C: 39 %
ECHO LV ESV A4C: 62 ML
ECHO LV ESV INDEX A4C: 38 ML/M2
ECHO LV FRACTIONAL SHORTENING: 7 % (ref 28–44)
ECHO LV INTERNAL DIMENSION DIASTOLE INDEX: 2.52 CM/M2
ECHO LV INTERNAL DIMENSION DIASTOLIC: 4.1 CM (ref 3.9–5.3)
ECHO LV INTERNAL DIMENSION SYSTOLIC INDEX: 2.33 CM/M2
ECHO LV INTERNAL DIMENSION SYSTOLIC: 3.8 CM
ECHO LV IVSD: 1.3 CM (ref 0.6–0.9)
ECHO LV MASS 2D: 193.5 G (ref 67–162)
ECHO LV MASS INDEX 2D: 118.7 G/M2 (ref 43–95)
ECHO LV POSTERIOR WALL DIASTOLIC: 1.3 CM (ref 0.6–0.9)
ECHO LV RELATIVE WALL THICKNESS RATIO: 0.63
ECHO LVOT AREA: 2.8 CM2
ECHO LVOT DIAM: 1.9 CM
ECHO LVOT MEAN GRADIENT: 1 MMHG
ECHO LVOT PEAK GRADIENT: 1 MMHG
ECHO LVOT PEAK VELOCITY: 0.6 M/S
ECHO LVOT STROKE VOLUME INDEX: 16.3 ML/M2
ECHO LVOT SV: 26.6 ML
ECHO LVOT VTI: 9.4 CM
ECHO MV A VELOCITY: 0.49 M/S
ECHO MV AREA VTI: 1.3 CM2
ECHO MV E DECELERATION TIME (DT): 137 MS
ECHO MV E VELOCITY: 0.98 M/S
ECHO MV E/A RATIO: 2
ECHO MV LVOT VTI INDEX: 2.24
ECHO MV MAX VELOCITY: 1.2 M/S
ECHO MV MEAN GRADIENT: 3 MMHG
ECHO MV MEAN VELOCITY: 0.8 M/S
ECHO MV PEAK GRADIENT: 6 MMHG
ECHO MV REGURGITANT PEAK GRADIENT: 117 MMHG
ECHO MV REGURGITANT PEAK VELOCITY: 5.4 M/S
ECHO MV REGURGITANT VTIA: 189 CM
ECHO MV VTI: 21.1 CM
ECHO PV MAX VELOCITY: 0.7 M/S
ECHO PV MEAN GRADIENT: 1 MMHG
ECHO PV MEAN VELOCITY: 0.5 M/S
ECHO PV PEAK GRADIENT: 2 MMHG
ECHO PV VTI: 14 CM
ECHO RA AREA 4C: 16.2 CM2
ECHO RA END SYSTOLIC VOLUME APICAL 4 CHAMBER INDEX BSA: 25 ML/M2
ECHO RA VOLUME: 41 ML
ECHO RV BASAL DIMENSION: 3.6 CM
ECHO RV FREE WALL PEAK S': 6.8 CM/S
ECHO RV TAPSE: 1.2 CM (ref 1.7–?)
ECHO TV REGURGITANT MAX VELOCITY: 2.74 M/S
ECHO TV REGURGITANT PEAK GRADIENT: 30 MMHG
ECHO TV REGURGITANT VTI: 92.4 CM
EKG ATRIAL RATE: 122 BPM
EKG ATRIAL RATE: 51 BPM
EKG DIAGNOSIS: NORMAL
EKG DIAGNOSIS: NORMAL
EKG P-R INTERVAL: 208 MS
EKG Q-T INTERVAL: 364 MS
EKG Q-T INTERVAL: 376 MS
EKG QRS DURATION: 66 MS
EKG QRS DURATION: 96 MS
EKG QTC CALCULATION (BAZETT): 539 MS
EKG QTC CALCULATION (BAZETT): 557 MS
EKG R AXIS: -29 DEGREES
EKG R AXIS: 5 DEGREES
EKG T AXIS: -19 DEGREES
EKG T AXIS: 117 DEGREES
EKG VENTRICULAR RATE: 132 BPM
EKG VENTRICULAR RATE: 132 BPM
EOSINOPHIL # BLD: 0 K/UL (ref 0–0.4)
EOSINOPHIL NFR BLD: 0 % (ref 0–7)
ERYTHROCYTE [DISTWIDTH] IN BLOOD BY AUTOMATED COUNT: 14.3 % (ref 11.5–14.5)
GLOBULIN SER CALC-MCNC: 3.3 G/DL (ref 2–4)
GLUCOSE BLD STRIP.AUTO-MCNC: 115 MG/DL (ref 65–100)
GLUCOSE BLD STRIP.AUTO-MCNC: 57 MG/DL (ref 65–100)
GLUCOSE BLD STRIP.AUTO-MCNC: 62 MG/DL (ref 65–100)
GLUCOSE BLD STRIP.AUTO-MCNC: 67 MG/DL (ref 65–100)
GLUCOSE BLD STRIP.AUTO-MCNC: 77 MG/DL (ref 65–100)
GLUCOSE BLD STRIP.AUTO-MCNC: 85 MG/DL (ref 65–100)
GLUCOSE SERPL-MCNC: 103 MG/DL (ref 65–100)
HCT VFR BLD AUTO: 30.7 % (ref 35–47)
HGB BLD-MCNC: 9.9 G/DL (ref 11.5–16)
IMM GRANULOCYTES # BLD AUTO: 0.2 K/UL (ref 0–0.04)
IMM GRANULOCYTES NFR BLD AUTO: 1 % (ref 0–0.5)
LYMPHOCYTES # BLD: 1.2 K/UL (ref 0.8–3.5)
LYMPHOCYTES NFR BLD: 6 % (ref 12–49)
MAGNESIUM SERPL-MCNC: 1.8 MG/DL (ref 1.6–2.4)
MCH RBC QN AUTO: 31.5 PG (ref 26–34)
MCHC RBC AUTO-ENTMCNC: 32.2 G/DL (ref 30–36.5)
MCV RBC AUTO: 97.8 FL (ref 80–99)
MONOCYTES # BLD: 0.7 K/UL (ref 0–1)
MONOCYTES NFR BLD: 3 % (ref 5–13)
NEUTS SEG # BLD: 19.4 K/UL (ref 1.8–8)
NEUTS SEG NFR BLD: 90 % (ref 32–75)
NRBC # BLD: 0 K/UL (ref 0–0.01)
NRBC BLD-RTO: 0 PER 100 WBC
PERFORMED BY:: ABNORMAL
PERFORMED BY:: NORMAL
PHOSPHATE SERPL-MCNC: 4 MG/DL (ref 2.6–4.7)
PLATELET # BLD AUTO: 155 K/UL (ref 150–400)
PMV BLD AUTO: 10.8 FL (ref 8.9–12.9)
POTASSIUM SERPL-SCNC: 4.4 MMOL/L (ref 3.5–5.1)
PROCALCITONIN SERPL-MCNC: 54.68 NG/ML
PROT SERPL-MCNC: 5.7 G/DL (ref 6.4–8.2)
RBC # BLD AUTO: 3.14 M/UL (ref 3.8–5.2)
SODIUM SERPL-SCNC: 145 MMOL/L (ref 136–145)
VANCOMYCIN SERPL-MCNC: 15.2 UG/ML
WBC # BLD AUTO: 21.5 K/UL (ref 3.6–11)

## 2024-11-18 PROCEDURE — 2700000000 HC OXYGEN THERAPY PER DAY

## 2024-11-18 PROCEDURE — 6360000002 HC RX W HCPCS: Performed by: NURSE PRACTITIONER

## 2024-11-18 PROCEDURE — 85025 COMPLETE CBC W/AUTO DIFF WBC: CPT

## 2024-11-18 PROCEDURE — 93970 EXTREMITY STUDY: CPT | Performed by: SURGERY

## 2024-11-18 PROCEDURE — 84100 ASSAY OF PHOSPHORUS: CPT

## 2024-11-18 PROCEDURE — 2000000000 HC ICU R&B

## 2024-11-18 PROCEDURE — 94761 N-INVAS EAR/PLS OXIMETRY MLT: CPT

## 2024-11-18 PROCEDURE — 2580000003 HC RX 258: Performed by: NURSE PRACTITIONER

## 2024-11-18 PROCEDURE — 36415 COLL VENOUS BLD VENIPUNCTURE: CPT

## 2024-11-18 PROCEDURE — 80053 COMPREHEN METABOLIC PANEL: CPT

## 2024-11-18 PROCEDURE — 80202 ASSAY OF VANCOMYCIN: CPT

## 2024-11-18 PROCEDURE — 84145 PROCALCITONIN (PCT): CPT

## 2024-11-18 PROCEDURE — 2580000003 HC RX 258: Performed by: STUDENT IN AN ORGANIZED HEALTH CARE EDUCATION/TRAINING PROGRAM

## 2024-11-18 PROCEDURE — 6370000000 HC RX 637 (ALT 250 FOR IP): Performed by: STUDENT IN AN ORGANIZED HEALTH CARE EDUCATION/TRAINING PROGRAM

## 2024-11-18 PROCEDURE — 82962 GLUCOSE BLOOD TEST: CPT

## 2024-11-18 PROCEDURE — 83735 ASSAY OF MAGNESIUM: CPT

## 2024-11-18 PROCEDURE — 5A09357 ASSISTANCE WITH RESPIRATORY VENTILATION, LESS THAN 24 CONSECUTIVE HOURS, CONTINUOUS POSITIVE AIRWAY PRESSURE: ICD-10-PCS | Performed by: STUDENT IN AN ORGANIZED HEALTH CARE EDUCATION/TRAINING PROGRAM

## 2024-11-18 PROCEDURE — 6360000002 HC RX W HCPCS: Performed by: STUDENT IN AN ORGANIZED HEALTH CARE EDUCATION/TRAINING PROGRAM

## 2024-11-18 RX ADMIN — FUROSEMIDE 40 MG: 10 INJECTION, SOLUTION INTRAMUSCULAR; INTRAVENOUS at 08:17

## 2024-11-18 RX ADMIN — PIPERACILLIN AND TAZOBACTAM 3375 MG: 3; .375 INJECTION, POWDER, LYOPHILIZED, FOR SOLUTION INTRAVENOUS at 14:47

## 2024-11-18 RX ADMIN — METOPROLOL SUCCINATE 25 MG: 25 TABLET, EXTENDED RELEASE ORAL at 08:17

## 2024-11-18 RX ADMIN — HEPARIN SODIUM 5000 UNITS: 5000 INJECTION INTRAVENOUS; SUBCUTANEOUS at 06:05

## 2024-11-18 RX ADMIN — SODIUM CHLORIDE, PRESERVATIVE FREE 10 ML: 5 INJECTION INTRAVENOUS at 08:17

## 2024-11-18 RX ADMIN — HEPARIN SODIUM 5000 UNITS: 5000 INJECTION INTRAVENOUS; SUBCUTANEOUS at 14:43

## 2024-11-18 RX ADMIN — VANCOMYCIN HYDROCHLORIDE 500 MG: 500 INJECTION, POWDER, LYOPHILIZED, FOR SOLUTION INTRAVENOUS at 19:54

## 2024-11-18 RX ADMIN — SODIUM CHLORIDE, PRESERVATIVE FREE 10 ML: 5 INJECTION INTRAVENOUS at 19:54

## 2024-11-18 RX ADMIN — PIPERACILLIN AND TAZOBACTAM 3375 MG: 3; .375 INJECTION, POWDER, LYOPHILIZED, FOR SOLUTION INTRAVENOUS at 21:01

## 2024-11-18 RX ADMIN — HEPARIN SODIUM 5000 UNITS: 5000 INJECTION INTRAVENOUS; SUBCUTANEOUS at 21:58

## 2024-11-18 RX ADMIN — PIPERACILLIN AND TAZOBACTAM 3375 MG: 3; .375 INJECTION, POWDER, LYOPHILIZED, FOR SOLUTION INTRAVENOUS at 06:06

## 2024-11-18 RX ADMIN — FUROSEMIDE 40 MG: 10 INJECTION, SOLUTION INTRAMUSCULAR; INTRAVENOUS at 18:25

## 2024-11-18 ASSESSMENT — PAIN SCALES - GENERAL
PAINLEVEL_OUTOF10: 0
PAINLEVEL_OUTOF10: 0

## 2024-11-18 NOTE — PLAN OF CARE
Problem: Safety - Adult  Goal: Free from fall injury  Outcome: Progressing     Problem: Chronic Conditions and Co-morbidities  Goal: Patient's chronic conditions and co-morbidity symptoms are monitored and maintained or improved  Outcome: Progressing  Flowsheets (Taken 11/18/2024 0730)  Care Plan - Patient's Chronic Conditions and Co-Morbidity Symptoms are Monitored and Maintained or Improved:   Monitor and assess patient's chronic conditions and comorbid symptoms for stability, deterioration, or improvement   Collaborate with multidisciplinary team to address chronic and comorbid conditions and prevent exacerbation or deterioration   Update acute care plan with appropriate goals if chronic or comorbid symptoms are exacerbated and prevent overall improvement and discharge     Problem: Discharge Planning  Goal: Discharge to home or other facility with appropriate resources  Outcome: Progressing  Flowsheets (Taken 11/18/2024 0730)  Discharge to home or other facility with appropriate resources:   Identify barriers to discharge with patient and caregiver   Arrange for needed discharge resources and transportation as appropriate   Identify discharge learning needs (meds, wound care, etc)   Refer to discharge planning if patient needs post-hospital services based on physician order or complex needs related to functional status, cognitive ability or social support system     Problem: Skin/Tissue Integrity  Goal: Absence of new skin breakdown  Description: 1.  Monitor for areas of redness and/or skin breakdown  2.  Assess vascular access sites hourly  3.  Every 4-6 hours minimum:  Change oxygen saturation probe site  4.  Every 4-6 hours:  If on nasal continuous positive airway pressure, respiratory therapy assess nares and determine need for appliance change or resting period.  Outcome: Progressing     Problem: Anxiety  Goal: Will report anxiety at manageable levels  Description: INTERVENTIONS:  1. Administer medication

## 2024-11-18 NOTE — PROGRESS NOTES
[facility] Progress Note        Kimber Morris  1945  [id number]  11/18/2024      Brief HPI / Hospital Course:  Ms. Morris is a 78-year-old female with PMH HFrEF, DM2, hypothyroidism, who presented for shortness of breath and found to be hypoxic.  Initial workup concerning for pneumonia versus pulm edema.  Respiratory failure improved with BiPAP and patient subsequently admitted to ICU for further evaluation management.  Patient was continued on BiPAP and started on diuresis.  Of note patient was also in A-fib RVR requiring amiodarone bolus.  Amiodarone socially discontinued upon quick resolution of RVR.  Antibiotics otherwise initiated for possible pneumonia.    11/17: Symptomatic improvement of respiratory failure.  Responding well to diuresis.  Able to de-escalate off BiPAP onto nasal cannula  11/18: Tolerated nasal cannula throughout the night.  Will continue with diuretics and antibiotics.  With his continued improvement anticipate transfer out of ICU within the next 1 to 2 days.      Assessment and plan:  Neuro  No acute issues    CV  A-fib RVR, improved  Acute on chronic HFrEF (NYHA III, AHA C)  - Tolerating diuresis with Lasix 40 mg IV twice daily; able to wean oxygen support appropriately  - Rate controlled with amiodarone bolus.  Loading infusion subsequent discontinued given appropriate control and escalating LFTs  - Restarted home metoprolol  - Given borderline blood pressures will avoid home hydralazine and spironolactone.  Appears patient required midodrine to tolerate these medications. Do not anticipate restarting home antihypertensives if intolerant of regimen  - Home Jardiance also being held; can likely be restarted upon discharge and/or transfer out of ICU  - Currently holding home Eliquis but anticipate restarting upon continued improvement of renal function.    Pulm  Pulmonary edema  Pneumonia  - Diuresis with Lasix 40 mg IV twice daily ongoing; able to wean oxygen support  appropriately  - Antibiotics as below  - Weaning oxygen support, now on nasal cannula; s/p continuous BiPAP - now PRN    Renal  ERUM on CKD 3  - Diuresis ongoing as above    GI  No acute issues    ID  Pneumonia, as above  - Antibiotics for pneumonia with Zosyn and vancomycin.  Bandemia noted on presentation makes me more suspicious for infectious etiology.    Heme-onc  On antithrombotics  - Chart review reveals patient is on triple therapy with aspirin, Plavix, and Eliquis.  Eliquis currently held due to renal dysfunction but anticipate restarting anticoagulation shortly.  Overall though unsure of need for triple therapy and awaiting family's medication list to clarify regimen      ICU DAILY CHECKLIST     Code Status:  Full Code  DVT Prophylaxis: UFH 5000 U q8h  T/L/D: PIV  GI ppx: n/a  Diet:  ADULT DIET; Dysphagia - Soft and Bite Sized; 3 carb choices (45 gm/meal); Low Fat/Low Chol/High Fiber/MARLON; Low Potassium (Less than 3000 mg/day)  Activity Level: as tolerated   ABCDEF Bundle/Checklist Completed: Yes  Disposition: ICU  Multidisciplinary Rounds Completed: Yes  Goals of Care Discussion/Palliative:  Yes  Patient/Family Updated: Yes      OBJECTIVE  Vitals:    11/18/24 0900 11/18/24 0945 11/18/24 1000 11/18/24 1100   BP: (!) 149/92  132/78 (!) 152/68   Pulse: 70 75 73 74   Resp: 25 19 16 21   Temp:    99.1 °F (37.3 °C)   TempSrc:    Rectal   SpO2: 97% 97% (!) 81% 97%   Weight:       Height:           EXAM:   Physical Exam  Vitals reviewed.   Constitutional:       General: She is not in acute distress.     Appearance: She is normal weight. She is not ill-appearing, toxic-appearing or diaphoretic.   HENT:      Head: Normocephalic and atraumatic.      Nose: Nose normal.      Mouth/Throat:      Mouth: Mucous membranes are moist.   Eyes:      Extraocular Movements: Extraocular movements intact.      Conjunctiva/sclera: Conjunctivae normal.   Cardiovascular:      Rate and Rhythm: Normal rate. Rhythm irregular.      Pulses:

## 2024-11-18 NOTE — PROGRESS NOTES
Spiritual Health History and Assessment/Progress Note  [facility]    Initial Encounter, Attempted Encounter,  ,  ,      Name: Kimbre Morris MRN: [id number]    Age: 78 y.o.     Sex: female   Language: English   Pentecostalism: Religious   Acute respiratory failure with hypoxia     Date: 11/18/2024                           Spiritual Assessment began in [facility]        Referral/Consult From: Rounding   Encounter Overview/Reason: Initial Encounter, Attempted Encounter  Service Provided For: Patient    Marlen, Belief, Meaning:   Patient unable to assess at this time  Family/Friends: Unable to access at this time      Importance and Influence:  Patient unable to assess at this time  Family/Friends: Unable to assess at this time      Community:  Patient : Unable to assess at this tie  Family/Friends : Unable to assess at this time    Assessment and Plan of Care:     Patient Interventions include: Prayer and the ministry of presence  Family/Friends Interventions include: No family/friends present    Patient Plan of Care: Spiritual Care available upon further referral  Family/Friends Plan of Care: Spiritual Care available upon further referral    Electronically signed by SHAILESH Salazar on 11/18/2024 at 2:02 PM     The  visited with the patient in Room 270 during rounding. At the time of the visit, the patient was not able to speak because of focusing on her breathing. The  provided prayer, the ministry of presence and left a pastoral care note on the patient's bedtable as a reminder that chaplains are available as needed.    mamta Soto M.Div.

## 2024-11-18 NOTE — PROGRESS NOTES
Upon returning to the patient's Room 270 to leave a pastoral care note, the patient had awoken and welcomed the visit.     The patient articulated  that she feels better, has good support from her  and daughter, who visited earlier in the day, is hopeful to be discharged tomorrow and was open to the 's reminder to wear a mask as a precaution when handling her grandchildren that are battling the flu.  She regrets that she has not been following something so simple and was very open to change, especially as it relates to being around familyl during the holidays.    The patient was reminded that chaplains are available as needed.    Chaplain Pari Soto M.Div.

## 2024-11-18 NOTE — CARE COORDINATION
CM reviewed Pt medicals, Pt lives with her daughter and was IND with ADL.    Stefano need PT/OT eval/recommendations.     Per IDR  Came in SOB, BiPAP, in case of Pneumonia Pt is on IV ABX.     No oxygen now.     A&O x 4

## 2024-11-19 ENCOUNTER — APPOINTMENT (OUTPATIENT)
Facility: HOSPITAL | Age: 79
End: 2024-11-19
Payer: MEDICARE

## 2024-11-19 LAB
ALBUMIN SERPL-MCNC: 2.6 G/DL (ref 3.5–5)
ALBUMIN SERPL-MCNC: 2.8 G/DL (ref 3.5–5)
ALBUMIN/GLOB SERPL: 0.6 (ref 1.1–2.2)
ALBUMIN/GLOB SERPL: 0.7 (ref 1.1–2.2)
ALP SERPL-CCNC: 82 U/L (ref 45–117)
ALP SERPL-CCNC: 96 U/L (ref 45–117)
ALT SERPL-CCNC: 1509 U/L (ref 12–78)
ALT SERPL-CCNC: 916 U/L (ref 12–78)
ANION GAP SERPL CALC-SCNC: 6 MMOL/L (ref 2–12)
ANION GAP SERPL CALC-SCNC: 6 MMOL/L (ref 2–12)
ANION GAP SERPL CALC-SCNC: 7 MMOL/L (ref 2–12)
AST SERPL W P-5'-P-CCNC: 745 U/L (ref 15–37)
AST SERPL W P-5'-P-CCNC: 989 U/L (ref 15–37)
BASOPHILS # BLD: 0 K/UL (ref 0–0.1)
BASOPHILS NFR BLD: 0 % (ref 0–1)
BILIRUB SERPL-MCNC: 1.1 MG/DL (ref 0.2–1)
BILIRUB SERPL-MCNC: 1.2 MG/DL (ref 0.2–1)
BUN SERPL-MCNC: 36 MG/DL (ref 6–20)
BUN SERPL-MCNC: 40 MG/DL (ref 6–20)
BUN SERPL-MCNC: 41 MG/DL (ref 6–20)
BUN/CREAT SERPL: 16 (ref 12–20)
BUN/CREAT SERPL: 17 (ref 12–20)
BUN/CREAT SERPL: 17 (ref 12–20)
CA-I BLD-MCNC: 8.7 MG/DL (ref 8.5–10.1)
CA-I BLD-MCNC: 8.9 MG/DL (ref 8.5–10.1)
CA-I BLD-MCNC: 9.7 MG/DL (ref 8.5–10.1)
CHLORIDE SERPL-SCNC: 100 MMOL/L (ref 97–108)
CHLORIDE SERPL-SCNC: 107 MMOL/L (ref 97–108)
CHLORIDE SERPL-SCNC: 99 MMOL/L (ref 97–108)
CO2 SERPL-SCNC: 34 MMOL/L (ref 21–32)
CO2 SERPL-SCNC: 35 MMOL/L (ref 21–32)
CO2 SERPL-SCNC: 36 MMOL/L (ref 21–32)
CREAT SERPL-MCNC: 2.19 MG/DL (ref 0.55–1.02)
CREAT SERPL-MCNC: 2.36 MG/DL (ref 0.55–1.02)
CREAT SERPL-MCNC: 2.43 MG/DL (ref 0.55–1.02)
DATE LAST DOSE: NORMAL
DIFFERENTIAL METHOD BLD: ABNORMAL
DOSE AMOUNT: NORMAL UNITS
EKG ATRIAL RATE: 69 BPM
EKG ATRIAL RATE: 75 BPM
EKG DIAGNOSIS: NORMAL
EKG DIAGNOSIS: NORMAL
EKG P AXIS: 78 DEGREES
EKG P AXIS: 86 DEGREES
EKG P-R INTERVAL: 162 MS
EKG P-R INTERVAL: 184 MS
EKG Q-T INTERVAL: 462 MS
EKG Q-T INTERVAL: 472 MS
EKG QRS DURATION: 76 MS
EKG QRS DURATION: 80 MS
EKG QTC CALCULATION (BAZETT): 527 MS
EKG QTC CALCULATION (BAZETT): 574 MS
EKG R AXIS: 7 DEGREES
EKG R AXIS: 9 DEGREES
EKG T AXIS: 25 DEGREES
EKG T AXIS: 41 DEGREES
EKG VENTRICULAR RATE: 75 BPM
EKG VENTRICULAR RATE: 93 BPM
EOSINOPHIL # BLD: 0.1 K/UL (ref 0–0.4)
EOSINOPHIL NFR BLD: 0 % (ref 0–7)
ERYTHROCYTE [DISTWIDTH] IN BLOOD BY AUTOMATED COUNT: 14.2 % (ref 11.5–14.5)
GLOBULIN SER CALC-MCNC: 3.8 G/DL (ref 2–4)
GLOBULIN SER CALC-MCNC: 4.6 G/DL (ref 2–4)
GLUCOSE BLD STRIP.AUTO-MCNC: 103 MG/DL (ref 65–100)
GLUCOSE BLD STRIP.AUTO-MCNC: 115 MG/DL (ref 65–100)
GLUCOSE BLD STRIP.AUTO-MCNC: 117 MG/DL (ref 65–100)
GLUCOSE SERPL-MCNC: 105 MG/DL (ref 65–100)
GLUCOSE SERPL-MCNC: 117 MG/DL (ref 65–100)
GLUCOSE SERPL-MCNC: 134 MG/DL (ref 65–100)
HCT VFR BLD AUTO: 35 % (ref 35–47)
HGB BLD-MCNC: 11.3 G/DL (ref 11.5–16)
IMM GRANULOCYTES # BLD AUTO: 0.1 K/UL (ref 0–0.04)
IMM GRANULOCYTES NFR BLD AUTO: 1 % (ref 0–0.5)
LYMPHOCYTES # BLD: 1.5 K/UL (ref 0.8–3.5)
LYMPHOCYTES NFR BLD: 8 % (ref 12–49)
MAGNESIUM SERPL-MCNC: 1.8 MG/DL (ref 1.6–2.4)
MAGNESIUM SERPL-MCNC: 2.2 MG/DL (ref 1.6–2.4)
MAGNESIUM SERPL-MCNC: 2.4 MG/DL (ref 1.6–2.4)
MCH RBC QN AUTO: 30.7 PG (ref 26–34)
MCHC RBC AUTO-ENTMCNC: 32.3 G/DL (ref 30–36.5)
MCV RBC AUTO: 95.1 FL (ref 80–99)
MONOCYTES # BLD: 0.5 K/UL (ref 0–1)
MONOCYTES NFR BLD: 3 % (ref 5–13)
NEUTS SEG # BLD: 15.4 K/UL (ref 1.8–8)
NEUTS SEG NFR BLD: 88 % (ref 32–75)
NRBC # BLD: 0 K/UL (ref 0–0.01)
NRBC BLD-RTO: 0 PER 100 WBC
PERFORMED BY:: ABNORMAL
PHOSPHATE SERPL-MCNC: 2.7 MG/DL (ref 2.6–4.7)
PHOSPHATE SERPL-MCNC: 2.7 MG/DL (ref 2.6–4.7)
PLATELET # BLD AUTO: 195 K/UL (ref 150–400)
PMV BLD AUTO: 10.6 FL (ref 8.9–12.9)
POTASSIUM SERPL-SCNC: 3.2 MMOL/L (ref 3.5–5.1)
POTASSIUM SERPL-SCNC: 3.4 MMOL/L (ref 3.5–5.1)
POTASSIUM SERPL-SCNC: 3.6 MMOL/L (ref 3.5–5.1)
PROCALCITONIN SERPL-MCNC: 41.54 NG/ML
PROT SERPL-MCNC: 6.4 G/DL (ref 6.4–8.2)
PROT SERPL-MCNC: 7.4 G/DL (ref 6.4–8.2)
RBC # BLD AUTO: 3.68 M/UL (ref 3.8–5.2)
SODIUM SERPL-SCNC: 141 MMOL/L (ref 136–145)
SODIUM SERPL-SCNC: 142 MMOL/L (ref 136–145)
SODIUM SERPL-SCNC: 147 MMOL/L (ref 136–145)
VANCOMYCIN SERPL-MCNC: 17.8 UG/ML
WBC # BLD AUTO: 17.6 K/UL (ref 3.6–11)

## 2024-11-19 PROCEDURE — 36415 COLL VENOUS BLD VENIPUNCTURE: CPT

## 2024-11-19 PROCEDURE — 85025 COMPLETE CBC W/AUTO DIFF WBC: CPT

## 2024-11-19 PROCEDURE — 70450 CT HEAD/BRAIN W/O DYE: CPT

## 2024-11-19 PROCEDURE — 84145 PROCALCITONIN (PCT): CPT

## 2024-11-19 PROCEDURE — 80053 COMPREHEN METABOLIC PANEL: CPT

## 2024-11-19 PROCEDURE — 6360000002 HC RX W HCPCS: Performed by: NURSE PRACTITIONER

## 2024-11-19 PROCEDURE — 84100 ASSAY OF PHOSPHORUS: CPT

## 2024-11-19 PROCEDURE — 2000000000 HC ICU R&B

## 2024-11-19 PROCEDURE — 80202 ASSAY OF VANCOMYCIN: CPT

## 2024-11-19 PROCEDURE — 2580000003 HC RX 258: Performed by: NURSE PRACTITIONER

## 2024-11-19 PROCEDURE — 82962 GLUCOSE BLOOD TEST: CPT

## 2024-11-19 PROCEDURE — 6370000000 HC RX 637 (ALT 250 FOR IP): Performed by: STUDENT IN AN ORGANIZED HEALTH CARE EDUCATION/TRAINING PROGRAM

## 2024-11-19 PROCEDURE — 83735 ASSAY OF MAGNESIUM: CPT

## 2024-11-19 PROCEDURE — 93005 ELECTROCARDIOGRAM TRACING: CPT | Performed by: STUDENT IN AN ORGANIZED HEALTH CARE EDUCATION/TRAINING PROGRAM

## 2024-11-19 PROCEDURE — 80048 BASIC METABOLIC PNL TOTAL CA: CPT

## 2024-11-19 PROCEDURE — 2500000003 HC RX 250 WO HCPCS: Performed by: STUDENT IN AN ORGANIZED HEALTH CARE EDUCATION/TRAINING PROGRAM

## 2024-11-19 PROCEDURE — 71045 X-RAY EXAM CHEST 1 VIEW: CPT

## 2024-11-19 RX ORDER — MAGNESIUM SULFATE HEPTAHYDRATE 40 MG/ML
2000 INJECTION, SOLUTION INTRAVENOUS ONCE
Status: COMPLETED | OUTPATIENT
Start: 2024-11-19 | End: 2024-11-19

## 2024-11-19 RX ORDER — POTASSIUM CHLORIDE 1500 MG/1
20 TABLET, EXTENDED RELEASE ORAL ONCE
Status: COMPLETED | OUTPATIENT
Start: 2024-11-19 | End: 2024-11-19

## 2024-11-19 RX ORDER — BISACODYL 10 MG
10 SUPPOSITORY, RECTAL RECTAL DAILY PRN
Status: DISCONTINUED | OUTPATIENT
Start: 2024-11-19 | End: 2024-11-23 | Stop reason: HOSPADM

## 2024-11-19 RX ORDER — POLYETHYLENE GLYCOL 3350 17 G/17G
17 POWDER, FOR SOLUTION ORAL DAILY
Status: DISCONTINUED | OUTPATIENT
Start: 2024-11-19 | End: 2024-11-23 | Stop reason: HOSPADM

## 2024-11-19 RX ORDER — FUROSEMIDE 40 MG/1
20 TABLET ORAL DAILY
Status: DISCONTINUED | OUTPATIENT
Start: 2024-11-19 | End: 2024-11-23 | Stop reason: HOSPADM

## 2024-11-19 RX ADMIN — HEPARIN SODIUM 5000 UNITS: 5000 INJECTION INTRAVENOUS; SUBCUTANEOUS at 05:41

## 2024-11-19 RX ADMIN — PIPERACILLIN AND TAZOBACTAM 3375 MG: 3; .375 INJECTION, POWDER, LYOPHILIZED, FOR SOLUTION INTRAVENOUS at 14:12

## 2024-11-19 RX ADMIN — POTASSIUM CHLORIDE 20 MEQ: 1500 TABLET, EXTENDED RELEASE ORAL at 10:06

## 2024-11-19 RX ADMIN — METOPROLOL SUCCINATE 25 MG: 25 TABLET, EXTENDED RELEASE ORAL at 08:29

## 2024-11-19 RX ADMIN — POLYETHYLENE GLYCOL 3350 17 G: 17 POWDER, FOR SOLUTION ORAL at 14:08

## 2024-11-19 RX ADMIN — SODIUM CHLORIDE, PRESERVATIVE FREE 10 ML: 5 INJECTION INTRAVENOUS at 08:29

## 2024-11-19 RX ADMIN — SODIUM CHLORIDE, PRESERVATIVE FREE 10 ML: 5 INJECTION INTRAVENOUS at 22:50

## 2024-11-19 RX ADMIN — PIPERACILLIN AND TAZOBACTAM 3375 MG: 3; .375 INJECTION, POWDER, LYOPHILIZED, FOR SOLUTION INTRAVENOUS at 05:43

## 2024-11-19 RX ADMIN — FUROSEMIDE 40 MG: 10 INJECTION, SOLUTION INTRAMUSCULAR; INTRAVENOUS at 08:28

## 2024-11-19 RX ADMIN — APIXABAN 2.5 MG: 5 TABLET, FILM COATED ORAL at 22:48

## 2024-11-19 RX ADMIN — MAGNESIUM SULFATE HEPTAHYDRATE 2000 MG: 40 INJECTION, SOLUTION INTRAVENOUS at 10:14

## 2024-11-19 RX ADMIN — APIXABAN 2.5 MG: 5 TABLET, FILM COATED ORAL at 08:28

## 2024-11-19 RX ADMIN — LEVOTHYROXINE SODIUM 75 MCG: 0.07 TABLET ORAL at 06:35

## 2024-11-19 RX ADMIN — PIPERACILLIN AND TAZOBACTAM 3375 MG: 3; .375 INJECTION, POWDER, LYOPHILIZED, FOR SOLUTION INTRAVENOUS at 22:51

## 2024-11-19 NOTE — CARE COORDINATION
CM reviewed Pt medicals, Pt lives with her daughter and was IND with ADL.    Will need PT/OT eval/recommendations.     Per IDR  Not appropriate for PT at this time.    Getting head CT,  checking for bleeding.     Repeat EKG this afternoon.

## 2024-11-19 NOTE — PROGRESS NOTES
[facility] Progress Note        Kimber Morris  1945  [id number]  11/19/2024      Brief HPI / Hospital Course:  Ms. Morris is a 78-year-old female with PMH HFrEF, DM2, hypothyroidism, who presented for shortness of breath and found to be hypoxic.  Initial workup concerning for pneumonia versus pulm edema.  Respiratory failure improved with BiPAP and patient subsequently admitted to ICU for further evaluation management.  Patient was continued on BiPAP and started on diuresis.  Of note patient was also in A-fib RVR requiring amiodarone bolus.  Amiodarone socially discontinued upon quick resolution of RVR.  Antibiotics otherwise initiated for possible pneumonia.    11/17: Symptomatic improvement of respiratory failure.  Responding well to diuresis.  Able to de-escalate off BiPAP onto nasal cannula  11/18: Tolerated nasal cannula throughout the night.  Will continue with diuretics and antibiotics.  With his continued improvement anticipate transfer out of ICU within the next 1 to 2 days.  11/19: restarting home eliquis; clarified with family that pt no longer on ASA and plavix. Otherwise signficant t-wave inversions on EKG prompted rule out of cerebral t-waves with head CT (negative). No acute changes in pt, appears significantly improved.       Assessment and plan:  Neuro  No acute issues    CV  A-fib RVR, improved  Acute on chronic HFrEF (NYHA III, AHA C)  Abnormal T-wave inversions  - Tolerating diuresis with Lasix 40 mg IV twice daily; able to wean oxygen support appropriately  - Rate controlled s/p amiodarone bolus.  Loading infusion dc'd   - Restarted home metoprolol  - Given borderline blood pressures will avoid home hydralazine and spironolactone.  Appears patient required midodrine to tolerate these medications. Do not anticipate restarting home antihypertensives if intolerant of regimen  - Home Jardiance also being held; can likely be restarted upon discharge and/or transfer out of  She is normal weight. She is not ill-appearing, toxic-appearing or diaphoretic.   HENT:      Head: Normocephalic and atraumatic.      Nose: Nose normal.      Mouth/Throat:      Mouth: Mucous membranes are moist.   Eyes:      Extraocular Movements: Extraocular movements intact.      Conjunctiva/sclera: Conjunctivae normal.   Cardiovascular:      Rate and Rhythm: Normal rate. Rhythm irregular.      Pulses: Normal pulses.      Heart sounds: Normal heart sounds. No murmur heard.  Pulmonary:      Effort: No respiratory distress.      Breath sounds: No wheezing.   Abdominal:      General: Bowel sounds are normal.      Palpations: Abdomen is soft.      Tenderness: There is no abdominal tenderness. There is no guarding or rebound.   Musculoskeletal:         General: No swelling. Normal range of motion.      Cervical back: Normal range of motion. No rigidity.   Skin:     General: Skin is warm and dry.      Capillary Refill: Capillary refill takes less than 2 seconds.   Neurological:      General: No focal deficit present.      Mental Status: She is alert and oriented to person, place, and time. Mental status is at baseline.   Psychiatric:         Mood and Affect: Mood normal.         LABS & Imaging:   Today's labs and imaging review.          CCM time:   45 mins.  This patient is critically ill with risk of clinical deterioration.  The documented time was time spent providing direct patient care, formulating care plan and discussing this plan with other providers, updating family and discussing goals of care with patient and/or family. It does not include time spent performing any procedures that are billed separtately.    DO Azalea [facility]  11/19/24

## 2024-11-19 NOTE — PROGRESS NOTES
Vancomycin Dosing Consult  Kimber Morris is a 78 y.o. female with Pneumonia (CAP). Pharmacy was consulted by Josi Holloway NP to dose and monitor vancomycin. Today is day 4.    Antibiotic regimen: Vancomycin + Zosyn    Temp (24hrs), Av.9 °F (37.2 °C), Min:98.4 °F (36.9 °C), Max:99.2 °F (37.3 °C)    Recent Labs     24  0430 24  1200 24  0330 24  0345 24  1533   WBC 20.1*  --  21.5* 17.6*  --    CREATININE 1.67*   < > 2.33* 2.43* 2.36*   BUN 22*   < > 35* 41* 40*    < > = values in this interval not displayed.     Est CrCl: 17 mL/min  Concomitant nephrotoxic drugs: Loop diuretics and Contrast agents    Cultures:    Blood 1: NGTD, prelim   Blood 2: NGTD, prelim    MRSA Swab: Not detected     Target range: Re-dose for random level <15 mcg/mL    Recent level history:  Date/Time Dose & Interval Measured Level (mcg/mL) Associated AUC/LIBRA    @2040 1500mg x 1 0024 15.6     @1830 500 mg x 1 15.2     @  500 mg x 1   17.8         Assessment/Plan:   Worsening ERUM, continue pulse dosing of vancomycin for now  Level not ready for re-dosing  Next level scheduled for  @0600 w/ AM labs  CMP through    Antimicrobial stop date 5 days

## 2024-11-19 NOTE — PROGRESS NOTES
Vancomycin Dosing Consult  Kimber Morris is a 78 y.o. female with Pneumonia (CAP). Pharmacy was consulted by Josi Holloway NP to dose and monitor vancomycin. Today is day 3.    Antibiotic regimen: Vancomycin + Zosyn    Temp (24hrs), Av.4 °F (37.4 °C), Min:99.1 °F (37.3 °C), Max:99.7 °F (37.6 °C)    Recent Labs     24  2235 24  0430 24  1200 24  1745 24  0330   WBC 13.2* 20.1*  --   --  21.5*   CREATININE 1.67* 1.67* 2.25* 2.22* 2.33*   BUN 20 22* 29* 31* 35*     Est CrCl: 17 mL/min  Concomitant nephrotoxic drugs: Loop diuretics and Contrast agents    Cultures:    Blood 1: NGTD, prelim   Blood 2: NGTD, prelim    MRSA Swab: Not ordered, patient already received first dose of vancomycin    Target range: Re-dose for random level <15 mcg/mL    Recent level history:  Date/Time Dose & Interval Measured Level (mcg/mL) Associated AUC/LIBRA    @2040 1500mg x 1 0024 15.6     @1830 500 mg x 1 15.2         Assessment/Plan:   Worsening ERUM, continue pulse dosing of vancomycin for now  Given close to re-dosing level, and worsening renal function will order Vancomycin IV 500mg x 1 now  Next level scheduled for  @1600  CMP through    Antimicrobial stop date 5 days

## 2024-11-20 LAB
ALBUMIN SERPL-MCNC: 2.8 G/DL (ref 3.5–5)
ALBUMIN/GLOB SERPL: 0.6 (ref 1.1–2.2)
ALP SERPL-CCNC: 96 U/L (ref 45–117)
ALT SERPL-CCNC: 1362 U/L (ref 12–78)
ANION GAP SERPL CALC-SCNC: 5 MMOL/L (ref 2–12)
AST SERPL W P-5'-P-CCNC: 626 U/L (ref 15–37)
BACTERIA SPEC CULT: NORMAL
BASOPHILS # BLD: 0 K/UL (ref 0–0.1)
BASOPHILS NFR BLD: 0 % (ref 0–1)
BILIRUB SERPL-MCNC: 1.3 MG/DL (ref 0.2–1)
BUN SERPL-MCNC: 38 MG/DL (ref 6–20)
BUN/CREAT SERPL: 18 (ref 12–20)
CA-I BLD-MCNC: 9.5 MG/DL (ref 8.5–10.1)
CHLORIDE SERPL-SCNC: 100 MMOL/L (ref 97–108)
CO2 SERPL-SCNC: 36 MMOL/L (ref 21–32)
CREAT SERPL-MCNC: 2.11 MG/DL (ref 0.55–1.02)
DATE LAST DOSE: NORMAL
DIFFERENTIAL METHOD BLD: ABNORMAL
DOSE AMOUNT: NORMAL UNITS
EOSINOPHIL # BLD: 0.2 K/UL (ref 0–0.4)
EOSINOPHIL NFR BLD: 1 % (ref 0–7)
ERYTHROCYTE [DISTWIDTH] IN BLOOD BY AUTOMATED COUNT: 13.9 % (ref 11.5–14.5)
GLOBULIN SER CALC-MCNC: 4.4 G/DL (ref 2–4)
GLUCOSE BLD STRIP.AUTO-MCNC: 106 MG/DL (ref 65–100)
GLUCOSE BLD STRIP.AUTO-MCNC: 107 MG/DL (ref 65–100)
GLUCOSE BLD STRIP.AUTO-MCNC: 78 MG/DL (ref 65–100)
GLUCOSE BLD STRIP.AUTO-MCNC: 88 MG/DL (ref 65–100)
GLUCOSE SERPL-MCNC: 103 MG/DL (ref 65–100)
HCT VFR BLD AUTO: 38.4 % (ref 35–47)
HGB BLD-MCNC: 12.3 G/DL (ref 11.5–16)
IMM GRANULOCYTES # BLD AUTO: 0.1 K/UL (ref 0–0.04)
IMM GRANULOCYTES NFR BLD AUTO: 1 % (ref 0–0.5)
LYMPHOCYTES # BLD: 1.6 K/UL (ref 0.8–3.5)
LYMPHOCYTES NFR BLD: 14 % (ref 12–49)
Lab: NORMAL
MAGNESIUM SERPL-MCNC: 2.1 MG/DL (ref 1.6–2.4)
MCH RBC QN AUTO: 30.6 PG (ref 26–34)
MCHC RBC AUTO-ENTMCNC: 32 G/DL (ref 30–36.5)
MCV RBC AUTO: 95.5 FL (ref 80–99)
MONOCYTES # BLD: 0.6 K/UL (ref 0–1)
MONOCYTES NFR BLD: 5 % (ref 5–13)
NEUTS SEG # BLD: 9.5 K/UL (ref 1.8–8)
NEUTS SEG NFR BLD: 79 % (ref 32–75)
NRBC # BLD: 0.02 K/UL (ref 0–0.01)
NRBC BLD-RTO: 0.2 PER 100 WBC
PERFORMED BY:: ABNORMAL
PERFORMED BY:: ABNORMAL
PERFORMED BY:: NORMAL
PERFORMED BY:: NORMAL
PHOSPHATE SERPL-MCNC: 3.4 MG/DL (ref 2.6–4.7)
PLATELET # BLD AUTO: 188 K/UL (ref 150–400)
PMV BLD AUTO: 10.7 FL (ref 8.9–12.9)
POTASSIUM SERPL-SCNC: 3.7 MMOL/L (ref 3.5–5.1)
PROCALCITONIN SERPL-MCNC: 24.4 NG/ML
PROT SERPL-MCNC: 7.2 G/DL (ref 6.4–8.2)
RBC # BLD AUTO: 4.02 M/UL (ref 3.8–5.2)
SODIUM SERPL-SCNC: 141 MMOL/L (ref 136–145)
VANCOMYCIN SERPL-MCNC: 13.3 UG/ML
WBC # BLD AUTO: 11.9 K/UL (ref 3.6–11)

## 2024-11-20 PROCEDURE — 6360000002 HC RX W HCPCS: Performed by: NURSE PRACTITIONER

## 2024-11-20 PROCEDURE — 83735 ASSAY OF MAGNESIUM: CPT

## 2024-11-20 PROCEDURE — 36415 COLL VENOUS BLD VENIPUNCTURE: CPT

## 2024-11-20 PROCEDURE — 84145 PROCALCITONIN (PCT): CPT

## 2024-11-20 PROCEDURE — 2580000003 HC RX 258: Performed by: NURSE PRACTITIONER

## 2024-11-20 PROCEDURE — 80053 COMPREHEN METABOLIC PANEL: CPT

## 2024-11-20 PROCEDURE — 6370000000 HC RX 637 (ALT 250 FOR IP): Performed by: STUDENT IN AN ORGANIZED HEALTH CARE EDUCATION/TRAINING PROGRAM

## 2024-11-20 PROCEDURE — 97161 PT EVAL LOW COMPLEX 20 MIN: CPT

## 2024-11-20 PROCEDURE — 82962 GLUCOSE BLOOD TEST: CPT

## 2024-11-20 PROCEDURE — 80202 ASSAY OF VANCOMYCIN: CPT

## 2024-11-20 PROCEDURE — 1100000000 HC RM PRIVATE

## 2024-11-20 PROCEDURE — 84100 ASSAY OF PHOSPHORUS: CPT

## 2024-11-20 PROCEDURE — 85025 COMPLETE CBC W/AUTO DIFF WBC: CPT

## 2024-11-20 PROCEDURE — 97530 THERAPEUTIC ACTIVITIES: CPT

## 2024-11-20 RX ADMIN — SODIUM CHLORIDE, PRESERVATIVE FREE 10 ML: 5 INJECTION INTRAVENOUS at 22:14

## 2024-11-20 RX ADMIN — FUROSEMIDE 20 MG: 40 TABLET ORAL at 09:35

## 2024-11-20 RX ADMIN — SODIUM CHLORIDE, PRESERVATIVE FREE 10 ML: 5 INJECTION INTRAVENOUS at 08:41

## 2024-11-20 RX ADMIN — POTASSIUM CHLORIDE 10 MEQ: 7.46 INJECTION, SOLUTION INTRAVENOUS at 08:40

## 2024-11-20 RX ADMIN — POLYETHYLENE GLYCOL 3350 17 G: 17 POWDER, FOR SOLUTION ORAL at 09:00

## 2024-11-20 RX ADMIN — APIXABAN 2.5 MG: 5 TABLET, FILM COATED ORAL at 22:14

## 2024-11-20 RX ADMIN — APIXABAN 2.5 MG: 5 TABLET, FILM COATED ORAL at 09:35

## 2024-11-20 RX ADMIN — POTASSIUM CHLORIDE 10 MEQ: 7.46 INJECTION, SOLUTION INTRAVENOUS at 02:54

## 2024-11-20 RX ADMIN — METOPROLOL SUCCINATE 25 MG: 25 TABLET, EXTENDED RELEASE ORAL at 09:35

## 2024-11-20 RX ADMIN — LEVOTHYROXINE SODIUM 75 MCG: 0.07 TABLET ORAL at 05:14

## 2024-11-20 RX ADMIN — POTASSIUM CHLORIDE 10 MEQ: 7.46 INJECTION, SOLUTION INTRAVENOUS at 06:08

## 2024-11-20 RX ADMIN — PIPERACILLIN AND TAZOBACTAM 3375 MG: 3; .375 INJECTION, POWDER, LYOPHILIZED, FOR SOLUTION INTRAVENOUS at 22:17

## 2024-11-20 RX ADMIN — PIPERACILLIN AND TAZOBACTAM 3375 MG: 3; .375 INJECTION, POWDER, LYOPHILIZED, FOR SOLUTION INTRAVENOUS at 04:48

## 2024-11-20 RX ADMIN — POTASSIUM CHLORIDE 10 MEQ: 7.46 INJECTION, SOLUTION INTRAVENOUS at 11:14

## 2024-11-20 RX ADMIN — PIPERACILLIN AND TAZOBACTAM 3375 MG: 3; .375 INJECTION, POWDER, LYOPHILIZED, FOR SOLUTION INTRAVENOUS at 14:18

## 2024-11-20 ASSESSMENT — PAIN SCALES - GENERAL: PAINLEVEL_OUTOF10: 0

## 2024-11-20 NOTE — PROGRESS NOTES
4 Eyes Skin Assessment     NAME:  Kimber Morris  YOB: 1945  MEDICAL RECORD NUMBER:  [id number]    The patient is being assessed for  Transfer to New Unit    I agree that at least one RN has performed a thorough Head to Toe Skin Assessment on the patient. ALL assessment sites listed below have been assessed.      Areas assessed by both nurses:    Head, Face, Ears, Shoulders, Back, Chest, Arms, Elbows, Hands, Sacrum. Buttock, Coccyx, Ischium, and Legs. Feet and Heels        Does the Patient have a Wound? Old scarring to BLE; old surgical scar present on abdomen       Nathaniel Prevention initiated by RN: Yes  Wound Care Orders initiated by RN: No    Pressure Injury (Stage 3,4, Unstageable, DTI, NWPT, and Complex wounds) if present, place Wound referral order by RN under : No    New Ostomies, if present place, Ostomy referral order under : No     Nurse 1 eSignature: Electronically signed by Naga Garcia RN on 11/20/24 at 6:01 PM EST    **SHARE this note so that the co-signing nurse can place an eSignature**    Nurse 2 eSignature: {Esignature:[id number]}

## 2024-11-20 NOTE — PROGRESS NOTES
Received Order for Telemetry     Kimber Morris   1945   [id number]   Respiratory distress [R06.03]  Hypoxia [R09.02]  Atrial fibrillation with rapid ventricular response (HCC) [I48.91]  Acute respiratory failure with hypoxia [J96.01]  Acute on chronic congestive heart failure, unspecified heart failure type (HCC) [I50.9]   Bulmaro Joseph MD     Tele Box # [id number] placed on patient at  1823 pm  ER Room # icu  did not call to confirm tele order.  Admitting to Room 428  Verified with Primary Nurse marimar rodriguez at  1823 pm

## 2024-11-20 NOTE — CARE COORDINATION
CM reviewed Pt medicals, Pt lives with her daughter and was IND with ADL    Will need PT/OT eval/recommendations.     Per IDR  PT/OT if they can work with Pt today that would be great.    Pt will transfer to the floor.

## 2024-11-20 NOTE — TRANSITION OF CARE
Hospitalist Progress Note    NAME:   Kimber Morris   : 1945   MRN: [id number]     Date/Time: 2024 4:16 PM  Patient PCP: Jennifer Horn FNP    Estimated discharge date:  Barriers:       Assessment / Plan:    Acute respiratory failure  Off of BiPAP and tolerating room air.   Currently does not seem to be on supplemental oxygen.  Secondary to bilateral pneumonia/ARDS, A-fib RVR and acute congestive heart failure.    Sepsis  Significantly elevated Pro-Noble, lactic acidosis along with leukocytosis initially secondary to likely bilateral pneumonia and ARDS.  Patient did not require any pressors during hospitalization.  Continuing to improve with downtrending inflammatory markers and Pro-Noble.    Acute on chronic systolic CHF  Transition from IV Lasix 40 Mg twice daily to p.o. Lasix daily.  Known EF of 25% with severe cardiomyopathy noted.  Continue strict ins and outs.  Associated with significant congestive hepatitis and elevated LFTs.  Continue metoprolol.    A-fib RVR  Dependent on amiodarone gtt. now weaned off.  Appreciate cardiology consultation and currently controlled on metoprolol.  Continue Eliquis 2.5 Mg twice daily.    Bilateral pneumonia  Initially associated with ARDS requiring BiPAP.  Significant improved and currently tolerating room air.  Received 4 days of IV Zosyn.  Will continue IV Zosyn as patient continues to have elevated Pro-Noble and lactic acid.    ERUM/CKD 3  Pending nephrology consultation.  Continue to monitor with IV diuresis however creatinine slowly improving denoting likely cardiorenal syndrome.    Acute hepatitis  Likely secondary to hepatic congestion from severe heart failure.  Initially presented with AST/ALT of greater than 2000.  Will obtain acute hepatitis panel.    Full CODE STATUS    DVT prophylaxis  Heparin SC      Medical Decision Making:   I personally reviewed labs: CBC, CMP, Pro-Noble  I personally reviewed imaging:  I personally reviewed EKG:  Toxic drug

## 2024-11-20 NOTE — PLAN OF CARE
PHYSICAL THERAPY EVALUATION  Patient: Kimber Morris (78 y.o. female)  Date: 11/20/2024  Primary Diagnosis: Respiratory distress [R06.03]  Hypoxia [R09.02]  Atrial fibrillation with rapid ventricular response (HCC) [I48.91]  Acute respiratory failure with hypoxia [J96.01]  Acute on chronic congestive heart failure, unspecified heart failure type (HCC) [I50.9]       Precautions: Fall Risk                      Recommendations for nursing mobility: Encourage HEP in prep for ADLs/mobility; see handout for details, Use of bed/chair alarm for safety, LE elevation for management of edema, and Use of BSC for toileting     In place during session: Peripheral IV, External Catheter, and EKG/telemetry     ASSESSMENT  Pt is a 78 y.o. female admitted on 11/16/2024 for sob; pt currently being treated for hypoxia, resp distress . Pt carla isupine upon PT arrival, agreeable to evaluation. Pt A&O x 4.  Patient lives with daughter and required assist at home    Based on the objective data described below, the patient currently presents with impaired ability to perform high-level IADLs, impaired strength, decreased activity tolerance, poor safety awareness, impaired cognition, impaired balance, and impaired posture. (See below for objective details and assist levels).     Overall pt tolerated session fair today with Pt. Pt required min-mod for bed mobility and transfers. Pt amb 5 feet with RW, gt belt and min assist; demonstrates shuffling gait. Pt will benefit from continued skilled PT to address above deficits and return to PLOF. Current PT DC recommendation Moderate intensity short-term skilled physical therapy up to 5x/week once medically appropriate.    Vitals wfl on monitor  GOALS:    Problem: Physical Therapy - Adult  Goal: By Discharge: Performs mobility at highest level of function for planned discharge setting.  See evaluation for individualized goals.  Description: FUNCTIONAL STATUS PRIOR TO ADMISSION: The patient  required  (HCC)     GERD (gastroesophageal reflux disease)     HTN (hypertension)     Thyroid disease      Past Surgical History:   Procedure Laterality Date    HYSTERECTOMY (CERVIX STATUS UNKNOWN)      MOHS SURGERY         Home Situation:  Social/Functional History  Lives With: Family, Daughter  Type of Home: House  Home Equipment: Walker - Rolling  ADL Assistance: Needs assistance  Homemaking Assistance: Needs assistance  Ambulation Assistance: Independent  Transfer Assistance: Independent    Cognitive/Behavioral Status:  Orientation  Orientation Level: Oriented to place;Oriented to person       Skin:     Edema:     Strength:    Strength: Generally decreased, functional    Range Of Motion:  AROM: Generally decreased, functional       Coordination:        Tone & Sensation:   Tone: Normal         Functional Mobility:  Bed Mobility:     Bed Mobility Training  Bed Mobility Training: Yes  Overall Level of Assistance: Minimum assistance;Assist X1  Rolling: Minimum assistance;Assist X1  Supine to Sit: Minimum assistance;Assist X1  Scooting: Minimum assistance;Assist X1  Transfers:     Transfer Training  Transfer Training: Yes  Interventions: Verbal cues;Safety awareness training  Balance:               Balance  Sitting: Intact  Standing: Impaired  Wheelchair Mobility:        Ambulation/Gait Training:                                Gait  Gait Training: Yes  Overall Level of Assistance: Minimum assistance;Moderate assistance;Additional time;Assist X1  Distance (ft): 5 Feet  Assistive Device: Gait belt;Walker, rolling  Interventions: Weight shifting training/pressure relief;Safety awareness training;Verbal cues  Gait Abnormalities: Shuffling gait                   Therapeutic Intervention provided:   bed mobility , EOB transfers, OOB transfers, amb with AD, LE therapeutic exercises, seated dynamic balance, and standing dynamic balance    Functional Measure:  [facility] AM-PAC™ “6 Clicks”         Basic Mobility Inpatient Short

## 2024-11-20 NOTE — PROGRESS NOTES
[facility] Progress Note        Kimber Morris  1945  [id number]  11/20/2024      Brief HPI / Hospital Course:  Ms. Morris is a 78-year-old female with PMH HFrEF, DM2, hypothyroidism, who presented for shortness of breath and found to be hypoxic.  Initial workup concerning for pneumonia versus pulm edema.  Respiratory failure improved with BiPAP and patient subsequently admitted to ICU for further evaluation management.  Patient was continued on BiPAP and started on diuresis.  Of note patient was also in A-fib RVR requiring amiodarone bolus.  Amiodarone socially discontinued upon quick resolution of RVR.  Antibiotics otherwise initiated for possible pneumonia.    11/17: Symptomatic improvement of respiratory failure.  Responding well to diuresis.  Able to de-escalate off BiPAP onto nasal cannula  11/18: Tolerated nasal cannula throughout the night.  Will continue with diuretics and antibiotics.  With his continued improvement anticipate transfer out of ICU within the next 1 to 2 days.  11/19: restarting home eliquis; clarified with family that pt no longer on ASA and plavix. Otherwise signficant t-wave inversions on EKG prompted rule out of cerebral t-waves with head CT (negative). No acute changes in pt, appears significantly improved.   11/20: continues to appear well. Renal function still not at baseline. Will deescalate to general medicine floors for optimization and completion of abx      Assessment and plan:  Neuro  No acute issues    CV  A-fib RVR, improved  Acute on chronic HFrEF (NYHA III, AHA C)  Abnormal T-wave inversions  - lasix 20 mg po qd; s/p Lasix 40 mg IV bid  - Rate controlled s/p amiodarone bolus only  - Restarted home metoprolol  - home hydralazine and spironolactone held d/t normal BP  - Appears patient required midodrine to tolerate GDMT for CHF. Unsure of utility in treating patient with both antihypertensives and pressors, so will not be restarting such a regimen.   -  pulses.      Heart sounds: Normal heart sounds. No murmur heard.  Pulmonary:      Effort: No respiratory distress.      Breath sounds: No wheezing.   Abdominal:      General: Bowel sounds are normal.      Palpations: Abdomen is soft.      Tenderness: There is no abdominal tenderness. There is no guarding or rebound.   Musculoskeletal:         General: No swelling. Normal range of motion.      Cervical back: Normal range of motion. No rigidity.   Skin:     General: Skin is warm and dry.      Capillary Refill: Capillary refill takes less than 2 seconds.   Neurological:      General: No focal deficit present.      Mental Status: She is alert and oriented to person, place, and time. Mental status is at baseline.   Psychiatric:         Mood and Affect: Mood normal.         LABS & Imaging:   Today's labs and imaging review.          CCM time:   35 mins.  This patient is critically ill with risk of clinical deterioration.  The documented time was time spent providing direct patient care, formulating care plan and discussing this plan with other providers, updating family and discussing goals of care with patient and/or family. It does not include time spent performing any procedures that are billed separtately.    DO [facility]  11/20/24

## 2024-11-21 LAB
ALBUMIN SERPL-MCNC: 2.8 G/DL (ref 3.5–5)
ALBUMIN/GLOB SERPL: 0.6 (ref 1.1–2.2)
ALP SERPL-CCNC: 87 U/L (ref 45–117)
ALT SERPL-CCNC: 1021 U/L (ref 12–78)
ANION GAP SERPL CALC-SCNC: 7 MMOL/L (ref 2–12)
AST SERPL W P-5'-P-CCNC: 329 U/L (ref 15–37)
BASOPHILS # BLD: 0 K/UL (ref 0–0.1)
BASOPHILS NFR BLD: 0 % (ref 0–1)
BILIRUB SERPL-MCNC: 1.2 MG/DL (ref 0.2–1)
BUN SERPL-MCNC: 30 MG/DL (ref 6–20)
BUN/CREAT SERPL: 17 (ref 12–20)
CA-I BLD-MCNC: 9.3 MG/DL (ref 8.5–10.1)
CHLORIDE SERPL-SCNC: 102 MMOL/L (ref 97–108)
CO2 SERPL-SCNC: 30 MMOL/L (ref 21–32)
CREAT SERPL-MCNC: 1.78 MG/DL (ref 0.55–1.02)
DIFFERENTIAL METHOD BLD: NORMAL
EOSINOPHIL # BLD: 0.2 K/UL (ref 0–0.4)
EOSINOPHIL NFR BLD: 2 % (ref 0–7)
ERYTHROCYTE [DISTWIDTH] IN BLOOD BY AUTOMATED COUNT: 13.9 % (ref 11.5–14.5)
GLOBULIN SER CALC-MCNC: 4.5 G/DL (ref 2–4)
GLUCOSE BLD STRIP.AUTO-MCNC: 113 MG/DL (ref 65–100)
GLUCOSE BLD STRIP.AUTO-MCNC: 125 MG/DL (ref 65–100)
GLUCOSE BLD STRIP.AUTO-MCNC: 142 MG/DL (ref 65–100)
GLUCOSE BLD STRIP.AUTO-MCNC: 164 MG/DL (ref 65–100)
GLUCOSE SERPL-MCNC: 112 MG/DL (ref 65–100)
HCT VFR BLD AUTO: 42 % (ref 35–47)
HGB BLD-MCNC: 13.4 G/DL (ref 11.5–16)
IMM GRANULOCYTES # BLD AUTO: 0 K/UL (ref 0–0.04)
IMM GRANULOCYTES NFR BLD AUTO: 0 % (ref 0–0.5)
LYMPHOCYTES # BLD: 1.6 K/UL (ref 0.8–3.5)
LYMPHOCYTES NFR BLD: 23 % (ref 12–49)
MAGNESIUM SERPL-MCNC: 2.2 MG/DL (ref 1.6–2.4)
MCH RBC QN AUTO: 30.2 PG (ref 26–34)
MCHC RBC AUTO-ENTMCNC: 31.9 G/DL (ref 30–36.5)
MCV RBC AUTO: 94.8 FL (ref 80–99)
MONOCYTES # BLD: 0.6 K/UL (ref 0–1)
MONOCYTES NFR BLD: 8 % (ref 5–13)
NEUTS SEG # BLD: 4.7 K/UL (ref 1.8–8)
NEUTS SEG NFR BLD: 67 % (ref 32–75)
NRBC # BLD: 0 K/UL (ref 0–0.01)
NRBC BLD-RTO: 0 PER 100 WBC
PERFORMED BY:: ABNORMAL
PHOSPHATE SERPL-MCNC: 3.2 MG/DL (ref 2.6–4.7)
PLATELET # BLD AUTO: 210 K/UL (ref 150–400)
PMV BLD AUTO: 10.6 FL (ref 8.9–12.9)
POTASSIUM SERPL-SCNC: 3.4 MMOL/L (ref 3.5–5.1)
PROT SERPL-MCNC: 7.3 G/DL (ref 6.4–8.2)
RBC # BLD AUTO: 4.43 M/UL (ref 3.8–5.2)
SODIUM SERPL-SCNC: 139 MMOL/L (ref 136–145)
WBC # BLD AUTO: 7.2 K/UL (ref 3.6–11)

## 2024-11-21 PROCEDURE — 83735 ASSAY OF MAGNESIUM: CPT

## 2024-11-21 PROCEDURE — 6360000002 HC RX W HCPCS: Performed by: INTERNAL MEDICINE

## 2024-11-21 PROCEDURE — 6360000002 HC RX W HCPCS: Performed by: STUDENT IN AN ORGANIZED HEALTH CARE EDUCATION/TRAINING PROGRAM

## 2024-11-21 PROCEDURE — 1100000000 HC RM PRIVATE

## 2024-11-21 PROCEDURE — 80053 COMPREHEN METABOLIC PANEL: CPT

## 2024-11-21 PROCEDURE — 2580000003 HC RX 258: Performed by: NURSE PRACTITIONER

## 2024-11-21 PROCEDURE — 82962 GLUCOSE BLOOD TEST: CPT

## 2024-11-21 PROCEDURE — 36415 COLL VENOUS BLD VENIPUNCTURE: CPT

## 2024-11-21 PROCEDURE — 85025 COMPLETE CBC W/AUTO DIFF WBC: CPT

## 2024-11-21 PROCEDURE — 6370000000 HC RX 637 (ALT 250 FOR IP): Performed by: INTERNAL MEDICINE

## 2024-11-21 PROCEDURE — 84100 ASSAY OF PHOSPHORUS: CPT

## 2024-11-21 PROCEDURE — 6370000000 HC RX 637 (ALT 250 FOR IP): Performed by: STUDENT IN AN ORGANIZED HEALTH CARE EDUCATION/TRAINING PROGRAM

## 2024-11-21 PROCEDURE — 97530 THERAPEUTIC ACTIVITIES: CPT

## 2024-11-21 PROCEDURE — 80074 ACUTE HEPATITIS PANEL: CPT

## 2024-11-21 PROCEDURE — 97165 OT EVAL LOW COMPLEX 30 MIN: CPT

## 2024-11-21 PROCEDURE — 2580000003 HC RX 258: Performed by: INTERNAL MEDICINE

## 2024-11-21 RX ORDER — HYDRALAZINE HYDROCHLORIDE 20 MG/ML
5 INJECTION INTRAMUSCULAR; INTRAVENOUS EVERY 6 HOURS PRN
Status: DISCONTINUED | OUTPATIENT
Start: 2024-11-21 | End: 2024-11-23 | Stop reason: HOSPADM

## 2024-11-21 RX ORDER — POTASSIUM CHLORIDE 1500 MG/1
40 TABLET, EXTENDED RELEASE ORAL ONCE
Status: COMPLETED | OUTPATIENT
Start: 2024-11-21 | End: 2024-11-21

## 2024-11-21 RX ADMIN — LEVOTHYROXINE SODIUM 75 MCG: 0.07 TABLET ORAL at 05:34

## 2024-11-21 RX ADMIN — PIPERACILLIN AND TAZOBACTAM 3375 MG: 3; .375 INJECTION, POWDER, LYOPHILIZED, FOR SOLUTION INTRAVENOUS at 16:53

## 2024-11-21 RX ADMIN — APIXABAN 2.5 MG: 5 TABLET, FILM COATED ORAL at 08:39

## 2024-11-21 RX ADMIN — METOPROLOL SUCCINATE 25 MG: 25 TABLET, EXTENDED RELEASE ORAL at 08:40

## 2024-11-21 RX ADMIN — POLYETHYLENE GLYCOL 3350 17 G: 17 POWDER, FOR SOLUTION ORAL at 08:39

## 2024-11-21 RX ADMIN — SODIUM CHLORIDE: 9 INJECTION, SOLUTION INTRAVENOUS at 16:52

## 2024-11-21 RX ADMIN — SODIUM CHLORIDE, PRESERVATIVE FREE 10 ML: 5 INJECTION INTRAVENOUS at 08:41

## 2024-11-21 RX ADMIN — HYDRALAZINE HYDROCHLORIDE 5 MG: 20 INJECTION INTRAMUSCULAR; INTRAVENOUS at 01:07

## 2024-11-21 RX ADMIN — SODIUM CHLORIDE, PRESERVATIVE FREE 10 ML: 5 INJECTION INTRAVENOUS at 20:56

## 2024-11-21 RX ADMIN — APIXABAN 2.5 MG: 5 TABLET, FILM COATED ORAL at 20:55

## 2024-11-21 RX ADMIN — FUROSEMIDE 20 MG: 40 TABLET ORAL at 08:41

## 2024-11-21 RX ADMIN — POTASSIUM CHLORIDE 40 MEQ: 1500 TABLET, EXTENDED RELEASE ORAL at 14:00

## 2024-11-21 ASSESSMENT — PAIN SCALES - GENERAL
PAINLEVEL_OUTOF10: 0
PAINLEVEL_OUTOF10: 0

## 2024-11-21 NOTE — CONSULTS
Pulmonary and Critical Care Consult    Subjective:   Consult Note: 11/21/2024 @no control      Chief Complaint:   Chief Complaint   Patient presents with    Respiratory Distress    Blood Infection        This patient has been seen and evaluated at the request of Dr. Joseph     78-year-old -American lady  I'm asked to see for acute on chronic respiratory failure and COPD exacerbation  Patient was admitted to the ICU now being transferred out  Seen by me during 5/24 hospitalization with left lower lobe atelectasis     Ex-smoker.  Half a pack a day for 60 years.  Quit smoking 2 years back.  Has a history of COPD    Patient admitted 11/18/2024 with hypoxic respiratory failure requiring BiPAP and Lasix along with amiodarone.  CT chest showed no PE but did show bilateral pneumonia and pulmonary edema and airway debris suspicious for aspiration pneumonia along with bilateral pleural effusions.  Treated with BiPAP and above regimen.  Now stable and being transferred out of the ICU.    Currently on room air    Chest x-ray 1119 reviewed personally and showed:  Cardiomegaly  Bilateral basal infiltrates suspicious for pneumonia versus pleural effusion versus atelectasis    Review of Systems:  Pertinent items are noted in HPI.    Past Medical History:   Diagnosis Date    Asthma     Chest discomfort     pt states off & on for few months with or without activity    CHF (congestive heart failure) (HCC)     DM (diabetes mellitus) (HCC)     GERD (gastroesophageal reflux disease)     HTN (hypertension)     Thyroid disease      Past Surgical History:   Procedure Laterality Date    HYSTERECTOMY (CERVIX STATUS UNKNOWN)      MOHS SURGERY        Family History   Problem Relation Age of Onset    Hypertension Mother     Diabetes Mother     Heart Disease Mother      Social History     Tobacco Use    Smoking status: Never    Smokeless tobacco: Never   Substance Use Topics    Alcohol use: No      Current Facility-Administered Medications  transferred out of ICU  Now on medical floor  Will be watched here closely    Currently on room air  Will use supplemental oxygen as needed to keep saturation above 92%    Hypoxia likely due to combination of volume overload, pneumonia, A-fib RVR, decompensated CHF  Continue antibiotics, IV Zosyn  Cultures sent  Further changes in antibiotics based on clinical response and culture results  Downtrending inflammatory markers  Will repeat chest x-ray in a.m.  Systemic steroids if needed  Continue nebulizers    Intake and output charting  Continue diuresis  Check electrolytes and replace as needed  Monitor renal function with fluid changes  Appreciate cardiology input  Appreciate nephrology input    Elevated LFTs due to hepatic congestion  Monitor with repeat labs    DVT and GI prophylaxis    Questions of patient were answered at bedside in detail  Case discussed in detail with RN, RT, and care team  Thank you for involving me in the care of this patient  I will follow with you closely during hospitalization    IMTIAZ JONES MD  [facility] (PAT)  11/21/2024  4:31 PM

## 2024-11-21 NOTE — CONSULTS
78-year-old lady  I am asked to see for respiratory failure and COPD exacerbation  Was in ICU on BiPAP  Now being transferred out  Seen by us 5/24  Full note to follow

## 2024-11-21 NOTE — PROGRESS NOTES
vomiting.    Suffering from severe weakness and awaiting PT evaluation.  Counseled on continuing IV antibiotics and further monitoring of renal and hepatic function.    Objective:     VITALS:   Last 24hrs VS reviewed since prior progress note. Most recent are:  Patient Vitals for the past 24 hrs:   BP Temp Temp src Pulse Resp SpO2   11/21/24 1115 110/74 97.3 °F (36.3 °C) Oral 93 18 98 %   11/21/24 0837 121/86 97.8 °F (36.6 °C) Oral 75 18 97 %   11/21/24 0702 -- -- -- 80 -- --   11/21/24 0439 103/78 97.9 °F (36.6 °C) Oral 69 20 97 %   11/20/24 2223 (!) 166/88 97.3 °F (36.3 °C) Oral 68 18 100 %   11/20/24 2017 (!) 171/75 97.5 °F (36.4 °C) -- 71 16 96 %   11/20/24 1843 -- -- -- 60 -- --   11/20/24 1751 (!) 151/84 97.5 °F (36.4 °C) Oral 67 18 99 %   11/20/24 1730 (!) 151/85 -- -- -- -- --         Intake/Output Summary (Last 24 hours) at 11/21/2024 1616  Last data filed at 11/21/2024 0837  Gross per 24 hour   Intake 553.7 ml   Output 500 ml   Net 53.7 ml        I had a face to face encounter and independently examined this patient on 11/21/2024, as outlined below:  PHYSICAL EXAM:  General: Alert, cooperative  EENT:  EOMI. Anicteric sclerae.  Resp:  CTA bilaterally, no wheezing or rales.  No accessory muscle use  CV:  Regular  rhythm,  No edema  GI:  Soft, Non distended, Non tender.  +Bowel sounds  Neurologic:  Alert and oriented X 3, normal speech,   Psych:   Good insight. Not anxious nor agitated  Skin:  No rashes.  No jaundice    Reviewed most current lab test results and cultures  YES  Reviewed most current radiology test results   YES  Review and summation of old records today    NO  Reviewed patient's current orders and MAR    YES  PMH/SH reviewed - no change compared to H&P    Procedures: see electronic medical records for all procedures/Xrays and details which were not copied into this note but were reviewed prior to creation of Plan.      LABS:  I reviewed today's most current labs and imaging studies.  Pertinent

## 2024-11-21 NOTE — CONSULTS
Nephrology Consultation          Patient: Kimber Morris MRN: [id number]  SSN: [id number]    YOB: 1945  Age: 78 y.o.  Sex: female      Subjective:   Reason for the consultation.  Acute kidney injury on chronic kidney stage III  History of present illness.  The patient is 78-year-old woman with underlying history of congestive heart failure with LVEF 25 to 30%, diabetes mellitus type 2, chronic kidney disease stage III, baseline creatinine around 1.4-1.6, hypertension was hospitalized for hypoxic respiratory failure due to bilateral pneumonia initially required BiPAP support IV antibiotics IV diuresis.  She also developed A-fib with RVR required transient amiodarone drip.  On admission her creatinine was elevated at 1.6 and peaked at 2.4 now down to 1.78.  She is currently on room air.  No noticed lower extremity swelling.  No voiding issues.    Past Medical History:   Diagnosis Date    Asthma     Chest discomfort     pt states off & on for few months with or without activity    CHF (congestive heart failure) (HCC)     DM (diabetes mellitus) (HCC)     GERD (gastroesophageal reflux disease)     HTN (hypertension)     Thyroid disease      Past Surgical History:   Procedure Laterality Date    HYSTERECTOMY (CERVIX STATUS UNKNOWN)      MOHS SURGERY        Family History   Problem Relation Age of Onset    Hypertension Mother     Diabetes Mother     Heart Disease Mother      Social History     Tobacco Use    Smoking status: Never    Smokeless tobacco: Never   Substance Use Topics    Alcohol use: No      Current Facility-Administered Medications   Medication Dose Route Frequency Provider Last Rate Last Admin    hydrALAZINE (APRESOLINE) injection 5 mg  5 mg IntraVENous Q6H PRN Lore Alexandre PA-C   5 mg at 11/21/24 0107    apixaban (ELIQUIS) tablet 2.5 mg  2.5 mg Oral BID Trista Osullivan DO   2.5 mg at 11/21/24 0839    polyethylene glycol (GLYCOLAX) packet 17 g  17 g Oral Daily Trista Osullivan DO

## 2024-11-21 NOTE — PLAN OF CARE
Problem: Safety - Adult  Goal: Free from fall injury  11/21/2024 1143 by Naga Garcia RN  Outcome: Progressing  11/21/2024 0100 by Torrie Orellana RN  Outcome: Progressing     Problem: Chronic Conditions and Co-morbidities  Goal: Patient's chronic conditions and co-morbidity symptoms are monitored and maintained or improved  11/21/2024 1143 by Naga Garcia RN  Outcome: Progressing  11/21/2024 0100 by Torrie Orellana RN  Outcome: Progressing     Problem: Discharge Planning  Goal: Discharge to home or other facility with appropriate resources  Outcome: Progressing     Problem: Skin/Tissue Integrity  Goal: Absence of new skin breakdown  Description: 1.  Monitor for areas of redness and/or skin breakdown  2.  Assess vascular access sites hourly  3.  Every 4-6 hours minimum:  Change oxygen saturation probe site  4.  Every 4-6 hours:  If on nasal continuous positive airway pressure, respiratory therapy assess nares and determine need for appliance change or resting period.  Outcome: Progressing     Problem: ABCDS Injury Assessment  Goal: Absence of physical injury  Outcome: Progressing     Problem: Anxiety  Goal: Will report anxiety at manageable levels  Description: INTERVENTIONS:  1. Administer medication as ordered  2. Teach and rehearse alternative coping skills  3. Provide emotional support with 1:1 interaction with staff  Outcome: Progressing     Problem: Coping  Goal: Pt/Family able to verbalize concerns and demonstrate effective coping strategies  Description: INTERVENTIONS:  1. Assist patient/family to identify coping skills, available support systems and cultural and spiritual values  2. Provide emotional support, including active listening and acknowledgement of concerns of patient and caregivers  3. Reduce environmental stimuli, as able  4. Instruct patient/family in relaxation techniques, as appropriate  5. Assess for spiritual pain/suffering and initiate Spiritual Care, Psychosocial Clinical  Specialist consults as needed  Outcome: Progressing     Problem: Neurosensory - Adult  Goal: Achieves stable or improved neurological status  Outcome: Progressing  Goal: Achieves maximal functionality and self care  Outcome: Progressing     Problem: Respiratory - Adult  Goal: Achieves optimal ventilation and oxygenation  Outcome: Progressing     Problem: Cardiovascular - Adult  Goal: Maintains optimal cardiac output and hemodynamic stability  Outcome: Progressing  Goal: Absence of cardiac dysrhythmias or at baseline  Outcome: Progressing     Problem: Musculoskeletal - Adult  Goal: Return mobility to safest level of function  Outcome: Progressing  Goal: Return ADL status to a safe level of function  Outcome: Progressing     Problem: Metabolic/Fluid and Electrolytes - Adult  Goal: Electrolytes maintained within normal limits  Outcome: Progressing  Goal: Hemodynamic stability and optimal renal function maintained  Outcome: Progressing  Goal: Glucose maintained within prescribed range  Outcome: Progressing     Problem: Pain  Goal: Verbalizes/displays adequate comfort level or baseline comfort level  Outcome: Progressing     Problem: Infection - Adult  Goal: Absence of infection at discharge  Outcome: Progressing

## 2024-11-21 NOTE — CONSULTS
Consult    NAME: Kimber Morris   :  1945   MRN:  [id number]     Date/Time:  2024 12:59 PM    Patient PCP: Jennifer Horn FNP  ________________________________________________________________________      Subjective:   CHIEF COMPLAINT: Shortness of breath.    HISTORY OF PRESENT ILLNESS:   Chart reviewed.  Discussed with the family members.  Patient presented to emergency room few days ago and was in acute hypoxic respiratory failure.  She was managed with the BiPAP and current medications.  She has a history of nonischemic cardiomyopathy.  She has a left ventricular systolic dysfunction.    Patient was also found to have atrial fibrillation with rapid ventricular response.    Patient has been moved out of the ICU and is feeling much better and says she wants to go home.           Past Medical History:   Diagnosis Date    Asthma     Chest discomfort     pt states off & on for few months with or without activity    CHF (congestive heart failure) (HCC)     DM (diabetes mellitus) (HCC)     GERD (gastroesophageal reflux disease)     HTN (hypertension)     Thyroid disease       Past Surgical History:   Procedure Laterality Date    HYSTERECTOMY (CERVIX STATUS UNKNOWN)      MOHS SURGERY       No Known Allergies   Meds:  See below  Social History     Tobacco Use    Smoking status: Never    Smokeless tobacco: Never   Substance Use Topics    Alcohol use: No   Negative for smoking, drinking or drugs.    Family History   Problem Relation Age of Onset    Hypertension Mother     Diabetes Mother     Heart Disease Mother         FAMILY HISTORY: Mother had a diabetes and had a pacemaker and  at the age of 80 and she did not know much about her father.    Personal history: Patient is  and has a 7 children and used to work at [employer].    REVIEW OF SYSTEMS:     []         Unable to obtain  ROS due to ---   [x]         Total of 12 systems reviewed as follows:    Constitutional: negative

## 2024-11-21 NOTE — PLAN OF CARE
OCCUPATIONAL THERAPY EVALUATION  Patient: Kimber Morris (78 y.o. female)  Date: 11/21/2024  Primary Diagnosis: Respiratory distress [R06.03]  Hypoxia [R09.02]  Atrial fibrillation with rapid ventricular response (HCC) [I48.91]  Acute respiratory failure with hypoxia [J96.01]  Acute on chronic congestive heart failure, unspecified heart failure type (HCC) [I50.9]       Precautions: Fall Risk                Recommendations for nursing mobility: Out of bed to chair for meals, Encourage HEP in prep for ADLs/mobility; see handout for details, Use of bed/chair alarm for safety, AD and gt belt for bed to chair , Amb to bathroom with AD and gait belt, and Assist x1    In place during session:External Catheter and EKG/telemetry   ASSESSMENT  Pt is a 78 y.o. female presenting to [facility] with c/o abdominal pain and shortness of breath, admitted on 11/17 and currently being treated for A-fib RVR, acute on chronic HFrEF, abnormal T-wave inversions, pulmonary edema, pneumonia, ERUM on CKD 3, pneumonia. Pt received semi-supine in bed upon arrival, AXO x3, and agreeable to OT evaluation.     Based on current observations, pt presents with decreased  functional mobility, independence in ADLs, high-level IADLs, ROM, strength, activity tolerance, safety awareness, cognition, coordination, balance (see below for objective details and assist levels).     Overall, pt tolerates session good with no c/o pain. Pt transferring from semi supine to sitting EOB with min A at trunk. Pt completing seated face washing with set up A. Pt transferring into standing with min A and use of RW, and ambulating household distance into restroom with min/CGA. Pt transferring onto commode with safety cues for hand placement and min A. Pt completing toileting task with min A for posterior diamante care in standing for thoroughness. Pt then completing hand hygiene with CGA standing at sink. Pt returning back to bed and transferring into semi supine position with CGA. Pt

## 2024-11-21 NOTE — PROGRESS NOTES
I have received the nephrology consultation  ERUM on chronic kidney disease stage III  Hypokalemia  CHF

## 2024-11-21 NOTE — CARE COORDINATION
CM met with patient at bedside to discuss therapy recommendations for Moderate intensity short-term skilled physical therapy up to 5x/week.  Patient verbalized understanding but her preference is to go home.  CM inquired about home health.  Patient is agreeable to home health.  [place] of choice given for no preference.  CM sent referrals, pending acceptance at this time.

## 2024-11-22 ENCOUNTER — APPOINTMENT (OUTPATIENT)
Facility: HOSPITAL | Age: 79
End: 2024-11-22
Payer: MEDICARE

## 2024-11-22 LAB
-: NORMAL
ALBUMIN SERPL-MCNC: 2.6 G/DL (ref 3.5–5)
ALBUMIN/GLOB SERPL: 0.6 (ref 1.1–2.2)
ALP SERPL-CCNC: 83 U/L (ref 45–117)
ALT SERPL-CCNC: 717 U/L (ref 12–78)
ANION GAP SERPL CALC-SCNC: 4 MMOL/L (ref 2–12)
AST SERPL W P-5'-P-CCNC: 158 U/L (ref 15–37)
BACTERIA SPEC CULT: NORMAL
BACTERIA SPEC CULT: NORMAL
BASOPHILS # BLD: 0 K/UL (ref 0–0.1)
BASOPHILS NFR BLD: 0 % (ref 0–1)
BILIRUB SERPL-MCNC: 1 MG/DL (ref 0.2–1)
BUN SERPL-MCNC: 35 MG/DL (ref 6–20)
BUN/CREAT SERPL: 18 (ref 12–20)
CA-I BLD-MCNC: 9.2 MG/DL (ref 8.5–10.1)
CHLORIDE SERPL-SCNC: 103 MMOL/L (ref 97–108)
CO2 SERPL-SCNC: 32 MMOL/L (ref 21–32)
CREAT SERPL-MCNC: 1.91 MG/DL (ref 0.55–1.02)
CRP SERPL-MCNC: 1.05 MG/DL (ref 0–0.3)
DIFFERENTIAL METHOD BLD: ABNORMAL
EOSINOPHIL # BLD: 0.2 K/UL (ref 0–0.4)
EOSINOPHIL NFR BLD: 3 % (ref 0–7)
ERYTHROCYTE [DISTWIDTH] IN BLOOD BY AUTOMATED COUNT: 14.5 % (ref 11.5–14.5)
GLOBULIN SER CALC-MCNC: 4.4 G/DL (ref 2–4)
GLUCOSE BLD STRIP.AUTO-MCNC: 106 MG/DL (ref 65–100)
GLUCOSE BLD STRIP.AUTO-MCNC: 118 MG/DL (ref 65–100)
GLUCOSE BLD STRIP.AUTO-MCNC: 67 MG/DL (ref 65–100)
GLUCOSE BLD STRIP.AUTO-MCNC: 75 MG/DL (ref 65–100)
GLUCOSE BLD STRIP.AUTO-MCNC: 79 MG/DL (ref 65–100)
GLUCOSE SERPL-MCNC: 116 MG/DL (ref 65–100)
HAV IGM SER QL: NONREACTIVE
HBV CORE IGM SER QL: NONREACTIVE
HBV SURFACE AG SER QL: <0.1 INDEX
HBV SURFACE AG SER QL: NEGATIVE
HCT VFR BLD AUTO: 40.4 % (ref 35–47)
HCV AB SER IA-ACNC: 0.17 INDEX
HCV AB SERPL QL IA: NONREACTIVE
HGB BLD-MCNC: 12.9 G/DL (ref 11.5–16)
IMM GRANULOCYTES # BLD AUTO: 0.1 K/UL (ref 0–0.04)
IMM GRANULOCYTES NFR BLD AUTO: 1 % (ref 0–0.5)
LYMPHOCYTES # BLD: 1.9 K/UL (ref 0.8–3.5)
LYMPHOCYTES NFR BLD: 27 % (ref 12–49)
Lab: NORMAL
Lab: NORMAL
MAGNESIUM SERPL-MCNC: 2.3 MG/DL (ref 1.6–2.4)
MCH RBC QN AUTO: 30.5 PG (ref 26–34)
MCHC RBC AUTO-ENTMCNC: 31.9 G/DL (ref 30–36.5)
MCV RBC AUTO: 95.5 FL (ref 80–99)
MONOCYTES # BLD: 0.7 K/UL (ref 0–1)
MONOCYTES NFR BLD: 11 % (ref 5–13)
NEUTS SEG # BLD: 4 K/UL (ref 1.8–8)
NEUTS SEG NFR BLD: 58 % (ref 32–75)
NRBC # BLD: 0 K/UL (ref 0–0.01)
NRBC BLD-RTO: 0 PER 100 WBC
PERFORMED BY:: ABNORMAL
PERFORMED BY:: ABNORMAL
PERFORMED BY:: NORMAL
PHOSPHATE SERPL-MCNC: 3.1 MG/DL (ref 2.6–4.7)
PLATELET # BLD AUTO: 187 K/UL (ref 150–400)
PMV BLD AUTO: 10.2 FL (ref 8.9–12.9)
POTASSIUM SERPL-SCNC: 4.3 MMOL/L (ref 3.5–5.1)
PROCALCITONIN SERPL-MCNC: 4.59 NG/ML
PROT SERPL-MCNC: 7 G/DL (ref 6.4–8.2)
RBC # BLD AUTO: 4.23 M/UL (ref 3.8–5.2)
SODIUM SERPL-SCNC: 139 MMOL/L (ref 136–145)
WBC # BLD AUTO: 6.9 K/UL (ref 3.6–11)

## 2024-11-22 PROCEDURE — 71045 X-RAY EXAM CHEST 1 VIEW: CPT

## 2024-11-22 PROCEDURE — 1100000000 HC RM PRIVATE

## 2024-11-22 PROCEDURE — 97535 SELF CARE MNGMENT TRAINING: CPT

## 2024-11-22 PROCEDURE — 82962 GLUCOSE BLOOD TEST: CPT

## 2024-11-22 PROCEDURE — 85025 COMPLETE CBC W/AUTO DIFF WBC: CPT

## 2024-11-22 PROCEDURE — 86140 C-REACTIVE PROTEIN: CPT

## 2024-11-22 PROCEDURE — 83735 ASSAY OF MAGNESIUM: CPT

## 2024-11-22 PROCEDURE — 2580000003 HC RX 258: Performed by: NURSE PRACTITIONER

## 2024-11-22 PROCEDURE — 6360000002 HC RX W HCPCS: Performed by: INTERNAL MEDICINE

## 2024-11-22 PROCEDURE — 97530 THERAPEUTIC ACTIVITIES: CPT

## 2024-11-22 PROCEDURE — 6370000000 HC RX 637 (ALT 250 FOR IP): Performed by: STUDENT IN AN ORGANIZED HEALTH CARE EDUCATION/TRAINING PROGRAM

## 2024-11-22 PROCEDURE — 36415 COLL VENOUS BLD VENIPUNCTURE: CPT

## 2024-11-22 PROCEDURE — 2580000003 HC RX 258: Performed by: INTERNAL MEDICINE

## 2024-11-22 PROCEDURE — 84145 PROCALCITONIN (PCT): CPT

## 2024-11-22 PROCEDURE — 84100 ASSAY OF PHOSPHORUS: CPT

## 2024-11-22 PROCEDURE — 80053 COMPREHEN METABOLIC PANEL: CPT

## 2024-11-22 RX ADMIN — FUROSEMIDE 20 MG: 40 TABLET ORAL at 07:49

## 2024-11-22 RX ADMIN — APIXABAN 2.5 MG: 5 TABLET, FILM COATED ORAL at 07:50

## 2024-11-22 RX ADMIN — POLYETHYLENE GLYCOL 3350 17 G: 17 POWDER, FOR SOLUTION ORAL at 07:50

## 2024-11-22 RX ADMIN — LEVOTHYROXINE SODIUM 75 MCG: 0.07 TABLET ORAL at 06:56

## 2024-11-22 RX ADMIN — SODIUM CHLORIDE, PRESERVATIVE FREE 10 ML: 5 INJECTION INTRAVENOUS at 21:28

## 2024-11-22 RX ADMIN — PIPERACILLIN AND TAZOBACTAM 3375 MG: 3; .375 INJECTION, POWDER, LYOPHILIZED, FOR SOLUTION INTRAVENOUS at 07:56

## 2024-11-22 RX ADMIN — PIPERACILLIN AND TAZOBACTAM 3375 MG: 3; .375 INJECTION, POWDER, LYOPHILIZED, FOR SOLUTION INTRAVENOUS at 01:16

## 2024-11-22 RX ADMIN — METOPROLOL SUCCINATE 25 MG: 25 TABLET, EXTENDED RELEASE ORAL at 07:49

## 2024-11-22 RX ADMIN — SODIUM CHLORIDE, PRESERVATIVE FREE 10 ML: 5 INJECTION INTRAVENOUS at 07:58

## 2024-11-22 RX ADMIN — APIXABAN 2.5 MG: 5 TABLET, FILM COATED ORAL at 21:24

## 2024-11-22 RX ADMIN — PIPERACILLIN AND TAZOBACTAM 3375 MG: 3; .375 INJECTION, POWDER, LYOPHILIZED, FOR SOLUTION INTRAVENOUS at 16:28

## 2024-11-22 ASSESSMENT — PAIN SCALES - GENERAL
PAINLEVEL_OUTOF10: 0
PAINLEVEL_OUTOF10: 0

## 2024-11-22 NOTE — PLAN OF CARE
required moderate assistance for ADLs.    Physical Therapy Goals  Initiated 11/20/2024  Pt stated goal: Get better  Pt will be I with LE HEP in 7 days.  Pt will perform bed mobility with Minimal Assist in 7 days.  Pt will perform transfers with Minimal Assist in 7 days.   Pt will amb 40 feet with LRAD safely with Minimal Assist in 7 days.    Pt will verbalize and demonstrate compliance with fall precautions  in 7 days.   Pt will demonstrate improvement in standing/gait balance from fair to good in 7 days.    Outcome: Progressing     PLAN :  Patient continues to benefit from skilled intervention to address functional impairments. Continue treatment per established plan of care to address goals.    Recommendation for discharge: (in order for the patient to meet his/her long term goals)  Moderate intensity short-term skilled physical therapy up to 5x/week    Potential barriers for safe discharge:.    IF patient discharges home will need the following DME:continuing to assess with progress     SUBJECTIVE:   Patient stated “I want to go home.”    OBJECTIVE DATA SUMMARY:   Critical Behavior:  Orientation  Orientation Level: Oriented to place;Oriented to person;Disoriented to time;Disoriented to situation  Cognition  Overall Cognitive Status: Exceptions  Arousal/Alertness: Appears intact  Following Commands: Appears intact  Attention Span: Appears intact  Memory: Decreased recall of biographical Information  Safety Judgement: Decreased awareness of need for safety;Decreased awareness of need for assistance  Problem Solving: Assistance required to generate solutions;Assistance required to identify errors made  Insights: Decreased awareness of deficits  Initiation: Requires cues for some  Sequencing: Requires cues for some  Functional Mobility Training:  Bed Mobility:  Bed Mobility Training  Bed Mobility Training: Yes  Rolling: Stand-by assistance  Supine to Sit: Stand-by assistance  Sit to Supine: Stand-by  needed;Demonstrated understanding    Danielle Lugo, NATHAN  Minutes: 27

## 2024-11-22 NOTE — PROGRESS NOTES
Nephrology follow-up          Patient: Kimber Morris MRN: [id number]  SSN: [id number]    YOB: 1945  Age: 78 y.o.  Sex: female      Subjective:   The patient is seen at the bedside  She looks comfortable  Blood pressures are okay  On room air  Worsening ERUM  I will hold Lasix which she takes 20 mg daily.    Past Medical History:   Diagnosis Date    Asthma     Chest discomfort     pt states off & on for few months with or without activity    CHF (congestive heart failure) (HCC)     DM (diabetes mellitus) (HCC)     GERD (gastroesophageal reflux disease)     HTN (hypertension)     Thyroid disease      Past Surgical History:   Procedure Laterality Date    HYSTERECTOMY (CERVIX STATUS UNKNOWN)      MOHS SURGERY        Family History   Problem Relation Age of Onset    Hypertension Mother     Diabetes Mother     Heart Disease Mother      Social History     Tobacco Use    Smoking status: Never    Smokeless tobacco: Never   Substance Use Topics    Alcohol use: No      Current Facility-Administered Medications   Medication Dose Route Frequency Provider Last Rate Last Admin    hydrALAZINE (APRESOLINE) injection 5 mg  5 mg IntraVENous Q6H PRN Lore Alexandre PA-C   5 mg at 11/21/24 0107    piperacillin-tazobactam (ZOSYN) 3,375 mg in sodium chloride 0.9 % 50 mL IVPB (mini-bag)  3,375 mg IntraVENous Q8H Bulmaro Joseph MD   Stopped at 11/22/24 1209    apixaban (ELIQUIS) tablet 2.5 mg  2.5 mg Oral BID Trista Osullivan R, DO   2.5 mg at 11/22/24 0750    polyethylene glycol (GLYCOLAX) packet 17 g  17 g Oral Daily Trista Osullivan R, DO   17 g at 11/22/24 0750    bisacodyl (DULCOLAX) suppository 10 mg  10 mg Rectal Daily PRN Trista Osullivan R, DO        furosemide (LASIX) tablet 20 mg  20 mg Oral Daily Trista Osullivan R, DO   20 mg at 11/22/24 0749    sodium chloride flush 0.9 % injection 5-40 mL  5-40 mL IntraVENous 2 times per day Josi Holloway APRN - CNP   10 mL at 11/22/24 0758    sodium chloride flush 0.9 %

## 2024-11-22 NOTE — PROGRESS NOTES
OCCUPATIONAL THERAPY TREATMENT  Patient: Kimber Morris (78 y.o. female)  Date: 11/22/2024  Primary Diagnosis: Respiratory distress [R06.03]  Hypoxia [R09.02]  Atrial fibrillation with rapid ventricular response (HCC) [I48.91]  Acute respiratory failure with hypoxia [J96.01]  Acute on chronic congestive heart failure, unspecified heart failure type (HCC) [I50.9]       Precautions: Fall Risk                Recommendations for nursing mobility: Out of bed to chair for meals, Encourage HEP in prep for ADLs/mobility; see handout for details, Use of bed/chair alarm for safety, AD and gt belt for bed to chair , Amb to bathroom with AD and gait belt, and Assist x1    In place during session: Peripheral IV, External Catheter, and EKG/telemetry   Chart, occupational therapy assessment, plan of care, and goals were reviewed.  ASSESSMENT  Patient continues with skilled OT services and is progressing towards goals. Pt presented semi supine upon SEXTON arrival, agreeable to session. Pt A&O x 2. Orientation provided w/ poor  carry over noted. Pt transferred to EOB and reported she had incontinent BM but wanted to ambulate to the bathroom.  Pt required max verbal and tactile cues to use walker safely.  Pt completed toileting and standing hygiene.  Pt ambulated to sink and stood to bathe B LE.  Pt tolerated standing at the sink for ~10 minutes with fair standing balance. Pt returned to bed and left with lunch tray. (See below for objective details and assist levels).     Overall pt tolerated session fair today with toileting and LB bathing.  Pt continues with decrease cognition increasing her risk for falls.  Current OT recommendations for discharge Moderate intensity short-term skilled occupational therapy up to 5x/week. Will continue to benefit from skilled OT services, and will continue to progress as tolerated.      Start of Session   SPO2 (%) Unable to assess   Heart Rate (BPM) 66     GOALS:    Problem: Occupational Therapy -

## 2024-11-22 NOTE — PROGRESS NOTES
Pulmonary and Critical Care progress note    Subjective:   Consult Note: 11/22/2024 @no control      Chief Complaint:   Chief Complaint   Patient presents with    Respiratory Distress    Blood Infection        This patient has been seen and evaluated at the request of Dr. Joseph     Lying in bed comfortably  Awake and alert  On room air  No acute distress    Review of Systems:  Pertinent items are noted in HPI.    Past Medical History:   Diagnosis Date    Asthma     Chest discomfort     pt states off & on for few months with or without activity    CHF (congestive heart failure) (HCC)     DM (diabetes mellitus) (HCC)     GERD (gastroesophageal reflux disease)     HTN (hypertension)     Thyroid disease      Past Surgical History:   Procedure Laterality Date    HYSTERECTOMY (CERVIX STATUS UNKNOWN)      MOHS SURGERY        Family History   Problem Relation Age of Onset    Hypertension Mother     Diabetes Mother     Heart Disease Mother      Social History     Tobacco Use    Smoking status: Never    Smokeless tobacco: Never   Substance Use Topics    Alcohol use: No      Current Facility-Administered Medications   Medication Dose Route Frequency Provider Last Rate Last Admin    hydrALAZINE (APRESOLINE) injection 5 mg  5 mg IntraVENous Q6H PRN Lore Alexandre PA-C   5 mg at 11/21/24 0107    piperacillin-tazobactam (ZOSYN) 3,375 mg in sodium chloride 0.9 % 50 mL IVPB (mini-bag)  3,375 mg IntraVENous Q8H Bulmaro Joseph MD 12.5 mL/hr at 11/22/24 0756 3,375 mg at 11/22/24 0756    apixaban (ELIQUIS) tablet 2.5 mg  2.5 mg Oral BID Trista Osullivan R, DO   2.5 mg at 11/22/24 0750    polyethylene glycol (GLYCOLAX) packet 17 g  17 g Oral Daily Trista Osullivan R, DO   17 g at 11/22/24 0750    bisacodyl (DULCOLAX) suppository 10 mg  10 mg Rectal Daily PRN Trista Osullivan R, DO        furosemide (LASIX) tablet 20 mg  20 mg Oral Daily Trista Osullivan R, DO   20 mg at 11/22/24 0749    sodium chloride flush 0.9 % injection 5-40 mL  5-40 mL  Hepatitis C Ab 0.17 Index    Hep C Ab Interp NONREACTIVE NONREACTIVE   POCT Glucose    Collection Time: 11/21/24  8:04 PM   Result Value Ref Range    POC Glucose 142 (H) 65 - 100 mg/dL    Performed by: Steve Daniel    Comprehensive Metabolic Panel    Collection Time: 11/22/24  3:15 AM   Result Value Ref Range    Sodium 139 136 - 145 mmol/L    Potassium 4.3 3.5 - 5.1 mmol/L    Chloride 103 97 - 108 mmol/L    CO2 32 21 - 32 mmol/L    Anion Gap 4 2 - 12 mmol/L    Glucose 116 (H) 65 - 100 mg/dL    BUN 35 (H) 6 - 20 mg/dL    Creatinine 1.91 (H) 0.55 - 1.02 mg/dL    BUN/Creatinine Ratio 18 12 - 20      Est, Glom Filt Rate 26 (L) >60 ml/min/1.73m2    Calcium 9.2 8.5 - 10.1 mg/dL    Total Bilirubin 1.0 0.2 - 1.0 mg/dL     (H) 15 - 37 U/L     (H) 12 - 78 U/L    Alk Phosphatase 83 45 - 117 U/L    Total Protein 7.0 6.4 - 8.2 g/dL    Albumin 2.6 (L) 3.5 - 5.0 g/dL    Globulin 4.4 (H) 2.0 - 4.0 g/dL    Albumin/Globulin Ratio 0.6 (L) 1.1 - 2.2     Magnesium    Collection Time: 11/22/24  3:15 AM   Result Value Ref Range    Magnesium 2.3 1.6 - 2.4 mg/dL   Phosphorus    Collection Time: 11/22/24  3:15 AM   Result Value Ref Range    Phosphorus 3.1 2.6 - 4.7 mg/dL   CBC with Auto Differential    Collection Time: 11/22/24  3:15 AM   Result Value Ref Range    WBC 6.9 3.6 - 11.0 K/uL    RBC 4.23 3.80 - 5.20 M/uL    Hemoglobin 12.9 11.5 - 16.0 g/dL    Hematocrit 40.4 35.0 - 47.0 %    MCV 95.5 80.0 - 99.0 FL    MCH 30.5 26.0 - 34.0 PG    MCHC 31.9 30.0 - 36.5 g/dL    RDW 14.5 11.5 - 14.5 %    Platelets 187 150 - 400 K/uL    MPV 10.2 8.9 - 12.9 FL    Nucleated RBCs 0.0 0.0  WBC    nRBC 0.00 0.00 - 0.01 K/uL    Neutrophils % 58 32 - 75 %    Lymphocytes % 27 12 - 49 %    Monocytes % 11 5 - 13 %    Eosinophils % 3 0 - 7 %    Basophils % 0 0 - 1 %    Immature Granulocytes % 1 (H) 0 - 0.5 %    Neutrophils Absolute 4.0 1.8 - 8.0 K/UL    Lymphocytes Absolute 1.9 0.8 - 3.5 K/UL    Monocytes Absolute 0.7 0.0 - 1.0 K/UL

## 2024-11-22 NOTE — PLAN OF CARE
Problem: Safety - Adult  Goal: Free from fall injury  11/22/2024 0835 by Tawanda Tang, RN  Outcome: Progressing  11/21/2024 2148 by Joel Menjivar RN  Outcome: Progressing     Problem: Chronic Conditions and Co-morbidities  Goal: Patient's chronic conditions and co-morbidity symptoms are monitored and maintained or improved  11/22/2024 0835 by Tawanda Tang RN  Outcome: Progressing  11/21/2024 2148 by Joel Menjivar RN  Outcome: Progressing     Problem: Discharge Planning  Goal: Discharge to home or other facility with appropriate resources  11/22/2024 0835 by Tawanda Tang RN  Outcome: Progressing  11/21/2024 2148 by Joel Menjivar RN  Outcome: Progressing     Problem: Skin/Tissue Integrity  Goal: Absence of new skin breakdown  Description: 1.  Monitor for areas of redness and/or skin breakdown  2.  Assess vascular access sites hourly  3.  Every 4-6 hours minimum:  Change oxygen saturation probe site  4.  Every 4-6 hours:  If on nasal continuous positive airway pressure, respiratory therapy assess nares and determine need for appliance change or resting period.  11/22/2024 0835 by Tawanda Tang, RN  Outcome: Progressing  11/21/2024 2148 by Joel Menjivar RN  Outcome: Progressing     Problem: ABCDS Injury Assessment  Goal: Absence of physical injury  11/22/2024 0835 by Tawanda Tang, RN  Outcome: Progressing  11/21/2024 2148 by Joel Menjivar RN  Outcome: Progressing     Problem: Anxiety  Goal: Will report anxiety at manageable levels  Description: INTERVENTIONS:  1. Administer medication as ordered  2. Teach and rehearse alternative coping skills  3. Provide emotional support with 1:1 interaction with staff  11/22/2024 0835 by Tawanda Tang, RN  Outcome: Progressing  11/21/2024 2148 by Joel Menjivar RN  Outcome: Progressing     Problem: Coping  Goal: Pt/Family able to verbalize concerns and demonstrate effective coping strategies  Description: INTERVENTIONS:  1. Assist

## 2024-11-22 NOTE — PROGRESS NOTES
Hospitalist Progress Note    NAME:   Kimber Morris   : 1945   MRN: [id number]     Date/Time: 2024 5:44 AM  Patient PCP: Jennifer Horn FNP    Estimated discharge date:  Barriers:       Assessment / Plan:    Acute respiratory failure  Off of BiPAP and tolerating room air.   Currently does not seem to be on supplemental oxygen.  Secondary to bilateral pneumonia/ARDS, A-fib RVR and acute congestive heart failure.    Sepsis  Significantly elevated Pro-Noble, lactic acidosis along with leukocytosis initially secondary to likely bilateral pneumonia and ARDS.  Patient did not require any pressors during hospitalization.  Continuing to improve with downtrending inflammatory markers and Pro-Noble.    Acute on chronic systolic CHF  Transition from IV Lasix 40 Mg twice daily to p.o. Lasix daily.  Known EF of 25% with severe cardiomyopathy noted.  Continue strict ins and outs.  Associated with significant congestive hepatitis and elevated LFTs.  Continue metoprolol.    A-fib RVR  Dependent on amiodarone gtt. now weaned off.  Appreciate cardiology consultation and currently controlled on metoprolol.  Continue Eliquis 2.5 Mg twice daily.    Bilateral pneumonia  Initially associated with ARDS requiring BiPAP.  Significant improved and currently tolerating room air.  Received 4 days of IV Zosyn.  Will continue IV Zosyn as patient continues to have elevated Pro-Noble and lactic acid.  Appreciate pulmonology consultation.    ERUM/CKD 3  Appreciate nephrology consultation.  Continue to monitor with IV diuresis however creatinine slowly improving denoting likely cardiorenal syndrome.  Continued on Lasix 20 Mg daily.    Acute hepatitis  Likely secondary to hepatic congestion from severe heart failure.  Initially presented with AST/ALT of greater than 2000.  Negative acute hepatitis panel noted.    Full CODE STATUS    DVT prophylaxis  Heparin SC      Medical Decision Making:   I personally reviewed labs: CBC, CMP,  of old records today    NO  Reviewed patient's current orders and MAR    YES  PMH/SH reviewed - no change compared to H&P    Procedures: see electronic medical records for all procedures/Xrays and details which were not copied into this note but were reviewed prior to creation of Plan.      LABS:  I reviewed today's most current labs and imaging studies.  Pertinent labs include:  Recent Labs     11/20/24 0410 11/21/24 0313 11/22/24 0315   WBC 11.9* 7.2 6.9   HGB 12.3 13.4 12.9   HCT 38.4 42.0 40.4    210 187     Recent Labs     11/20/24 0410 11/21/24 0313 11/22/24 0315    139 139   K 3.7 3.4* 4.3    102 103   CO2 36* 30 32   GLUCOSE 103* 112* 116*   BUN 38* 30* 35*   CREATININE 2.11* 1.78* 1.91*   CALCIUM 9.5 9.3 9.2   MG 2.1 2.2 2.3   PHOS 3.4 3.2 3.1   BILITOT 1.3* 1.2* 1.0   * 329* 158*   ALT 1,362* 1,021* 717*       Signed: SHIRA LOYA MD

## 2024-11-22 NOTE — PROGRESS NOTES
Progress Note      11/22/2024 9:41 AM  NAME: Kimber Morris   MRN:[id number]   Admit Diagnosis: Respiratory distress [R06.03]  Hypoxia [R09.02]  Atrial fibrillation with rapid ventricular response (HCC) [I48.91]  Acute respiratory failure with hypoxia [J96.01]  Acute on chronic congestive heart failure, unspecified heart failure type (HCC) [I50.9]      Subjective:   Chart reviewed.  Patient examined.  Patient feels much better.  She is on room air and shortness of breath has resolved completely.    Review of Systems:    Symptom Y/N Comments  Symptom Y/N Comments   Fever/Chills n   Chest Pain n    Poor Appetite    Edema     Cough    Abdominal Pain n    Sputum    Joint Pain     SOB/STEPHENS n   Pruritis/Rash     Nausea/vomit    Other     Diarrhea         Constipation           Could NOT obtain due to:      Objective:          Physical Exam:    Last 24hrs VS reviewed since prior progress note. Most recent are:    /74   Pulse 79   Temp 97.3 °F (36.3 °C) (Oral)   Resp 15   Ht 1.58 m (5' 2.21\")   Wt 53.1 kg (117 lb 1 oz)   SpO2 100%   BMI 21.27 kg/m²     Intake/Output Summary (Last 24 hours) at 11/22/2024 0941  Last data filed at 11/22/2024 0520  Gross per 24 hour   Intake --   Output 200 ml   Net -200 ml        General Appearance: Well developed, well nourished, alert & oriented x 3,    no acute distress.  Ears/Nose/Mouth/Throat: Hearing grossly normal.  Neck: Supple.  Chest: Lungs clear to auscultation bilaterally.  Cardiovascular: Regular rate and rhythm, S1,S2 normal, no murmur.  Abdomen: Soft, non-tender, bowel sounds are active.  Extremities: No edema bilaterally.  Skin: Warm and dry.    []         Post-cath site without hematoma, bruit, tenderness, or thrill.  Distal pulses intact.    PMH/SH reviewed - no change compared to H&P    Data Review    Telemetry: normal sinus rhythm     EKG:   []  No new EKG for review    Lab Data Personally Reviewed:    Recent Labs     11/21/24 0313 11/22/24 0315   WBC 7.2  6.9   HGB 13.4 12.9   HCT 42.0 40.4    187     No results for input(s): \"INR\", \"PROTIME\", \"APTT\" in the last 72 hours.   Recent Labs     11/20/24 0410 11/21/24 0313 11/22/24 0315    139 139   K 3.7 3.4* 4.3    102 103   CO2 36* 30 32   BUN 38* 30* 35*   CREATININE 2.11* 1.78* 1.91*   GLUCOSE 103* 112* 116*   CALCIUM 9.5 9.3 9.2   MG 2.1 2.2 2.3     No results for input(s): \"CKTOTAL\", \"CKMB\", \"TROPONINI\", \"BNP\", \"PROBNP\", \"NTPROBNP\" in the last 72 hours.    Invalid input(s): \"CKINDEX\"  No results found for: \"CHOL\", \"TRIG\", \"HDL\", \"VLDL\", \"CHOLHDLRATIO\"    Recent Labs     11/20/24 0410 11/21/24 0313 11/22/24 0315   * 329* 158*   ALKPHOS 96 87 83   GLOB 4.4* 4.5* 4.4*     No results for input(s): \"PH\", \"PCO2\", \"PO2\" in the last 72 hours.    Medications Personally Reviewed:    Current Facility-Administered Medications   Medication Dose Route Frequency    hydrALAZINE (APRESOLINE) injection 5 mg  5 mg IntraVENous Q6H PRN    piperacillin-tazobactam (ZOSYN) 3,375 mg in sodium chloride 0.9 % 50 mL IVPB (mini-bag)  3,375 mg IntraVENous Q8H    apixaban (ELIQUIS) tablet 2.5 mg  2.5 mg Oral BID    polyethylene glycol (GLYCOLAX) packet 17 g  17 g Oral Daily    bisacodyl (DULCOLAX) suppository 10 mg  10 mg Rectal Daily PRN    furosemide (LASIX) tablet 20 mg  20 mg Oral Daily    sodium chloride flush 0.9 % injection 5-40 mL  5-40 mL IntraVENous 2 times per day    sodium chloride flush 0.9 % injection 5-40 mL  5-40 mL IntraVENous PRN    0.9 % sodium chloride infusion   IntraVENous PRN    ondansetron (ZOFRAN-ODT) disintegrating tablet 4 mg  4 mg Oral Q8H PRN    Or    ondansetron (ZOFRAN) injection 4 mg  4 mg IntraVENous Q6H PRN    polyethylene glycol (GLYCOLAX) packet 17 g  17 g Oral Daily PRN    acetaminophen (TYLENOL) tablet 650 mg  650 mg Oral Q6H PRN    Or    acetaminophen (TYLENOL) suppository 650 mg  650 mg Rectal Q6H PRN    potassium chloride 20 mEq/50 mL IVPB (Central Line)  20 mEq IntraVENous

## 2024-11-23 VITALS
BODY MASS INDEX: 21.54 KG/M2 | RESPIRATION RATE: 17 BRPM | DIASTOLIC BLOOD PRESSURE: 67 MMHG | HEART RATE: 85 BPM | WEIGHT: 117.06 LBS | TEMPERATURE: 97.9 F | OXYGEN SATURATION: 98 % | HEIGHT: 62 IN | SYSTOLIC BLOOD PRESSURE: 109 MMHG

## 2024-11-23 LAB
ALBUMIN SERPL-MCNC: 2.6 G/DL (ref 3.5–5)
ALBUMIN/GLOB SERPL: 0.6 (ref 1.1–2.2)
ALP SERPL-CCNC: 71 U/L (ref 45–117)
ALT SERPL-CCNC: 507 U/L (ref 12–78)
ANION GAP SERPL CALC-SCNC: 3 MMOL/L (ref 2–12)
AST SERPL W P-5'-P-CCNC: 84 U/L (ref 15–37)
BASOPHILS # BLD: 0 K/UL (ref 0–0.1)
BASOPHILS NFR BLD: 0 % (ref 0–1)
BILIRUB SERPL-MCNC: 0.8 MG/DL (ref 0.2–1)
BUN SERPL-MCNC: 31 MG/DL (ref 6–20)
BUN/CREAT SERPL: 18 (ref 12–20)
CA-I BLD-MCNC: 9.1 MG/DL (ref 8.5–10.1)
CHLORIDE SERPL-SCNC: 106 MMOL/L (ref 97–108)
CO2 SERPL-SCNC: 32 MMOL/L (ref 21–32)
CREAT SERPL-MCNC: 1.77 MG/DL (ref 0.55–1.02)
CRP SERPL-MCNC: 0.74 MG/DL (ref 0–0.3)
DIFFERENTIAL METHOD BLD: ABNORMAL
EOSINOPHIL # BLD: 0.2 K/UL (ref 0–0.4)
EOSINOPHIL NFR BLD: 3 % (ref 0–7)
ERYTHROCYTE [DISTWIDTH] IN BLOOD BY AUTOMATED COUNT: 14.5 % (ref 11.5–14.5)
GLOBULIN SER CALC-MCNC: 4.3 G/DL (ref 2–4)
GLUCOSE BLD STRIP.AUTO-MCNC: 261 MG/DL (ref 65–100)
GLUCOSE BLD STRIP.AUTO-MCNC: 324 MG/DL (ref 65–100)
GLUCOSE BLD STRIP.AUTO-MCNC: 73 MG/DL (ref 65–100)
GLUCOSE BLD STRIP.AUTO-MCNC: 76 MG/DL (ref 65–100)
GLUCOSE BLD STRIP.AUTO-MCNC: 86 MG/DL (ref 65–100)
GLUCOSE SERPL-MCNC: 123 MG/DL (ref 65–100)
HCT VFR BLD AUTO: 39.9 % (ref 35–47)
HGB BLD-MCNC: 12.5 G/DL (ref 11.5–16)
IMM GRANULOCYTES # BLD AUTO: 0 K/UL (ref 0–0.04)
IMM GRANULOCYTES NFR BLD AUTO: 1 % (ref 0–0.5)
LYMPHOCYTES # BLD: 1.5 K/UL (ref 0.8–3.5)
LYMPHOCYTES NFR BLD: 26 % (ref 12–49)
MAGNESIUM SERPL-MCNC: 2.3 MG/DL (ref 1.6–2.4)
MCH RBC QN AUTO: 30.5 PG (ref 26–34)
MCHC RBC AUTO-ENTMCNC: 31.3 G/DL (ref 30–36.5)
MCV RBC AUTO: 97.3 FL (ref 80–99)
MONOCYTES # BLD: 0.7 K/UL (ref 0–1)
MONOCYTES NFR BLD: 12 % (ref 5–13)
NEUTS SEG # BLD: 3.5 K/UL (ref 1.8–8)
NEUTS SEG NFR BLD: 58 % (ref 32–75)
NRBC # BLD: 0 K/UL (ref 0–0.01)
NRBC BLD-RTO: 0 PER 100 WBC
PERFORMED BY:: ABNORMAL
PERFORMED BY:: ABNORMAL
PERFORMED BY:: NORMAL
PHOSPHATE SERPL-MCNC: 3.2 MG/DL (ref 2.6–4.7)
PLATELET # BLD AUTO: 183 K/UL (ref 150–400)
PMV BLD AUTO: 10.2 FL (ref 8.9–12.9)
POTASSIUM SERPL-SCNC: 3.9 MMOL/L (ref 3.5–5.1)
PROCALCITONIN SERPL-MCNC: 3.27 NG/ML
PROT SERPL-MCNC: 6.9 G/DL (ref 6.4–8.2)
RBC # BLD AUTO: 4.1 M/UL (ref 3.8–5.2)
SODIUM SERPL-SCNC: 141 MMOL/L (ref 136–145)
WBC # BLD AUTO: 5.9 K/UL (ref 3.6–11)

## 2024-11-23 PROCEDURE — 6370000000 HC RX 637 (ALT 250 FOR IP): Performed by: STUDENT IN AN ORGANIZED HEALTH CARE EDUCATION/TRAINING PROGRAM

## 2024-11-23 PROCEDURE — 85025 COMPLETE CBC W/AUTO DIFF WBC: CPT

## 2024-11-23 PROCEDURE — 2580000003 HC RX 258: Performed by: NURSE PRACTITIONER

## 2024-11-23 PROCEDURE — 2580000003 HC RX 258: Performed by: INTERNAL MEDICINE

## 2024-11-23 PROCEDURE — 84145 PROCALCITONIN (PCT): CPT

## 2024-11-23 PROCEDURE — 36415 COLL VENOUS BLD VENIPUNCTURE: CPT

## 2024-11-23 PROCEDURE — 83735 ASSAY OF MAGNESIUM: CPT

## 2024-11-23 PROCEDURE — 6360000002 HC RX W HCPCS: Performed by: INTERNAL MEDICINE

## 2024-11-23 PROCEDURE — 86140 C-REACTIVE PROTEIN: CPT

## 2024-11-23 PROCEDURE — 80053 COMPREHEN METABOLIC PANEL: CPT

## 2024-11-23 PROCEDURE — 84100 ASSAY OF PHOSPHORUS: CPT

## 2024-11-23 RX ADMIN — PIPERACILLIN AND TAZOBACTAM 3375 MG: 3; .375 INJECTION, POWDER, LYOPHILIZED, FOR SOLUTION INTRAVENOUS at 01:32

## 2024-11-23 RX ADMIN — METOPROLOL SUCCINATE 25 MG: 25 TABLET, EXTENDED RELEASE ORAL at 11:42

## 2024-11-23 RX ADMIN — PIPERACILLIN AND TAZOBACTAM 3375 MG: 3; .375 INJECTION, POWDER, LYOPHILIZED, FOR SOLUTION INTRAVENOUS at 11:44

## 2024-11-23 RX ADMIN — POLYETHYLENE GLYCOL 3350 17 G: 17 POWDER, FOR SOLUTION ORAL at 11:43

## 2024-11-23 RX ADMIN — LEVOTHYROXINE SODIUM 75 MCG: 0.07 TABLET ORAL at 06:08

## 2024-11-23 RX ADMIN — SODIUM CHLORIDE, PRESERVATIVE FREE 10 ML: 5 INJECTION INTRAVENOUS at 11:43

## 2024-11-23 RX ADMIN — APIXABAN 2.5 MG: 5 TABLET, FILM COATED ORAL at 11:42

## 2024-11-23 ASSESSMENT — PAIN SCALES - GENERAL: PAINLEVEL_OUTOF10: 0

## 2024-11-23 NOTE — PROGRESS NOTES
Pulmonary and Critical Care progress note    Subjective:          This patient has been seen and evaluated at the request of Dr. Joseph     78-year-old -American lady  I'm asked to see for acute on chronic respiratory failure and COPD exacerbation  Patient was admitted to the ICU now being transferred out  Seen by our group during 5/24 hospitalization with left lower lobe atelectasis     Ex-smoker.  Half a pack a day for 60 years.  Quit smoking 2 years back.  Has a history of COPD    Patient admitted 11/18/2024 with hypoxic respiratory failure requiring BiPAP and Lasix along with amiodarone.  CT chest showed no PE but did show bilateral pneumonia and pulmonary edema and airway debris suspicious for aspiration pneumonia along with bilateral pleural effusions.  Treated with BiPAP and above regimen.  Now stable and being transferred out of the ICU.    Currently on room air    Chest x-ray 1119 reviewed personally and showed:  Cardiomegaly  Bilateral basal infiltrates suspicious for pneumonia versus pleural effusion versus atelectasis  WBC count is normal.  Patient is on room air now.  She has received 7 days of IV Zosyn.  Should be able to go home on 5 more days of Augmentin 875 mg twice daily for 5 days    Review of Systems:  Pertinent items are noted in HPI.    Past Medical History:   Diagnosis Date    Asthma     Chest discomfort     pt states off & on for few months with or without activity    CHF (congestive heart failure) (HCC)     DM (diabetes mellitus) (HCC)     GERD (gastroesophageal reflux disease)     HTN (hypertension)     Thyroid disease      Past Surgical History:   Procedure Laterality Date    HYSTERECTOMY (CERVIX STATUS UNKNOWN)      [facility]        Family History   Problem Relation Age of Onset    Hypertension Mother     Diabetes Mother     Heart Disease Mother      Social History     Tobacco Use    Smoking status: Never    Smokeless tobacco: Never   Substance Use Topics    Alcohol use: No     IVPB premix  2,000 mg IntraVENous PRN Jewel, Josi, APRN - CNP        sodium phosphate 15 mmol in sodium chloride 0.9 % 250 mL IVPB  15 mmol IntraVENous PRN Holloway, Josi, APRN - CNP        ipratropium 0.5 mg-albuterol 2.5 mg (DUONEB) nebulizer solution 1 Dose  1 Dose Inhalation Q4H PRN Jewel, Josi, APRN - CNP        insulin lispro (HUMALOG,ADMELOG) injection vial 0-8 Units  0-8 Units SubCUTAneous 4x Daily AC & HS Holloway, Josi, APRN - CNP        glucose chewable tablet 16 g  4 tablet Oral PRN Holloway, Josi, APRN - CNP        dextrose bolus 10% 125 mL  125 mL IntraVENous PRN Holloway, Josi, APRN - CNP   Stopped at 24 0816    Or    dextrose bolus 10% 250 mL  250 mL IntraVENous PRN Holloway, Josi, APRN - CNP   Stopped at 24 0857    glucagon injection 1 mg  1 mg SubCUTAneous PRN Holloway, Josi, APRN - CNP        dextrose 10 % infusion   IntraVENous Continuous PRN Holloway, Josi, APRN - CNP        metoprolol succinate (TOPROL XL) extended release tablet 25 mg  25 mg Oral Daily Trista Osullivan R, DO   25 mg at 24 1142    levothyroxine (SYNTHROID) tablet 75 mcg  75 mcg Oral QAM AC ShiTrista brown R, DO   75 mcg at 24 0608          No Known Allergies        Objective:     Blood pressure 109/67, pulse 79, temperature 97.9 °F (36.6 °C), temperature source Oral, resp. rate 17, height 1.58 m (5' 2.21\"), weight 53.1 kg (117 lb 1 oz), SpO2 98%. Temp (24hrs), Av.7 °F (36.5 °C), Min:97.3 °F (36.3 °C), Max:98.2 °F (36.8 °C)      Intake and Output:  Current Shift: No intake/output data recorded.  Last 3 Shifts:  1901 -  0700  In: 1012.7 [P.O.:180; I.V.:178.5]  Out: 700 [Urine:700]  [unfilled]     Physical Exam:     General: Lying in bed comfortably, no acute distress  Eye: Reactive, symmetric  Throat and Neck: Supple  Lung: Reduced air entry bilaterally with prolonged exhalation but no wheezing.  Crackles in both lung bases  Heart: S1+S2.  No murmurs  Abdomen: soft, non-tender.

## 2024-11-23 NOTE — PLAN OF CARE
Problem: Safety - Adult  Goal: Free from fall injury  11/23/2024 1109 by Glenda Thorpe LPN  Outcome: Progressing  11/23/2024 1108 by Glenda Thorpe LPN  Outcome: Progressing  11/22/2024 2307 by Rosemary Le RN  Outcome: Progressing     Problem: Chronic Conditions and Co-morbidities  Goal: Patient's chronic conditions and co-morbidity symptoms are monitored and maintained or improved  11/23/2024 1109 by Glenda Thorpe LPN  Outcome: Progressing  11/23/2024 1108 by Glenda Thorpe LPN  Outcome: Progressing  11/22/2024 2307 by Rosemary Le RN  Outcome: Progressing  Flowsheets (Taken 11/22/2024 2128)  Care Plan - Patient's Chronic Conditions and Co-Morbidity Symptoms are Monitored and Maintained or Improved: Monitor and assess patient's chronic conditions and comorbid symptoms for stability, deterioration, or improvement     Problem: Discharge Planning  Goal: Discharge to home or other facility with appropriate resources  11/23/2024 1109 by Glenda Thorpe LPN  Outcome: Progressing  11/23/2024 1108 by Glenda Thorpe LPN  Outcome: Progressing  11/22/2024 2307 by Rosemary Le RN  Outcome: Progressing  Flowsheets (Taken 11/22/2024 2128)  Discharge to home or other facility with appropriate resources: Identify barriers to discharge with patient and caregiver     Problem: Skin/Tissue Integrity  Goal: Absence of new skin breakdown  Description: 1.  Monitor for areas of redness and/or skin breakdown  2.  Assess vascular access sites hourly  3.  Every 4-6 hours minimum:  Change oxygen saturation probe site  4.  Every 4-6 hours:  If on nasal continuous positive airway pressure, respiratory therapy assess nares and determine need for appliance change or resting period.  11/23/2024 1109 by Glenda Thorpe LPN  Outcome: Progressing  11/23/2024 1108 by Glenda Thorpe LPN  Outcome: Progressing  11/22/2024 2307 by Rosemary Le RN  Outcome: Progressing     Problem: ABCDS Injury

## 2024-11-23 NOTE — DISCHARGE SUMMARY
Discharge Summary    Name: Kimber Morris  [id number]  YOB: 1945 (Age: 78 y.o.)   Date of Admission: 11/16/2024  Date of Discharge: 11/23/2024  Attending Physician: Bulmaro Joseph MD    Discharge Diagnosis:     Consultations:  IP CONSULT TO CARDIOLOGY  IP CONSULT TO NEPHROLOGY  IP CONSULT TO PULMONOLOGY      Brief Admission History/Reason for Admission Per Faith Henson MD:     Chief Complaint / Reason for Physician Visit  \" Shortness of breath\".  Discussed with RN events overnight.      11/18  --  Critical Care Admission H&P  78-year-old female with medical history significant for CHF, A-fib, DM2, HTN, hypothyroidism, asthma, and GERD.  Patient presented to the ED today via EMS with complaint of abdominal pain and shortness of breath.  EMS noted patient to be hypoxic with oxygen saturation of 80% on room air.  They placed her on BiPAP and administered 40 mg IV Lasix and 150 mg bolus of amiodarone.      In the ED, CBC reflected WBC 13.2 with hemoglobin 12.1 and hematocrit 39.9.  D-dimer was elevated at 3.41.  Lactic acid elevated at 7.5 with repeat 5.0.  Creatinine 1.67 with BUN of 20, sodium 143, potassium 3.3, and chloride 110.  Glucose elevated at 301.       CT chest was performed due to concern for PE in the setting of acute hypoxia.  CT was negative for pulmonary embolism but revealed severe bilateral pneumonia with superimposed pulmonary edema.  CT also noted airway debris which would support diagnosis of aspiration pneumonia.  There were trace bilateral pleural effusions noted.     11/20  Patient is alert and oriented x 2 only, evaluated in ICU awaiting ICU transfer to telemetry bed on floor.  Patient states that her shortness of breath is significantly improved and denies any chest pain, fever/chills, vomiting.     Counseled on continue treatment with continued diuresis for acute heart failure along with A-fib RVR.  Patient is not aware or understand her  [address]  [phone]    Follow up in 3 week(s)          Total time in minutes spent coordinating this discharge (includes going over instructions, follow-up, prescriptions, and preparing report for sign off to her PCP) :  60 minutes

## 2024-11-23 NOTE — PROGRESS NOTES
Nephrology follow-up          Patient: Kimber Morris MRN: [id number]  SSN: [id number]    YOB: 1945  Age: 78 y.o.  Sex: female      Subjective:   The patient is seen at the bedside  She looks comfortable  Blood pressures are okay  On room air  Resolving ERUM  Held Lasix.    Past Medical History:   Diagnosis Date    Asthma     Chest discomfort     pt states off & on for few months with or without activity    CHF (congestive heart failure) (HCC)     DM (diabetes mellitus) (HCC)     GERD (gastroesophageal reflux disease)     HTN (hypertension)     Thyroid disease      Past Surgical History:   Procedure Laterality Date    HYSTERECTOMY (CERVIX STATUS UNKNOWN)      MOHS SURGERY        Family History   Problem Relation Age of Onset    Hypertension Mother     Diabetes Mother     Heart Disease Mother      Social History     Tobacco Use    Smoking status: Never    Smokeless tobacco: Never   Substance Use Topics    Alcohol use: No      Current Facility-Administered Medications   Medication Dose Route Frequency Provider Last Rate Last Admin    hydrALAZINE (APRESOLINE) injection 5 mg  5 mg IntraVENous Q6H PRN Lore Alexandre PA-C   5 mg at 11/21/24 0107    piperacillin-tazobactam (ZOSYN) 3,375 mg in sodium chloride 0.9 % 50 mL IVPB (mini-bag)  3,375 mg IntraVENous Q8H Bulmaro Joseph MD 12.5 mL/hr at 11/23/24 1144 3,375 mg at 11/23/24 1144    apixaban (ELIQUIS) tablet 2.5 mg  2.5 mg Oral BID Trista Osullivan R, DO   2.5 mg at 11/23/24 1142    polyethylene glycol (GLYCOLAX) packet 17 g  17 g Oral Daily Trista Osullivan R, DO   17 g at 11/23/24 1143    bisacodyl (DULCOLAX) suppository 10 mg  10 mg Rectal Daily PRN Trista Osullivan R, DO        [Held by provider] furosemide (LASIX) tablet 20 mg  20 mg Oral Daily Trista Osullivan R, DO   20 mg at 11/22/24 0749    sodium chloride flush 0.9 % injection 5-40 mL  5-40 mL IntraVENous 2 times per day Josi Holloway APRN - CNP   10 mL at 11/23/24 1143    sodium chloride flush

## 2024-11-26 PROBLEM — R06.03 RESPIRATORY DISTRESS: Status: ACTIVE | Noted: 2024-11-26

## 2025-01-30 ENCOUNTER — HOSPITAL ENCOUNTER (EMERGENCY)
Facility: HOSPITAL | Age: 80
Discharge: HOME | End: 2025-01-31
Attending: STUDENT IN AN ORGANIZED HEALTH CARE EDUCATION/TRAINING PROGRAM
Payer: MEDICARE

## 2025-01-30 DIAGNOSIS — F03.90 DEMENTIA WITHOUT BEHAVIORAL DISTURBANCE (HCC): ICD-10-CM

## 2025-01-30 DIAGNOSIS — I48.20 CHRONIC ATRIAL FIBRILLATION (HCC): Primary | ICD-10-CM

## 2025-01-30 LAB
ALBUMIN SERPL-MCNC: 2.7 G/DL (ref 3.5–5)
ALBUMIN/GLOB SERPL: 0.9 (ref 1.1–2.2)
ALP SERPL-CCNC: 41 U/L (ref 45–117)
ALT SERPL-CCNC: 11 U/L (ref 12–78)
ANION GAP SERPL CALC-SCNC: 5 MMOL/L (ref 2–12)
AST SERPL W P-5'-P-CCNC: ABNORMAL U/L (ref 15–37)
BASOPHILS # BLD: 0.02 K/UL (ref 0–0.1)
BASOPHILS NFR BLD: 0.4 % (ref 0–1)
BILIRUB SERPL-MCNC: 0.4 MG/DL (ref 0.2–1)
BUN SERPL-MCNC: 19 MG/DL (ref 6–20)
BUN/CREAT SERPL: 16 (ref 12–20)
CA-I BLD-MCNC: 8.2 MG/DL (ref 8.5–10.1)
CHLORIDE SERPL-SCNC: 114 MMOL/L (ref 97–108)
CO2 SERPL-SCNC: 27 MMOL/L (ref 21–32)
CREAT SERPL-MCNC: 1.21 MG/DL (ref 0.55–1.02)
DIFFERENTIAL METHOD BLD: NORMAL
EOSINOPHIL # BLD: 0.18 K/UL (ref 0–0.4)
EOSINOPHIL NFR BLD: 3.3 % (ref 0–7)
ERYTHROCYTE [DISTWIDTH] IN BLOOD BY AUTOMATED COUNT: 13.3 % (ref 11.5–14.5)
GLOBULIN SER CALC-MCNC: 3.1 G/DL (ref 2–4)
GLUCOSE SERPL-MCNC: 85 MG/DL (ref 65–100)
HCT VFR BLD AUTO: 40.4 % (ref 35–47)
HGB BLD-MCNC: 12.6 G/DL (ref 11.5–16)
IMM GRANULOCYTES # BLD AUTO: 0 K/UL (ref 0–0.04)
IMM GRANULOCYTES NFR BLD AUTO: 0 % (ref 0–0.5)
LYMPHOCYTES # BLD: 1.37 K/UL (ref 0.8–3.5)
LYMPHOCYTES NFR BLD: 25 % (ref 12–49)
MAGNESIUM SERPL-MCNC: 2.1 MG/DL (ref 1.6–2.4)
MCH RBC QN AUTO: 29.9 PG (ref 26–34)
MCHC RBC AUTO-ENTMCNC: 31.2 G/DL (ref 30–36.5)
MCV RBC AUTO: 95.7 FL (ref 80–99)
MONOCYTES # BLD: 0.7 K/UL (ref 0–1)
MONOCYTES NFR BLD: 12.8 % (ref 5–13)
NEUTS SEG # BLD: 3.22 K/UL (ref 1.8–8)
NEUTS SEG NFR BLD: 58.5 % (ref 32–75)
NRBC # BLD: 0 K/UL (ref 0–0.01)
NRBC BLD-RTO: 0 PER 100 WBC
PLATELET # BLD AUTO: 155 K/UL (ref 150–400)
PMV BLD AUTO: 10.5 FL (ref 8.9–12.9)
POTASSIUM SERPL-SCNC: ABNORMAL MMOL/L (ref 3.5–5.1)
PROT SERPL-MCNC: 5.8 G/DL (ref 6.4–8.2)
RBC # BLD AUTO: 4.22 M/UL (ref 3.8–5.2)
SODIUM SERPL-SCNC: 146 MMOL/L (ref 136–145)
TROPONIN I SERPL HS-MCNC: 26 NG/L (ref 0–51)
TROPONIN I SERPL HS-MCNC: 28 NG/L (ref 0–51)
WBC # BLD AUTO: 5.5 K/UL (ref 3.6–11)

## 2025-01-30 PROCEDURE — 85025 COMPLETE CBC W/AUTO DIFF WBC: CPT

## 2025-01-30 PROCEDURE — 83735 ASSAY OF MAGNESIUM: CPT

## 2025-01-30 PROCEDURE — 36415 COLL VENOUS BLD VENIPUNCTURE: CPT

## 2025-01-30 PROCEDURE — 99284 EMERGENCY DEPT VISIT MOD MDM: CPT

## 2025-01-30 PROCEDURE — 84132 ASSAY OF SERUM POTASSIUM: CPT

## 2025-01-30 PROCEDURE — 84484 ASSAY OF TROPONIN QUANT: CPT

## 2025-01-30 PROCEDURE — 93005 ELECTROCARDIOGRAM TRACING: CPT | Performed by: STUDENT IN AN ORGANIZED HEALTH CARE EDUCATION/TRAINING PROGRAM

## 2025-01-30 PROCEDURE — 80053 COMPREHEN METABOLIC PANEL: CPT

## 2025-01-30 ASSESSMENT — PAIN - FUNCTIONAL ASSESSMENT: PAIN_FUNCTIONAL_ASSESSMENT: NONE - DENIES PAIN

## 2025-01-31 VITALS
HEIGHT: 62 IN | RESPIRATION RATE: 16 BRPM | HEART RATE: 64 BPM | WEIGHT: 115 LBS | SYSTOLIC BLOOD PRESSURE: 172 MMHG | OXYGEN SATURATION: 100 % | TEMPERATURE: 98 F | BODY MASS INDEX: 21.16 KG/M2 | DIASTOLIC BLOOD PRESSURE: 92 MMHG

## 2025-01-31 LAB
APPEARANCE UR: CLEAR
BACTERIA URNS QL MICRO: NEGATIVE /HPF
BILIRUB UR QL: NEGATIVE
COLOR UR: ABNORMAL
EKG ATRIAL RATE: 85 BPM
EKG DIAGNOSIS: NORMAL
EKG Q-T INTERVAL: 360 MS
EKG QRS DURATION: 86 MS
EKG QTC CALCULATION (BAZETT): 433 MS
EKG R AXIS: 13 DEGREES
EKG T AXIS: 57 DEGREES
EKG VENTRICULAR RATE: 87 BPM
EPITH CASTS URNS QL MICRO: ABNORMAL /LPF
GLUCOSE UR STRIP.AUTO-MCNC: NEGATIVE MG/DL
HGB UR QL STRIP: NEGATIVE
KETONES UR QL STRIP.AUTO: NEGATIVE MG/DL
LEUKOCYTE ESTERASE UR QL STRIP.AUTO: NEGATIVE
MUCOUS THREADS URNS QL MICRO: NEGATIVE /LPF
NITRITE UR QL STRIP.AUTO: NEGATIVE
PH UR STRIP: 8 (ref 5–8)
POTASSIUM SERPL-SCNC: 4.6 MMOL/L (ref 3.5–5.1)
PROT UR STRIP-MCNC: NEGATIVE MG/DL
RBC #/AREA URNS HPF: ABNORMAL /HPF (ref 0–5)
SP GR UR REFRACTOMETRY: <1.005 (ref 1–1.03)
UROBILINOGEN UR QL STRIP.AUTO: 0.1 EU/DL (ref 0.1–1)
WBC URNS QL MICRO: ABNORMAL /HPF (ref 0–4)

## 2025-01-31 PROCEDURE — 81001 URINALYSIS AUTO W/SCOPE: CPT

## 2025-01-31 NOTE — ED PROVIDER NOTES
[facility] EMERGENCY DEPT  EMERGENCY DEPARTMENT HISTORY AND PHYSICAL EXAM      Date: 1/30/2025  Patient Name: Kimber Morris  MRN: [id number]  Birthdate 1945  Date of evaluation: 1/30/2025  Provider: Dorota Cueto MD   Note Started: 9:25 PM EST 1/30/25    HISTORY OF PRESENT ILLNESS     Chief Complaint   Patient presents with    Atrial Fibrillation    Agitation              History Provided By: Patient    HPI: Kimber Morris is a 79 y.o. female with a past medical history of A-fib, CHF, diabetes, hypertension,asthma, dementia presents via EMS for behavioral concerns.  According to EMS, patient was with family, she locked herself in the room for an hour and family were concerned about patient's safety and their own safety.    In the ED, patient is calm cooperative in no acute distress.  Denies any complaint at this time.  Denies headache, chest pain, shortness of breath, nausea vomiting lower extremity edema.        PAST MEDICAL HISTORY   Past Medical History:  Past Medical History:   Diagnosis Date    Asthma     Chest discomfort     pt states off & on for few months with or without activity    CHF (congestive heart failure) (HCC)     DM (diabetes mellitus) (HCC)     GERD (gastroesophageal reflux disease)     HTN (hypertension)     Thyroid disease        Past Surgical History:  Past Surgical History:   Procedure Laterality Date    HYSTERECTOMY (CERVIX STATUS UNKNOWN)      MOHS SURGERY         Family History:  Family History   Problem Relation Age of Onset    Hypertension Mother     Diabetes Mother     Heart Disease Mother        Social History:  Social History     Tobacco Use    Smoking status: Never    Smokeless tobacco: Never   Vaping Use    Vaping status: Never Used   Substance Use Topics    Alcohol use: No    Drug use: Never       Allergies:  No Known Allergies    PCP: Jennifer Horn FNP    Current Meds:   No current facility-administered medications for this encounter.     Current Outpatient Medications

## 2025-01-31 NOTE — DISCHARGE INSTRUCTIONS
Thank you for choosing our Emergency Department for your care.  It is our privilege to care for you in your time of need.  In the next several days, you may receive a survey via email or mailed to your home about your experience with our team.  We would greatly appreciate you taking a few minutes to complete the survey, as we use this information to learn what we have done well and what we could be doing better. Thank you for trusting us with your care!          Below you will find a list of your tests from today's visit.   Labs and Radiology Studies  Recent Results (from the past 12 hour(s))   CBC with Auto Differential    Collection Time: 01/30/25  9:15 PM   Result Value Ref Range    WBC 5.5 3.6 - 11.0 K/uL    RBC 4.22 3.80 - 5.20 M/uL    Hemoglobin 12.6 11.5 - 16.0 g/dL    Hematocrit 40.4 35.0 - 47.0 %    MCV 95.7 80.0 - 99.0 FL    MCH 29.9 26.0 - 34.0 PG    MCHC 31.2 30.0 - 36.5 g/dL    RDW 13.3 11.5 - 14.5 %    Platelets 155 150 - 400 K/uL    MPV 10.5 8.9 - 12.9 FL    Nucleated RBCs 0.0 0.0  WBC    nRBC 0.00 0.00 - 0.01 K/uL    Neutrophils % 58.5 32.0 - 75.0 %    Lymphocytes % 25.0 12.0 - 49.0 %    Monocytes % 12.8 5.0 - 13.0 %    Eosinophils % 3.3 0.0 - 7.0 %    Basophils % 0.4 0.0 - 1.0 %    Immature Granulocytes % 0.0 0 - 0.5 %    Neutrophils Absolute 3.22 1.80 - 8.00 K/UL    Lymphocytes Absolute 1.37 0.80 - 3.50 K/UL    Monocytes Absolute 0.70 0.00 - 1.00 K/UL    Eosinophils Absolute 0.18 0.00 - 0.40 K/UL    Basophils Absolute 0.02 0.00 - 0.10 K/UL    Immature Granulocytes Absolute 0.00 0.00 - 0.04 K/UL    Differential Type AUTOMATED     Troponin    Collection Time: 01/30/25  9:15 PM   Result Value Ref Range    Troponin, High Sensitivity 26 0 - 51 ng/L   Comprehensive Metabolic Panel    Collection Time: 01/30/25  9:15 PM   Result Value Ref Range    Sodium 146 (H) 136 - 145 mmol/L    Potassium Hemolyzed, Recollection Recommended 3.5 - 5.1 mmol/L    Chloride 114 (H) 97 - 108 mmol/L    CO2 27 21 -

## 2025-01-31 NOTE — ED TRIAGE NOTES
Lives with family, locked one in bedroom for about an hour\, family called with concerns for patient's safety and others. Incidental finding of pt in a-fib rvr. Family states \"Pt has a fibrillator that when it goes off, pt's HR is 25%.\" Family requesting her meds to be adjusted.     Ems had no issues with pt. She remained calm and cooperative.

## 2025-01-31 NOTE — ED NOTES
Pt has been pulling all cords off during time in ER. Pt very confused and climbing out of bed. Attempting to reorient pt each time with no success.

## 2025-04-05 ENCOUNTER — HOSPITAL ENCOUNTER (INPATIENT)
Facility: HOSPITAL | Age: 80
LOS: 1 days | DRG: 871 | End: 2025-04-06
Attending: EMERGENCY MEDICINE
Payer: MEDICARE

## 2025-04-05 ENCOUNTER — APPOINTMENT (OUTPATIENT)
Facility: HOSPITAL | Age: 80
DRG: 871 | End: 2025-04-05
Payer: MEDICARE

## 2025-04-05 DIAGNOSIS — I48.3 TYPICAL ATRIAL FLUTTER (HCC): ICD-10-CM

## 2025-04-05 DIAGNOSIS — N18.9 ACUTE RENAL FAILURE SUPERIMPOSED ON CHRONIC KIDNEY DISEASE, UNSPECIFIED ACUTE RENAL FAILURE TYPE, UNSPECIFIED CKD STAGE: ICD-10-CM

## 2025-04-05 DIAGNOSIS — J18.9 PNEUMONIA DUE TO INFECTIOUS ORGANISM, UNSPECIFIED LATERALITY, UNSPECIFIED PART OF LUNG: ICD-10-CM

## 2025-04-05 DIAGNOSIS — N17.9 ACUTE RENAL FAILURE SUPERIMPOSED ON CHRONIC KIDNEY DISEASE, UNSPECIFIED ACUTE RENAL FAILURE TYPE, UNSPECIFIED CKD STAGE: ICD-10-CM

## 2025-04-05 DIAGNOSIS — A41.9 SEVERE SEPSIS: Primary | ICD-10-CM

## 2025-04-05 DIAGNOSIS — R65.20 SEVERE SEPSIS: Primary | ICD-10-CM

## 2025-04-05 LAB
ALBUMIN SERPL-MCNC: 2.9 G/DL (ref 3.5–5)
ALBUMIN/GLOB SERPL: 0.7 (ref 1.1–2.2)
ALP SERPL-CCNC: 126 U/L (ref 45–117)
ALT SERPL-CCNC: 79 U/L (ref 12–78)
ANION GAP SERPL CALC-SCNC: 4 MMOL/L (ref 2–12)
AST SERPL W P-5'-P-CCNC: 52 U/L (ref 15–37)
BASOPHILS # BLD: 0.01 K/UL (ref 0–0.1)
BASOPHILS NFR BLD: 0.1 % (ref 0–1)
BILIRUB SERPL-MCNC: 1.4 MG/DL (ref 0.2–1)
BNP SERPL-MCNC: ABNORMAL PG/ML
BUN SERPL-MCNC: 42 MG/DL (ref 6–20)
BUN/CREAT SERPL: 17 (ref 12–20)
CA-I BLD-MCNC: 9.3 MG/DL (ref 8.5–10.1)
CHLORIDE SERPL-SCNC: 111 MMOL/L (ref 97–108)
CO2 SERPL-SCNC: 27 MMOL/L (ref 21–32)
CREAT SERPL-MCNC: 2.45 MG/DL (ref 0.55–1.02)
DIFFERENTIAL METHOD BLD: ABNORMAL
EOSINOPHIL # BLD: 0 K/UL (ref 0–0.4)
EOSINOPHIL NFR BLD: 0 % (ref 0–7)
ERYTHROCYTE [DISTWIDTH] IN BLOOD BY AUTOMATED COUNT: 15.1 % (ref 11.5–14.5)
FLUAV RNA SPEC QL NAA+PROBE: NOT DETECTED
FLUBV RNA SPEC QL NAA+PROBE: NOT DETECTED
GLOBULIN SER CALC-MCNC: 4.1 G/DL (ref 2–4)
GLUCOSE BLD STRIP.AUTO-MCNC: 147 MG/DL (ref 65–100)
GLUCOSE SERPL-MCNC: 166 MG/DL (ref 65–100)
HCT VFR BLD AUTO: 46.9 % (ref 35–47)
HGB BLD-MCNC: 14.8 G/DL (ref 11.5–16)
IMM GRANULOCYTES # BLD AUTO: 0.03 K/UL (ref 0–0.04)
IMM GRANULOCYTES NFR BLD AUTO: 0.4 % (ref 0–0.5)
LACTATE BLD-SCNC: 2.57 MMOL/L (ref 0.4–2)
LYMPHOCYTES # BLD: 1.15 K/UL (ref 0.8–3.5)
LYMPHOCYTES NFR BLD: 15 % (ref 12–49)
MAGNESIUM SERPL-MCNC: 2.6 MG/DL (ref 1.6–2.4)
MCH RBC QN AUTO: 29.5 PG (ref 26–34)
MCHC RBC AUTO-ENTMCNC: 31.6 G/DL (ref 30–36.5)
MCV RBC AUTO: 93.4 FL (ref 80–99)
MONOCYTES # BLD: 0.67 K/UL (ref 0–1)
MONOCYTES NFR BLD: 8.8 % (ref 5–13)
NEUTS SEG # BLD: 5.79 K/UL (ref 1.8–8)
NEUTS SEG NFR BLD: 75.7 % (ref 32–75)
NRBC # BLD: 0.04 K/UL (ref 0–0.01)
NRBC BLD-RTO: 0.5 PER 100 WBC
PERFORMED BY:: ABNORMAL
PERFORMED BY:: ABNORMAL
PHOSPHATE SERPL-MCNC: 3.6 MG/DL (ref 2.6–4.7)
PLATELET # BLD AUTO: 175 K/UL (ref 150–400)
PMV BLD AUTO: 11.1 FL (ref 8.9–12.9)
POTASSIUM SERPL-SCNC: 4.1 MMOL/L (ref 3.5–5.1)
PROCALCITONIN SERPL-MCNC: 0.35 NG/ML
PROT SERPL-MCNC: 7 G/DL (ref 6.4–8.2)
RBC # BLD AUTO: 5.02 M/UL (ref 3.8–5.2)
SARS-COV-2 RNA RESP QL NAA+PROBE: NOT DETECTED
SODIUM SERPL-SCNC: 142 MMOL/L (ref 136–145)
TROPONIN I SERPL HS-MCNC: 64 NG/L (ref 0–51)
WBC # BLD AUTO: 7.7 K/UL (ref 3.6–11)

## 2025-04-05 PROCEDURE — 87040 BLOOD CULTURE FOR BACTERIA: CPT

## 2025-04-05 PROCEDURE — 96365 THER/PROPH/DIAG IV INF INIT: CPT

## 2025-04-05 PROCEDURE — 80053 COMPREHEN METABOLIC PANEL: CPT

## 2025-04-05 PROCEDURE — 84100 ASSAY OF PHOSPHORUS: CPT

## 2025-04-05 PROCEDURE — 85025 COMPLETE CBC W/AUTO DIFF WBC: CPT

## 2025-04-05 PROCEDURE — 87636 SARSCOV2 & INF A&B AMP PRB: CPT

## 2025-04-05 PROCEDURE — 93005 ELECTROCARDIOGRAM TRACING: CPT | Performed by: EMERGENCY MEDICINE

## 2025-04-05 PROCEDURE — 82962 GLUCOSE BLOOD TEST: CPT

## 2025-04-05 PROCEDURE — 84484 ASSAY OF TROPONIN QUANT: CPT

## 2025-04-05 PROCEDURE — 99285 EMERGENCY DEPT VISIT HI MDM: CPT

## 2025-04-05 PROCEDURE — 83735 ASSAY OF MAGNESIUM: CPT

## 2025-04-05 PROCEDURE — 83880 ASSAY OF NATRIURETIC PEPTIDE: CPT

## 2025-04-05 PROCEDURE — 36415 COLL VENOUS BLD VENIPUNCTURE: CPT

## 2025-04-05 PROCEDURE — 71045 X-RAY EXAM CHEST 1 VIEW: CPT

## 2025-04-05 PROCEDURE — 2580000003 HC RX 258: Performed by: EMERGENCY MEDICINE

## 2025-04-05 PROCEDURE — 84145 PROCALCITONIN (PCT): CPT

## 2025-04-05 PROCEDURE — 1100000000 HC RM PRIVATE

## 2025-04-05 PROCEDURE — 83605 ASSAY OF LACTIC ACID: CPT

## 2025-04-05 PROCEDURE — 6360000002 HC RX W HCPCS: Performed by: EMERGENCY MEDICINE

## 2025-04-05 RX ORDER — 0.9 % SODIUM CHLORIDE 0.9 %
30 INTRAVENOUS SOLUTION INTRAVENOUS ONCE
Status: DISCONTINUED | OUTPATIENT
Start: 2025-04-05 | End: 2025-04-05

## 2025-04-05 RX ORDER — 0.9 % SODIUM CHLORIDE 0.9 %
500 INTRAVENOUS SOLUTION INTRAVENOUS ONCE
Status: COMPLETED | OUTPATIENT
Start: 2025-04-05 | End: 2025-04-06

## 2025-04-05 RX ADMIN — PIPERACILLIN AND TAZOBACTAM 4500 MG: 4; .5 INJECTION, POWDER, FOR SOLUTION INTRAVENOUS at 22:45

## 2025-04-05 RX ADMIN — SODIUM CHLORIDE 500 ML: 9 INJECTION, SOLUTION INTRAVENOUS at 22:42

## 2025-04-05 ASSESSMENT — HEART SCORE: ECG: NORMAL

## 2025-04-05 ASSESSMENT — PAIN - FUNCTIONAL ASSESSMENT: PAIN_FUNCTIONAL_ASSESSMENT: NONE - DENIES PAIN

## 2025-04-06 VITALS
RESPIRATION RATE: 26 BRPM | TEMPERATURE: 93.6 F | SYSTOLIC BLOOD PRESSURE: 78 MMHG | HEIGHT: 62 IN | WEIGHT: 135.8 LBS | BODY MASS INDEX: 24.99 KG/M2 | HEART RATE: 74 BPM | OXYGEN SATURATION: 95 % | DIASTOLIC BLOOD PRESSURE: 55 MMHG

## 2025-04-06 LAB
APPEARANCE UR: ABNORMAL
BACTERIA URNS QL MICRO: NEGATIVE /HPF
BILIRUB UR QL: NEGATIVE
COLOR UR: ABNORMAL
EKG ATRIAL RATE: 113 BPM
EKG ATRIAL RATE: 274 BPM
EKG DIAGNOSIS: NORMAL
EKG DIAGNOSIS: NORMAL
EKG Q-T INTERVAL: 312 MS
EKG Q-T INTERVAL: 406 MS
EKG QRS DURATION: 68 MS
EKG QRS DURATION: 76 MS
EKG QTC CALCULATION (BAZETT): 471 MS
EKG QTC CALCULATION (BAZETT): 493 MS
EKG R AXIS: -4 DEGREES
EKG R AXIS: 9 DEGREES
EKG T AXIS: -48 DEGREES
EKG T AXIS: -59 DEGREES
EKG VENTRICULAR RATE: 137 BPM
EKG VENTRICULAR RATE: 89 BPM
EPITH CASTS URNS QL MICRO: ABNORMAL /LPF
GLUCOSE BLD STRIP.AUTO-MCNC: 133 MG/DL (ref 65–100)
GLUCOSE UR STRIP.AUTO-MCNC: NEGATIVE MG/DL
HGB UR QL STRIP: ABNORMAL
KETONES UR QL STRIP.AUTO: NEGATIVE MG/DL
LACTATE BLD-SCNC: 2.29 MMOL/L (ref 0.4–2)
LEUKOCYTE ESTERASE UR QL STRIP.AUTO: ABNORMAL
NITRITE UR QL STRIP.AUTO: NEGATIVE
PERFORMED BY:: ABNORMAL
PERFORMED BY:: ABNORMAL
PH UR STRIP: 5 (ref 5–8)
PROT UR STRIP-MCNC: >300 MG/DL
RBC #/AREA URNS HPF: ABNORMAL /HPF (ref 0–5)
SP GR UR REFRACTOMETRY: 1.02 (ref 1–1.03)
TROPONIN I SERPL HS-MCNC: 52 NG/L (ref 0–51)
URINE CULTURE IF INDICATED: ABNORMAL
UROBILINOGEN UR QL STRIP.AUTO: 2 EU/DL (ref 0.1–1)
WBC URNS QL MICRO: ABNORMAL /HPF (ref 0–4)

## 2025-04-06 PROCEDURE — 87086 URINE CULTURE/COLONY COUNT: CPT

## 2025-04-06 PROCEDURE — 6360000002 HC RX W HCPCS: Performed by: EMERGENCY MEDICINE

## 2025-04-06 PROCEDURE — 81001 URINALYSIS AUTO W/SCOPE: CPT

## 2025-04-06 PROCEDURE — 84484 ASSAY OF TROPONIN QUANT: CPT

## 2025-04-06 PROCEDURE — 93005 ELECTROCARDIOGRAM TRACING: CPT

## 2025-04-06 PROCEDURE — 82962 GLUCOSE BLOOD TEST: CPT

## 2025-04-06 PROCEDURE — 2580000003 HC RX 258: Performed by: EMERGENCY MEDICINE

## 2025-04-06 PROCEDURE — 83605 ASSAY OF LACTIC ACID: CPT

## 2025-04-06 PROCEDURE — 6360000002 HC RX W HCPCS

## 2025-04-06 RX ORDER — IPRATROPIUM BROMIDE AND ALBUTEROL SULFATE 2.5; .5 MG/3ML; MG/3ML
1 SOLUTION RESPIRATORY (INHALATION)
Status: DISCONTINUED | OUTPATIENT
Start: 2025-04-06 | End: 2025-04-06 | Stop reason: HOSPADM

## 2025-04-06 RX ORDER — POLYETHYLENE GLYCOL 3350 17 G/17G
17 POWDER, FOR SOLUTION ORAL DAILY PRN
Status: DISCONTINUED | OUTPATIENT
Start: 2025-04-06 | End: 2025-04-06 | Stop reason: HOSPADM

## 2025-04-06 RX ORDER — SODIUM CHLORIDE 9 MG/ML
INJECTION, SOLUTION INTRAVENOUS PRN
Status: DISCONTINUED | OUTPATIENT
Start: 2025-04-06 | End: 2025-04-06 | Stop reason: HOSPADM

## 2025-04-06 RX ORDER — FUROSEMIDE 10 MG/ML
20 INJECTION INTRAMUSCULAR; INTRAVENOUS ONCE
Status: COMPLETED | OUTPATIENT
Start: 2025-04-06 | End: 2025-04-06

## 2025-04-06 RX ORDER — ONDANSETRON 4 MG/1
4 TABLET, ORALLY DISINTEGRATING ORAL EVERY 8 HOURS PRN
Status: DISCONTINUED | OUTPATIENT
Start: 2025-04-06 | End: 2025-04-06 | Stop reason: HOSPADM

## 2025-04-06 RX ORDER — HEPARIN SODIUM 5000 [USP'U]/ML
5000 INJECTION, SOLUTION INTRAVENOUS; SUBCUTANEOUS EVERY 8 HOURS SCHEDULED
Status: DISCONTINUED | OUTPATIENT
Start: 2025-04-06 | End: 2025-04-06

## 2025-04-06 RX ORDER — BUDESONIDE AND FORMOTEROL FUMARATE DIHYDRATE 160; 4.5 UG/1; UG/1
2 AEROSOL RESPIRATORY (INHALATION)
Status: DISCONTINUED | OUTPATIENT
Start: 2025-04-06 | End: 2025-04-06 | Stop reason: HOSPADM

## 2025-04-06 RX ORDER — LEVOTHYROXINE SODIUM 75 UG/1
75 TABLET ORAL
Status: DISCONTINUED | OUTPATIENT
Start: 2025-04-06 | End: 2025-04-06 | Stop reason: HOSPADM

## 2025-04-06 RX ORDER — MORPHINE SULFATE 20 MG/ML
10 SOLUTION ORAL
Refills: 0 | Status: DISCONTINUED | OUTPATIENT
Start: 2025-04-06 | End: 2025-04-06 | Stop reason: HOSPADM

## 2025-04-06 RX ORDER — SODIUM CHLORIDE 0.9 % (FLUSH) 0.9 %
5-40 SYRINGE (ML) INJECTION EVERY 12 HOURS SCHEDULED
Status: DISCONTINUED | OUTPATIENT
Start: 2025-04-06 | End: 2025-04-06 | Stop reason: HOSPADM

## 2025-04-06 RX ORDER — METHYLPREDNISOLONE SODIUM SUCCINATE 40 MG/ML
40 INJECTION INTRAMUSCULAR; INTRAVENOUS EVERY 12 HOURS
Status: DISCONTINUED | OUTPATIENT
Start: 2025-04-06 | End: 2025-04-06 | Stop reason: HOSPADM

## 2025-04-06 RX ORDER — ONDANSETRON 2 MG/ML
4 INJECTION INTRAMUSCULAR; INTRAVENOUS EVERY 6 HOURS PRN
Status: DISCONTINUED | OUTPATIENT
Start: 2025-04-06 | End: 2025-04-06 | Stop reason: HOSPADM

## 2025-04-06 RX ORDER — ACETAMINOPHEN 325 MG/1
650 TABLET ORAL EVERY 6 HOURS PRN
Status: DISCONTINUED | OUTPATIENT
Start: 2025-04-06 | End: 2025-04-06 | Stop reason: HOSPADM

## 2025-04-06 RX ORDER — SODIUM CHLORIDE 0.9 % (FLUSH) 0.9 %
5-40 SYRINGE (ML) INJECTION PRN
Status: DISCONTINUED | OUTPATIENT
Start: 2025-04-06 | End: 2025-04-06 | Stop reason: HOSPADM

## 2025-04-06 RX ORDER — ACETAMINOPHEN 650 MG/1
650 SUPPOSITORY RECTAL EVERY 6 HOURS PRN
Status: DISCONTINUED | OUTPATIENT
Start: 2025-04-06 | End: 2025-04-06 | Stop reason: HOSPADM

## 2025-04-06 RX ORDER — METHYLPREDNISOLONE SODIUM SUCCINATE 40 MG/ML
40 INJECTION INTRAMUSCULAR; INTRAVENOUS ONCE
Status: COMPLETED | OUTPATIENT
Start: 2025-04-06 | End: 2025-04-06

## 2025-04-06 RX ADMIN — FUROSEMIDE 20 MG: 10 INJECTION, SOLUTION INTRAMUSCULAR; INTRAVENOUS at 00:35

## 2025-04-06 RX ADMIN — AZITHROMYCIN MONOHYDRATE 500 MG: 500 INJECTION, POWDER, LYOPHILIZED, FOR SOLUTION INTRAVENOUS at 00:15

## 2025-04-06 RX ADMIN — METHYLPREDNISOLONE SODIUM SUCCINATE 40 MG: 40 INJECTION INTRAMUSCULAR; INTRAVENOUS at 00:34

## 2025-04-06 NOTE — ED TRIAGE NOTES
Pt arrived by ems complain of general weakness x4day. Pt started new medication. A&Ox1 this is pt baseline hx of dementia

## 2025-04-06 NOTE — PROGRESS NOTES
Received Order for Telemetry     Kimber Morris   1945   [id number]   Typical atrial flutter (HCC) [I48.3]  Sepsis (HCC) [A41.9]  Severe sepsis [A41.9, R65.20]  Pneumonia due to infectious organism, unspecified laterality, unspecified part of lung [J18.9]  Acute renal failure superimposed on chronic kidney disease, unspecified acute renal failure type, unspecified CKD stage [N17.9, N18.9]   Vicky Mendez MD     Tele Box # [id number] placed on patient at  0051 am  ER Room # 25  Admitting to Room 206  Transferring Nurse Mark  Verified with Primary Nurse Ruthie at  0135 am

## 2025-04-06 NOTE — PROGRESS NOTES
Spiritual Health History and Assessment/Progress Note  [facility]    Grief, Loss, and Adjustments,  , Death, Bereavement Care,      Name: Kimber Morris MRN: [id number]    Age: 79 y.o.     Sex: female   Language: English   Christianity: Religious   Sepsis (HCC)     Date: 4/6/2025            Total Time Calculated: 60 min              Spiritual Assessment began in [facility]        Referral/Consult From: Nurse   Encounter Overview/Reason: Grief, Loss, and Adjustments  Service Provided For: Family    Marlen, Belief, Meaning:   Patient unable to assess at this time  Family/Friends identify as spiritual, are connected with a marlen tradition or spiritual practice, and have beliefs or practices that help with coping during difficult times      Importance and Influence:  Patient unable to assess at this time  Family/Friends have spiritual/personal beliefs that influence decisions regarding the patient's health    Community:  Patient Other: unable to assess from patient perspective, but strong family support evident  Family/Friends feel well-supported. Support system includes: Extended family    Assessment and Plan of Care:     Patient Interventions include: Other: prayer  Family/Friends Interventions include: Facilitated expression of thoughts and feelings, Explored spiritual coping/struggle/distress, Engaged in theological reflection, Affirmed coping skills/support systems, Facilitated life review and/or legacy, and Other: prayer, active listening, ministry of presence    Patient Plan of Care: No spiritual needs identified for follow-up  Family/Friends Plan of Care: No spiritual needs identified for follow-up     responded to patient death. Provided support to patient's daughter and another family member. Prayer, active listening, ministry of presence. Family expressed appreciation for  visit.    Electronically signed by Ishan Ogden, Chaplain Resident on 4/6/2025 at 2:48 AM

## 2025-04-06 NOTE — H&P
Hospitalist Admission Note    NAME: Kimber Morris   :  1945   MRN:  [id number]     Date/Time:  2025 11:58 PM    Patient PCP: Jennifer Horn, ITZEL    ______________________________________________________________________  Given the patient's current clinical presentation, I have a high level of concern for decompensation if discharged from the emergency department.  Complex decision making was performed, which includes reviewing the patient's available past medical records, laboratory results, and x-ray films.       My assessment of this patient's clinical condition and my plan of care is as follows.    Assessment / Plan:    SIRS, possible sepsis secondary to CAP/UTI versus CHF  -Tachycardic, tachypneic, no leukocytosis  -COVID flu negative  -Chest x-ray shows right-sided airspace disease suspicious for pneumonia, and pleural effusion  -UA shows UTI with some pyuria  -Patient got Zosyn and azithromycin, will continue  -Will continue gentle hydration given low EF    Heart failure with reduced EF 10 to 20%, in exacerbation  -Chest x-ray concerning for PNA, edema and proBNP elevated  -complaining of SOB, lasix given  -Will hold off sepsis fluid bolus  -Continue gentle hydration    Hypertension  -Continue home meds if blood pressure is allowed    A-fib on Xarelto  -Rate controlled, not rhythm   -Will hold off metoprolol until sepsis improves    COPD, not in exacerbation  -However will cover with steroids given tachypnea  -DuoNeb, Symbicort    Dementia  -Continue home meds    Daughter is the POA, requests patient to be made DNR/DNI as those are patient's wishes.  Patient has been made DNR/DNI.      Code Status: DNR/DNI   DVT Prophylaxis: Lovenox   GI Prophylaxis: not indicated     Medical Decision Making     [x] High (any 2)     A. Problems (any 1)  [x] Acute/Chronic Illness/injury posing threat to life or bodily function:    [] Severe exacerbation of chronic illness:   of the chest demonstrates cardiomegaly. Right basilar airspace  disease. No pulmonary edema or pneumothorax. Trace right pleural effusion.  Degenerative changes in the spine. Postoperative changes right shoulder.    Impression  Right basilar airspace disease and small right pleural effusion. Cardiomegaly.    Electronically signed by Surendar Grigsby        _______________________________________________________________________    TOTAL TIME:  70 Minutes    Critical Care Provided     Minutes non procedure based    Signed: Vicky Mendez MD    Procedures: see electronic medical records for all procedures/Xrays and details which were not copied into this note but were reviewed prior to creation of Plan.

## 2025-04-06 NOTE — ED PROVIDER NOTES
fluids.  Chilo Faust D.O.    DISPOSITION/PLAN   Social Determinants affecting Treatment Plan: None    DISPOSITION Decision To Admit 04/05/2025 11:46:27 PM   DISPOSITION CONDITION Stable           Admitted to hospitalist.      PATIENT REFERRED TO:  No follow-up provider specified.    DISCHARGE MEDICATIONS:     Medication List        ASK your doctor about these medications      clopidogrel 75 MG tablet  Commonly known as: PLAVIX     donepezil 5 MG tablet  Commonly known as: ARICEPT     Eliquis 2.5 MG Tabs tablet  Generic drug: apixaban     levothyroxine 75 MCG tablet  Commonly known as: SYNTHROID     metoprolol succinate 25 MG extended release tablet  Commonly known as: TOPROL XL  Take 1 tablet by mouth daily              DISCONTINUED MEDICATIONS:  Current Discharge Medication List          ED FINAL IMPRESSION     1. Severe sepsis    2. Acute renal failure superimposed on chronic kidney disease, unspecified acute renal failure type, unspecified CKD stage    3. Pneumonia due to infectious organism, unspecified laterality, unspecified part of lung    4. Typical atrial flutter (HCC)         I am the primary provider of record. Chilo Faust DO (electronically signed)    (Please note that parts of this dictation were completed with voice recognition software. Quite often unanticipated grammatical, syntax, homophones, and other interpretive errors are inadvertently transcribed by the computer software. Please disregards these errors. Please excuse any errors that have escaped final proofreading.)       Chilo Faust,   04/05/25 3025

## 2025-04-06 NOTE — PROGRESS NOTES
Patient arrived to room 206 from ED at 0105.  Daughter at bedside.  Blood sugar obtain, Difficulty obtaining vitals signs, Tachypnic, A Fib noticed on telemetry box 104. Unable to obtain O2 sats, placed on 2 LNC,   Manual blood pressure of 70/55 obtained.  of Pt hypothermic - temp 93.6 axially.  Daughter approached with code status.  Informed SARAH Cisneros that she would like her mother to be a DNR.  Order placed in chart.    Dr Mendez arrived to room 206.  Made patient comfort measures.  Respirations rapidly decreased.  No chest rise noted.  No audible heart beat.  Dr MIKE Mendez called time of death at 0200 on 4-6-25.    IV from right AC and left wrist removed.  Oxygen and telemetry removed.  Clear gem ring and silver toned watch given to daughter, Aurora Mckayttendon.  All other personal belongings also given to daughter.    [employer] notified.  Chaplain Blane arrived to room.  Spending time with daughter.    0245 - Family members left.    0320 - Pt taken to [facility] by LAURA Pal and KEAGAN Rivera.

## 2025-04-06 NOTE — DISCHARGE SUMMARY
.                                       Discharge Summary    Name: Kimber Morris  [id number]  YOB: 1945 (Age: 79 y.o.)   Date of Admission: 4/5/2025  Date of Discharge: 4/6/2025  Attending Physician: Vicky Mendez MD    Discharge Diagnosis:   Principal Problem:    Sepsis (HCC)  Resolved Problems:    * No resolved hospital problems. *       Consultations:  None      Brief Admission History/Reason for Admission Per Vicky Mendez MD:   Kimber Morris is a 79 y.o.  female with PMHx significant for COPD, heart failure reduced EF 10 to 20%, A-fib on Xarelto, CKD, dementia, diabetes, GERD who was brought in by her daughter due to several weeks of decreased oral intake and generalized weakness.  Recently she was started on SSRI, no other history was obtained.     During my evaluation, daughter is by bedside.  Daughter states patient is having decreased oral intake has been having some subjective fever, and generalized weakness.  As the weakness got worse that is what prompted her to come to the ED.  Patient herself has been having some shortness of breath, denies any pain, fever, nausea, vomiting, abdominal pain.     In the ED, patient was noted to be hypertensive tachycardic, tachypneic CBC unremarkable CMP shows creatinine of 2.45, baseline is 1.7, Pro-Noble 0.35, Pro-Noble was 2.57 improved to 2.29, troponin 64-50 52, UA shows mild pyuria of 10-20 WBC.  Chest x-ray shows airspace disease or effusion cardiomegaly.  Patient was given Zosyn and azithromycin.  And was given 500 cc bolus due to suspicion for CHF exacerbation.  Medicine service requested for possible sepsis as secondary to UTI/pneumonia and CHF exacerbation.       Brief Hospital Course by Main Problems:     Kimber Morris is a 79 y.o.  female with PMHx significant for COPD, heart failure reduced EF 10 to 20%, A-fib on Xarelto, CKD, dementia, diabetes, GERD who was brought in by her daughter due to several weeks of decreased oral intake and generalized  Labs     04/05/25  2222   HGB 14.8   HCT 46.9   WBC 7.7          Discharge Medications:     Medication List        ASK your doctor about these medications      clopidogrel 75 MG tablet  Commonly known as: PLAVIX     donepezil 5 MG tablet  Commonly known as: ARICEPT     Eliquis 2.5 MG Tabs tablet  Generic drug: apixaban     levothyroxine 75 MCG tablet  Commonly known as: SYNTHROID     metoprolol succinate 25 MG extended release tablet  Commonly known as: TOPROL XL  Take 1 tablet by mouth daily                    Follow up with:   PCP : Jennifer Horn FNP  No follow-up provider specified.        Total time in minutes spent coordinating this discharge (includes going over instructions, follow-up, prescriptions, and preparing report for sign off to her PCP) :  35 minutes

## 2025-04-06 NOTE — ED NOTES
ED TO INPATIENT SBAR HANDOFF    Patient Name: Kimber Morris   Preferred Name: Kimber  : 1945  79 y.o.   Family/Caregiver Present: no   Code Status Order: Prior  PO Status: NPO:No  Telemetry Order:   C-SSRS:    Sitter no   Restraints:     Sepsis Risk Score      Situation  Chief Complaint   Patient presents with    Fatigue     Brief Description of Patient's Condition: Pt arrived by ems complain of general weakness x4day. Pt started new medication. A&Ox1 this is pt baseline hx of dementia    Mental Status: disoriented  Arrived from:Home  Imaging:   XR CHEST PORTABLE   Final Result   Right basilar airspace disease and small right pleural effusion. Cardiomegaly.      Electronically signed by Surendra Grigsby        Abnormal labs:   Abnormal Labs Reviewed   CBC WITH AUTO DIFFERENTIAL - Abnormal; Notable for the following components:       Result Value    RDW 15.1 (*)     Nucleated RBCs 0.5 (*)     nRBC 0.04 (*)     Neutrophils % 75.7 (*)     All other components within normal limits   COMPREHENSIVE METABOLIC PANEL - Abnormal; Notable for the following components:    Chloride 111 (*)     Glucose 166 (*)     BUN 42 (*)     Creatinine 2.45 (*)     Est, Glom Filt Rate 19 (*)     Total Bilirubin 1.4 (*)     AST 52 (*)     ALT 79 (*)     Alk Phosphatase 126 (*)     Albumin 2.9 (*)     Globulin 4.1 (*)     Albumin/Globulin Ratio 0.7 (*)     All other components within normal limits   POC LACTIC ACID - Abnormal; Notable for the following components:    POC Lactic Acid 2.57 (*)     All other components within normal limits   PROCALCITONIN - Abnormal; Notable for the following components:    Procalcitonin 0.35 (*)     All other components within normal limits   BRAIN NATRIURETIC PEPTIDE - Abnormal; Notable for the following components:    NT Pro-BNP 14,555 (*)     All other components within normal limits   MAGNESIUM - Abnormal; Notable for the following components:    Magnesium 2.6 (*)     All other components within  Assessment 0 04/05/25 2232   Dressing Status Clean, dry & intact 04/05/25 2232   Dressing Type Transparent 04/05/25 2232   Dressing Intervention New 04/05/25 2232     Active Central Lines:                          Active Wounds:    Active Bianchi's:    Active Feeding Tubes:      Administered Medications:   Medications   sodium chloride 0.9 % bolus 500 mL (500 mLs IntraVENous New Bag 4/5/25 2242)   azithromycin (ZITHROMAX) 500 mg in sodium chloride 0.9 % 250 mL IVPB ([id number]) (500 mg IntraVENous New Bag 4/6/25 0015)   piperacillin-tazobactam (ZOSYN) 4,500 mg in sodium chloride 0.9 % 100 mL IVPB (addEASE) (0 mg IntraVENous Stopped 4/5/25 2330)   furosemide (LASIX) injection 20 mg (20 mg IntraVENous Given 4/6/25 0035)   methylPREDNISolone sodium succ (SOLU-MEDROL) injection 40 mg (40 mg IntraVENous Given 4/6/25 0034)     Last documented pain medication administration: n/a  Pertinent or High Risk Medications/Drips: no   If Yes, please provide details: n/a  Blood Product Administration: no  If Yes, please provide details: n/a  Process Protocols/Bundles: Sepsis Protocol/Bundle Completion-still running next lactic due at 0213    Recommendation  Incomplete STAT orders: n/a  Overdue Medications: n/a  Patient Belongings:    Additional Comments: n/a  If any further questions, please call Sending RN at [phone]      Admitting Unit Notification  Name of person notified and time: arlyn      Electronically signed by: Electronically signed by Niki Aldrich RN on 4/6/2025 at 12:41 AM

## 2025-04-07 LAB
BACTERIA SPEC CULT: NORMAL
Lab: NORMAL

## 2025-04-11 LAB
BACTERIA SPEC CULT: NORMAL
BACTERIA SPEC CULT: NORMAL
Lab: NORMAL
Lab: NORMAL

## 2025-04-15 NOTE — PROGRESS NOTES
Physician Progress Note      PATIENT:               ALAN MARTINEZ  CSN #:                  [id number]  :                       1945  ADMIT DATE:       2025 10:11 PM  DISCH DATE:        2025 3:20 AM  RESPONDING  PROVIDER #:        Vicky Mendez MD          QUERY TEXT:    Based on your medical judgment, please clarify these findings and document if   any of the following are being evaluated and/or treated:    The clinical indicators include:  79 y.o.  female with PMHx significant for COPD, heart failure reduced EF 10 to   20%, A-fib on Xarelto, CKD, dementia, diabetes, GERD who was brought in by   her daughter due to several weeks of decreased oral intake and generalized   weakness admitted for sepsis likely secondary to pneumonia/UTI and possible   underlying CHF exacerbation driven by sepsis.    Per H&P - A-fib on Xarelto, Rate controlled, not rhythm, Will hold off   metoprolol until sepsis improves  Options provided:  -- Secondary hypercoagulable state in a patient with atrial fibrillation  -- Other - I will add my own diagnosis  -- Disagree - Not applicable / Not valid  -- Disagree - Clinically unable to determine / Unknown  -- Refer to Clinical Documentation Reviewer    PROVIDER RESPONSE TEXT:    Provider is clinically unable to determine a response to this query.  unable to determine    Query created by: Linda Power on 2025 12:56 PM      Electronically signed by:  Vicky Mendez MD 4/15/2025 7:11 AM

## 2025-04-15 NOTE — PROGRESS NOTES
Physician Progress Note      PATIENT:               ALAN MARTINEZ  CSN #:                  [id number]  :                       1945  ADMIT DATE:       2025 10:11 PM  DISCH DATE:        2025 3:20 AM  RESPONDING  PROVIDER #:        Vicky Mendez MD          QUERY TEXT:    Troponin level of 64 and 52 is documented in the medical record. Please   specify the significance:    The clinical indicators include:  79 y.o.  female with PMHx significant for COPD, heart failure reduced EF 10 to   20%, A-fib on Xarelto, CKD, dementia, diabetes, GERD who was brought in by   her daughter due to several weeks of decreased oral intake and generalized   weakness.    Per ED provider: EKG: Initial EKG interpreted by me shows: Atrial flutter at   rate of 137 bpm.    Per DC summary: In the ED, patient was noted to be hypertensive tachycardic,   tachypneic CBC unremarkable CMP shows creatinine of 2.45, baseline is 1.7,   Pro-Noble 0.35, Pro-Noble was 2.57 improved to 2.29, troponin 64-50 52, UA shows   mild pyuria of 10-20 WBC.  Chest x-ray shows airspace disease or effusion   cardiomegaly.  Patient was given Zosyn and azithromycin.  And was given 500 cc   bolus due to suspicion for CHF exacerbation.  Options provided:  -- Demand ischemia related to CHF  -- Other - I will add my own diagnosis  -- Disagree - Not applicable / Not valid  -- Disagree - Clinically unable to determine / Unknown  -- Refer to Clinical Documentation Reviewer    PROVIDER RESPONSE TEXT:    The patient has demand ischemia related to CHF.    Query created by: Linda Power on 2025 1:11 PM      Electronically signed by:  Vicky Mendez MD 4/15/2025 5:26 PM

## (undated) DEVICE — GUIDEWIRE VASC L260CM DIA0.035IN TIP L3MM STD EXCHG PTFE J

## (undated) DEVICE — GLIDESHEATH SLENDER ACCESS KIT: Brand: GLIDESHEATH SLENDER

## (undated) DEVICE — SYRINGE MED 10ML PUR GAM COMPATIBLE POLYCARB FIX M LUER CONN

## (undated) DEVICE — TUBING ANGIO L48IN POLYUR HI PRSS 1200PSI AIRLESS ROT ADPT

## (undated) DEVICE — TR BAND RADIAL ARTERY COMPRESSION DEVICE: Brand: TR BAND

## (undated) DEVICE — GLIDESHEATH SLENDER NITINOL HYDROPHILIC COATED INTRODUCER SHEATH: Brand: GLIDESHEATH SLENDER

## (undated) DEVICE — 3M™ TEGADERM™ TRANSPARENT FILM DRESSING FRAME STYLE, 1626W, 4 IN X 4-3/4 IN (10 CM X 12 CM), 50/CT 4CT/CASE: Brand: 3M™ TEGADERM™

## (undated) DEVICE — SURGICAL PROCEDURE TRAY CRD CATH 3 PRT

## (undated) DEVICE — DRAPE,ANGIO,BRACH,STERILE,38X44: Brand: MEDLINE

## (undated) DEVICE — SYRINGE MED 10ML RED POLYCARB BRL FIX M LUER CONN FLAT GRP

## (undated) DEVICE — CATHETER ANGIO JR4 PIG STD AD 4 FRX110 CM MP QUIK CARE INFIN

## (undated) DEVICE — WASTEBAG DRIP/ADAPTER: Brand: MEDLINE INDUSTRIES, INC.